# Patient Record
Sex: FEMALE | Race: BLACK OR AFRICAN AMERICAN | NOT HISPANIC OR LATINO | Employment: OTHER | URBAN - METROPOLITAN AREA
[De-identification: names, ages, dates, MRNs, and addresses within clinical notes are randomized per-mention and may not be internally consistent; named-entity substitution may affect disease eponyms.]

---

## 2017-08-06 ENCOUNTER — HOSPITAL ENCOUNTER (EMERGENCY)
Facility: OTHER | Age: 47
Discharge: HOME | End: 2017-08-06
Attending: EMERGENCY MEDICINE
Payer: MEDICAID

## 2017-08-06 VITALS
SYSTOLIC BLOOD PRESSURE: 120 MMHG | RESPIRATION RATE: 17 BRPM | TEMPERATURE: 99 F | BODY MASS INDEX: 32.47 KG/M2 | HEIGHT: 66 IN | DIASTOLIC BLOOD PRESSURE: 67 MMHG | HEART RATE: 95 BPM | WEIGHT: 202 LBS | OXYGEN SATURATION: 100 %

## 2017-08-06 DIAGNOSIS — K05.219 PERIODONTAL ABSCESS: Primary | ICD-10-CM

## 2017-08-06 PROCEDURE — 40800 DRAINAGE OF MOUTH LESION: CPT

## 2017-08-06 PROCEDURE — 99283 EMERGENCY DEPT VISIT LOW MDM: CPT | Mod: 25

## 2017-08-06 PROCEDURE — 25000003 PHARM REV CODE 250: Performed by: EMERGENCY MEDICINE

## 2017-08-06 RX ORDER — PENICILLIN V POTASSIUM 500 MG/1
500 TABLET, FILM COATED ORAL 4 TIMES DAILY
Qty: 28 TABLET | Refills: 0 | Status: SHIPPED | OUTPATIENT
Start: 2017-08-06 | End: 2017-08-13

## 2017-08-06 RX ORDER — PENICILLIN V POTASSIUM 500 MG/1
500 TABLET, FILM COATED ORAL
Status: COMPLETED | OUTPATIENT
Start: 2017-08-06 | End: 2017-08-06

## 2017-08-06 RX ORDER — METFORMIN HYDROCHLORIDE EXTENDED-RELEASE TABLETS 1000 MG/1
1000 TABLET, FILM COATED, EXTENDED RELEASE ORAL 2 TIMES DAILY WITH MEALS
Status: ON HOLD | COMMUNITY
End: 2020-02-27

## 2017-08-06 RX ORDER — HYDROCODONE BITARTRATE AND ACETAMINOPHEN 5; 325 MG/1; MG/1
1 TABLET ORAL EVERY 4 HOURS PRN
Qty: 8 TABLET | Refills: 0 | Status: SHIPPED | OUTPATIENT
Start: 2017-08-06 | End: 2017-08-16

## 2017-08-06 RX ORDER — LIDOCAINE HYDROCHLORIDE 10 MG/ML
10 INJECTION INFILTRATION; PERINEURAL
Status: COMPLETED | OUTPATIENT
Start: 2017-08-06 | End: 2017-08-06

## 2017-08-06 RX ORDER — WARFARIN 1 MG/1
TABLET ORAL DAILY
Status: ON HOLD | COMMUNITY
End: 2020-02-29 | Stop reason: HOSPADM

## 2017-08-06 RX ADMIN — PENICILLIN V POTASIUM 500 MG: 500 TABLET OROPHARYNGEAL at 11:08

## 2017-08-06 RX ADMIN — LIDOCAINE HYDROCHLORIDE 10 ML: 10 INJECTION, SOLUTION INFILTRATION; PERINEURAL at 10:08

## 2017-08-06 NOTE — ED PROVIDER NOTES
"Encounter Date: 8/6/2017    SCRIBE #1 NOTE: I, Jesenia Mosquera, am scribing for, and in the presence of,  Dr. Loza. I have scribed the entire note.       History     Chief Complaint   Patient presents with    Dental Pain     x 2 weeks after tooth broke    Facial Swelling     since this am to the left side of the face and below the left eye.      Time seen by provider: 10:28 AM    This is a 47 y.o. female who presents with complaint of dental pain x 2 weeks. Pt notes her tooth to the L upper mouth "anastasiya just fell apart" about 2 weeks ago. She has had onset of pain since with gradual increase of severity. She has had swelling to the L sided face for 3 days, which has also increased in severity since onset. Pt has gargled salt water and used warm compresses with no relief. She was given a referral to a dentist but has not seen one yet. She has no hx abscesses. Pt has no fever, chills, sweating, N/V/D, rhinorrhea, or weakness.      The history is provided by the patient.     Review of patient's allergies indicates:  No Known Allergies  Past Medical History:   Diagnosis Date    Arthritis     Asthma     CHF (congestive heart failure)     Diabetes mellitus     Hypertension      Past Surgical History:   Procedure Laterality Date    CARDIAC SURGERY       History reviewed. No pertinent family history.  Social History   Substance Use Topics    Smoking status: Current Every Day Smoker    Smokeless tobacco: Never Used    Alcohol use No     Review of Systems   Constitutional: Negative for chills, diaphoresis and fever.   HENT: Positive for dental problem (tooth in L upper mouth) and facial swelling (L sided ). Negative for ear pain, rhinorrhea and sore throat.    Eyes: Negative for pain and visual disturbance.   Respiratory: Negative for cough, shortness of breath and wheezing.    Cardiovascular: Negative for chest pain.   Gastrointestinal: Negative for abdominal pain, diarrhea, nausea and vomiting. "   Genitourinary: Negative for dysuria and urgency.   Musculoskeletal: Negative for back pain and neck pain.   Skin: Negative for rash.   Neurological: Negative for dizziness, weakness, light-headedness, numbness and headaches.   Hematological: Does not bruise/bleed easily.   Psychiatric/Behavioral: Negative for behavioral problems and confusion.       Physical Exam     Initial Vitals [08/06/17 1019]   BP Pulse Resp Temp SpO2   115/72 102 18 97 °F (36.1 °C) 99 %      MAP       86.33         Physical Exam    Nursing note and vitals reviewed.  Constitutional: She appears well-developed and well-nourished. She is not diaphoretic. No distress.   HENT:   Head: Normocephalic and atraumatic.   Right Ear: External ear normal.   Left Ear: External ear normal.   Mouth/Throat: Abnormal dentition.   L sided facial swelling adjacent to tooth number 13 and 14 with an area of fluctuance that is bulging and TTP in the buccal mucosa.   Eyes: Right eye exhibits no discharge. Left eye exhibits no discharge.   Neck: Normal range of motion. Neck supple.   Pulmonary/Chest: No respiratory distress.   Musculoskeletal: Normal range of motion.   Neurological: She is alert and oriented to person, place, and time.   Skin: Skin is warm and dry. No rash noted. No erythema.   Psychiatric: She has a normal mood and affect. Her behavior is normal.         ED Course   I & D - Incision and Drainage  Date/Time: 8/6/2017 11:14 AM  Performed by: CORRIE RAIN.  Authorized by: CORRIE RAIN.   Consent Done: Not Needed  Type: abscess  Body area: mouth (Adjacent to tooth number 13 and 14 in the buccal mucosa.)  Anesthesia: local infiltration    Anesthesia:  Local Anesthetic: lidocaine 1% without epinephrine  Anesthetic total: 0.5 mL  Patient sedated: no  Scalpel size: 11  Incision type: single straight  Complexity: simple  Drainage: pus  Drainage amount: copious  Wound treatment: incision,  drainage and  expression of material  Complications:  No  Specimens: No  Implants: No  Patient tolerance: Patient tolerated the procedure well with no immediate complications        Labs Reviewed - No data to display          Medical Decision Makin-year-old female with facial swelling.  Patient has a periodontal abscess.  I indeed with significant amount of pus.  We'll discharge to follow-up with dentist as an outpatient.  Discharged on penicillin.            Scribe Attestation:   Scribe #1: I performed the above scribed service and the documentation accurately describes the services I performed. I attest to the accuracy of the note.    Attending Attestation:           Physician Attestation for Scribe:  Physician Attestation Statement for Scribe #1: I, Dr. Loza, reviewed documentation, as scribed by Jesenia Mosquera in my presence, and it is both accurate and complete.                 ED Course     Clinical Impression:     1. Periodontal abscess                                 Julian Loza, DO  17 [id number]

## 2017-08-06 NOTE — ED NOTES
Patient Identifiers for Delaney Scott checked and correct  LOC: The patient is awake, alert and aware of environment with an appropriate affect, the patient is oriented x 3 and speaking appropriate.  APPEARANCE: Left sided facial & periorbital swelling.  Patient resting comfortably and in no acute distress. The patient is clean and well groomed. The patient's clothing is properly fastened.  SKIN: The skin is warm and dry. The patient has normal skin turgor and moist mucus membranes. No rashes or lesions upon observation.   Musculoskeletal :  Normal range of motion noted. Moves all extremities well.  RESPIRATORY: Airway is open and patent, respirations are spontaneous, patient has a normal effort and rate. Breath sounds are clear & equal, bilaterally.  PULSES: 2+ radial  pulses, symmetrical.  ENT: Palpation tenderness of the upper & lower back gums. Abscess of the broken left tooth.    Will continue to monitor

## 2017-08-06 NOTE — ED TRIAGE NOTES
"Pt c/o left sided facial swelling & dental pain which started 3 days ago. Pt denies recent dental work, reports that she has a "chipped tooth." Denies fever.  "

## 2018-09-13 ENCOUNTER — HOSPITAL ENCOUNTER (EMERGENCY)
Facility: OTHER | Age: 48
Discharge: HOME | End: 2018-09-13
Attending: EMERGENCY MEDICINE
Payer: MEDICAID

## 2018-09-13 VITALS
RESPIRATION RATE: 18 BRPM | DIASTOLIC BLOOD PRESSURE: 59 MMHG | TEMPERATURE: 99 F | OXYGEN SATURATION: 100 % | SYSTOLIC BLOOD PRESSURE: 113 MMHG | BODY MASS INDEX: 44.41 KG/M2 | HEART RATE: 90 BPM | HEIGHT: 68 IN | WEIGHT: 293 LBS

## 2018-09-13 DIAGNOSIS — J45.901 EXACERBATION OF ASTHMA, UNSPECIFIED ASTHMA SEVERITY, UNSPECIFIED WHETHER PERSISTENT: Primary | ICD-10-CM

## 2018-09-13 DIAGNOSIS — R06.02 SOB (SHORTNESS OF BREATH): ICD-10-CM

## 2018-09-13 LAB
ANION GAP SERPL CALC-SCNC: 11 MMOL/L
BASOPHILS # BLD AUTO: 0.02 K/UL
BASOPHILS NFR BLD: 0.3 %
BNP SERPL-MCNC: 72 PG/ML
BUN SERPL-MCNC: 13 MG/DL
CALCIUM SERPL-MCNC: 9.4 MG/DL
CHLORIDE SERPL-SCNC: 104 MMOL/L
CO2 SERPL-SCNC: 26 MMOL/L
CREAT SERPL-MCNC: 1 MG/DL
DIFFERENTIAL METHOD: ABNORMAL
EOSINOPHIL # BLD AUTO: 0.1 K/UL
EOSINOPHIL NFR BLD: 1.3 %
ERYTHROCYTE [DISTWIDTH] IN BLOOD BY AUTOMATED COUNT: 15.5 %
EST. GFR  (AFRICAN AMERICAN): >60 ML/MIN/1.73 M^2
EST. GFR  (NON AFRICAN AMERICAN): >60 ML/MIN/1.73 M^2
GLUCOSE SERPL-MCNC: 114 MG/DL
HCT VFR BLD AUTO: 35.7 %
HGB BLD-MCNC: 10.6 G/DL
INR PPP: 2
LYMPHOCYTES # BLD AUTO: 0.8 K/UL
LYMPHOCYTES NFR BLD: 10.1 %
MCH RBC QN AUTO: 27 PG
MCHC RBC AUTO-ENTMCNC: 29.7 G/DL
MCV RBC AUTO: 91 FL
MONOCYTES # BLD AUTO: 0.5 K/UL
MONOCYTES NFR BLD: 6 %
NEUTROPHILS # BLD AUTO: 6.2 K/UL
NEUTROPHILS NFR BLD: 82.2 %
PLATELET # BLD AUTO: 269 K/UL
PMV BLD AUTO: 9.6 FL
POTASSIUM SERPL-SCNC: 3.8 MMOL/L
PROTHROMBIN TIME: 21.7 SEC
RBC # BLD AUTO: 3.92 M/UL
SODIUM SERPL-SCNC: 141 MMOL/L
TROPONIN I SERPL DL<=0.01 NG/ML-MCNC: <0.006 NG/ML
WBC # BLD AUTO: 7.49 K/UL

## 2018-09-13 PROCEDURE — 94644 CONT INHLJ TX 1ST HOUR: CPT

## 2018-09-13 PROCEDURE — 99284 EMERGENCY DEPT VISIT MOD MDM: CPT | Mod: 25

## 2018-09-13 PROCEDURE — 36415 COLL VENOUS BLD VENIPUNCTURE: CPT

## 2018-09-13 PROCEDURE — 83880 ASSAY OF NATRIURETIC PEPTIDE: CPT

## 2018-09-13 PROCEDURE — 94640 AIRWAY INHALATION TREATMENT: CPT

## 2018-09-13 PROCEDURE — 25000242 PHARM REV CODE 250 ALT 637 W/ HCPCS: Performed by: EMERGENCY MEDICINE

## 2018-09-13 PROCEDURE — 85025 COMPLETE CBC W/AUTO DIFF WBC: CPT

## 2018-09-13 PROCEDURE — 80048 BASIC METABOLIC PNL TOTAL CA: CPT

## 2018-09-13 PROCEDURE — 84484 ASSAY OF TROPONIN QUANT: CPT

## 2018-09-13 PROCEDURE — 93005 ELECTROCARDIOGRAM TRACING: CPT

## 2018-09-13 PROCEDURE — 85610 PROTHROMBIN TIME: CPT

## 2018-09-13 PROCEDURE — 93010 ELECTROCARDIOGRAM REPORT: CPT | Mod: ,,, | Performed by: INTERNAL MEDICINE

## 2018-09-13 PROCEDURE — 63600175 PHARM REV CODE 636 W HCPCS: Performed by: EMERGENCY MEDICINE

## 2018-09-13 PROCEDURE — 94761 N-INVAS EAR/PLS OXIMETRY MLT: CPT

## 2018-09-13 RX ORDER — ASPIRIN 325 MG
325 TABLET ORAL
Status: DISCONTINUED | OUTPATIENT
Start: 2018-09-13 | End: 2018-09-13 | Stop reason: HOSPADM

## 2018-09-13 RX ORDER — IPRATROPIUM BROMIDE AND ALBUTEROL SULFATE 2.5; .5 MG/3ML; MG/3ML
3 SOLUTION RESPIRATORY (INHALATION)
Status: COMPLETED | OUTPATIENT
Start: 2018-09-13 | End: 2018-09-13

## 2018-09-13 RX ORDER — ALBUTEROL SULFATE 0.83 MG/ML
20 SOLUTION RESPIRATORY (INHALATION)
Status: COMPLETED | OUTPATIENT
Start: 2018-09-13 | End: 2018-09-13

## 2018-09-13 RX ORDER — HYDROCHLOROTHIAZIDE 25 MG/1
25 TABLET ORAL DAILY
COMMUNITY
End: 2020-01-18

## 2018-09-13 RX ORDER — PREDNISONE 20 MG/1
60 TABLET ORAL DAILY
Qty: 12 TABLET | Refills: 0 | Status: SHIPPED | OUTPATIENT
Start: 2018-09-13 | End: 2018-09-17

## 2018-09-13 RX ORDER — PREDNISONE 20 MG/1
60 TABLET ORAL
Status: COMPLETED | OUTPATIENT
Start: 2018-09-13 | End: 2018-09-13

## 2018-09-13 RX ADMIN — ALBUTEROL SULFATE 20 MG: 2.5 SOLUTION RESPIRATORY (INHALATION) at 06:09

## 2018-09-13 RX ADMIN — PREDNISONE 60 MG: 20 TABLET ORAL at 06:09

## 2018-09-13 RX ADMIN — IPRATROPIUM BROMIDE AND ALBUTEROL SULFATE 3 ML: .5; 3 SOLUTION RESPIRATORY (INHALATION) at 07:09

## 2018-09-13 NOTE — ED TRIAGE NOTES
"Pt arrived to ED with c/o SOB that started last night. Pt states "I was trying to fall asleep and I was having a hard time breathing." pt also c/o mid sternal chest pain described as "tight", cough, vomit x1 but denies fever, chills, abdominal pain.   "

## 2018-09-13 NOTE — ED NOTES
Continuous breathing treatment finished at this time. Pt sitting up talking to telephone. Pt remains on room air, respirations remain spontaneous, even and non labored w/ no distress noted. Pt remains on cardiac monitor, continuous pulse oximetry and automatic blood pressure cuff cycling w/ alarms set. Bed placed in low locked position, side rails up x 2, call light is within reach of patient or family, alarms set and turned on for monitor and pulse ox, will continue to monitor.

## 2018-09-13 NOTE — ED NOTES
MD domingoayed pt for discharge at this time. MD Loza speaking with pt about prescriptions, follow up care and education for smoking cessation at this time.

## 2018-09-13 NOTE — ED NOTES
Patient sitting up in stretcher speaking in clear full sentences. Patient remains continuous breathing treatments tolerating well. Pt remains on cardiac monitor, continuous pulse oximetry and automatic blood pressure cuff cycling w/ alarms set. Respirations remain spontaneous, even and non labored currently. Bed placed in low locked position, side rails up x 2, call light is within reach of patient or family, alarms set and turned on for monitor and pulse ox, will continue to monitor.     Two patient identifiers have been checked and are correct.    Appearance: Pt awake, alert & oriented to person, place & time. Pt in no acute distress at present time. Pt is clean and well groomed with clothes appropriately fastened.   Skin: Skin warm, dry & intact. Color consistent with ethnicity. Mucous membranes moist. No breakdown or brusing noted.   Musculoskeletal: Patient moving all extremities well, no obvious swelling or deformities noted.   Respiratory: Respirations spontaneous, even, and non-labored. Visible chest rise noted. Airway is open and patent. No accessory muscle use noted. Breaths sounds course bilaterally in lower lobes upon ascultation. Breathing treatment continues w/ pt tolerating well.   Neurologic: Sensation is intact. Speech is clear and appropriate. Eyes open spontaneously, behavior appropriate to situation, follows commands, facial expression symmetrical, bilateral hand grasp equal and even, purposeful motor response noted.  Cardiac: All peripheral pulses present. No Bilateral lower extremity edema. Cap refill is <3 seconds. Pt denies active chest pains, SOB, dizziness, blurred vision, weakness or fatigue currently.   Abdomen: Abdomen soft, non-tender to palpation. Pt denies active abdominal pains or cramping at this time.

## 2018-09-13 NOTE — ED PROVIDER NOTES
"Encounter Date: 9/13/2018    SCRIBE #1 NOTE: I, Kayla Rosas, am scribing for, and in the presence of, Dr. Loza.       History     Chief Complaint   Patient presents with    Shortness of Breath     pt complaining of SOB onset last night.  wheezing noted has hx of asthma.  also having cp starting approx 1-2 am     Time seen by provider: 6:07 AM    This is a 48 y.o. female, with history of HTN, DM, asthma, and CHF, who presents with complaint of mid sternal chest pain with inspiration that began approximately 5 hours ago. She has experienced similar pain intermittently for the past few months. She has followed up for pain with cardiology at [facility]. She reports SOB, N/V, "aching" upper abdominal pain when vomiting, and difficulty sleeping due to choking when laying down. She states the chest pain feels worse than SOB. She reports symptoms feel similar to past asthma exacerbation and that they do not feel similar to CHF. She denies improvement with Symbicort last night and has not used it this morning. She denies fever, chills, diaphoresis, palpitations, or weakness. She reports PSHx of valve replacement.      The history is provided by the patient.     Review of patient's allergies indicates:  No Known Allergies  Past Medical History:   Diagnosis Date    Arthritis     Asthma     CHF (congestive heart failure)     Diabetes mellitus     Hypertension      Past Surgical History:   Procedure Laterality Date    CARDIAC SURGERY       History reviewed. No pertinent family history.  Social History     Tobacco Use    Smoking status: Current Every Day Smoker    Smokeless tobacco: Never Used   Substance Use Topics    Alcohol use: No    Drug use: No     Review of Systems   Constitutional: Negative for chills, diaphoresis and fever.   HENT: Negative for congestion, rhinorrhea and sore throat.    Eyes: Negative for visual disturbance.   Respiratory: Positive for choking and shortness of breath. Negative for cough.  "   Cardiovascular: Positive for chest pain. Negative for palpitations and leg swelling.   Gastrointestinal: Positive for abdominal pain, nausea and vomiting. Negative for constipation and diarrhea.   Genitourinary: Negative for dysuria.   Musculoskeletal: Negative for back pain.   Skin: Negative for rash.   Neurological: Negative for dizziness, weakness, light-headedness, numbness and headaches.   Hematological: Does not bruise/bleed easily.   Psychiatric/Behavioral: Positive for sleep disturbance.       Physical Exam     Initial Vitals [09/13/18 0611]   BP Pulse Resp Temp SpO2   119/66 96 (!) 22 99.5 °F (37.5 °C) (!) 92 %      MAP       --         Physical Exam    Nursing note and vitals reviewed.  Constitutional: She appears well-developed and well-nourished. She is not diaphoretic. No distress.   HENT:   Head: Normocephalic and atraumatic.   Eyes: Conjunctivae and EOM are normal. No scleral icterus.   Neck: Normal range of motion. Neck supple.   Cardiovascular: Normal rate, regular rhythm and normal heart sounds. Exam reveals no gallop and no friction rub.    No murmur heard.  Pulmonary/Chest: Accessory muscle usage present. Tachypnea noted. No respiratory distress. She has wheezes (expiratory in all lung fields). She has no rhonchi. She has no rales. She exhibits tenderness (reproducible TTP).   Speaking full sentences. Thoracotomy scar present.   Abdominal: Soft. There is no tenderness.   Musculoskeletal: Normal range of motion. She exhibits no edema or tenderness.   No leg swelling.   Neurological: She is alert and oriented to person, place, and time.   Skin: Skin is warm and dry. No rash noted. No erythema.         ED Course   Procedures  Labs Reviewed   CBC W/ AUTO DIFFERENTIAL - Abnormal; Notable for the following components:       Result Value    RBC 3.92 (*)     Hemoglobin 10.6 (*)     Hematocrit 35.7 (*)     MCHC 29.7 (*)     RDW 15.5 (*)     Lymph # 0.8 (*)     Gran% 82.2 (*)     Lymph% 10.1 (*)     All  other components within normal limits   BASIC METABOLIC PANEL - Abnormal; Notable for the following components:    Glucose 114 (*)     All other components within normal limits   PROTIME-INR - Abnormal; Notable for the following components:    Prothrombin Time 21.7 (*)     INR 2.0 (*)     All other components within normal limits   TROPONIN I   B-TYPE NATRIURETIC PEPTIDE   PROTIME-INR     EKG Readings: (Independently Interpreted)   Normal sinus rhythm at rate of 92 bpm. Normal axis. Normal intervals. No ST or ischemic changes. No change from EKG on 2/27/16.       Imaging Results          X-Ray Chest AP Portable (Final result)  Result time 09/13/18 07:33:33    Final result by All López MD (09/13/18 07:33:33)                 Impression:      No pulmonary consolidation.  Mild prominence of the central pulmonary vasculature which may relate to pulmonary hypertension.  Prosthetic mitral valve.      Electronically signed by: All López MD  Date:    09/13/2018  Time:    07:33             Narrative:    EXAMINATION:  XR CHEST AP PORTABLE    CLINICAL HISTORY:  Shortness of breath    TECHNIQUE:  Single frontal view of the chest was performed.    COMPARISON:  Radiograph 02/27/2016.    FINDINGS:  Mediastinal structures are midline.  There is slight prominence of the central pulmonary vasculature, possibly reflecting pulmonary hypertension.  Heart is normal in size.  Prosthetic mitral valve identified.  Lung volumes are symmetric.  No focal consolidation.  No pneumothorax or pleural effusions.  No free air beneath the diaphragm.  Sternotomy wires remain aligned.  Upper 2 wires are fractured, new when compared to the previous exam.                              X-Rays:   Independently Interpreted Readings:   Chest X-Ray: Normal heart size. No infiltrate. No pneumothorax. No bony deformity. No acute disease.     Medical Decision Making:   Clinical Tests:   Lab Tests: Ordered and Reviewed  Radiological Study: Ordered  and Reviewed  Medical Tests: Ordered and Reviewed  ED Management:  48-year-old female presents with shortness of breath. She also complains of chest pain but states this been going on for months.  It is reproducible.  I do not suspect this to be ACS.  Likely musculoskeletal.  She does have expiratory wheezing.  I suspect asthma exacerbation.  Will give breathing treatments.  Will also get labs as well as EKG and chest x-ray.  History is inconsistent with pneumonia or pneumothorax.  I have a low suspicion for pulmonary embolus.        7:05 AM- On reevaluation, patient feeling much better. Improved air flow. Still some expiratory wheezing. Labs and CXR are negative for acute abnormalities.  Coumadin level is 2.0.    7:50 a.m. patient still receiving breathing treatments.  Feels much better.    8:25 a.m. patient has finished breathing treatments.  Feeling much better.  Was talking on the phone with no respiratory distress.  Ambulated in the emergency department and states she feels normal.  Oxygen saturations remained 100%.  Will discharge.    Patient discharged home in stable condition. Diagnosis and treatment plan explained to patient. I have answered all questions and the patient is satisfied with the plan of care. The patient demonstrates understanding of the care plan. This is the extent to the patients complaints today here in the emergency department.            Scribe Attestation:   Scribe #1: I performed the above scribed service and the documentation accurately describes the services I performed. I attest to the accuracy of the note.    Attending Attestation:           Physician Attestation for Scribe:  Physician Attestation Statement for Scribe #1: I, Dr. Loza, reviewed documentation, as scribed by Kayla Rosas in my presence, and it is both accurate and complete.                    Clinical Impression:     1. Exacerbation of asthma, unspecified asthma severity, unspecified whether persistent    2.  SOB (shortness of breath)                                 Julian Loza, DO  09/13/18 0849

## 2018-09-13 NOTE — DISCHARGE INSTRUCTIONS
Usual albuterol inhaler every 4 hr for the next 2 days.  May use in between as needed.  Use the steroids.  Follow up with primary care.  You should stop smoking cigarettes.

## 2018-09-13 NOTE — ED NOTES
"Pt ambulated unassisted w/ steady gait down ED hallway. Respirations remain spontaneous, even and non labored w/ no distress noted on room air. Pt current pulse ox is 100% on room air. Pt states," I feel so much better. I am ready to go home now. Thoses breathing treatments really helped me a lot".   "

## 2019-09-22 ENCOUNTER — HOSPITAL ENCOUNTER (EMERGENCY)
Facility: OTHER | Age: 49
Discharge: HOME | End: 2019-09-22
Attending: EMERGENCY MEDICINE
Payer: MEDICAID

## 2019-09-22 VITALS
SYSTOLIC BLOOD PRESSURE: 120 MMHG | DIASTOLIC BLOOD PRESSURE: 56 MMHG | HEART RATE: 78 BPM | TEMPERATURE: 98 F | BODY MASS INDEX: 44.25 KG/M2 | OXYGEN SATURATION: 95 % | WEIGHT: 292 LBS | HEIGHT: 68 IN | RESPIRATION RATE: 18 BRPM

## 2019-09-22 DIAGNOSIS — K08.89 ODONTALGIA: ICD-10-CM

## 2019-09-22 DIAGNOSIS — L03.211 FACIAL CELLULITIS: Primary | ICD-10-CM

## 2019-09-22 LAB
ALBUMIN SERPL BCP-MCNC: 3.1 G/DL (ref 3.5–5.2)
ALP SERPL-CCNC: 88 U/L (ref 55–135)
ALT SERPL W/O P-5'-P-CCNC: 21 U/L (ref 10–44)
ANION GAP SERPL CALC-SCNC: 5 MMOL/L (ref 8–16)
AST SERPL-CCNC: 17 U/L (ref 10–40)
BASOPHILS # BLD AUTO: ABNORMAL K/UL (ref 0–0.2)
BASOPHILS NFR BLD: 0 % (ref 0–1.9)
BILIRUB SERPL-MCNC: 0.3 MG/DL (ref 0.1–1)
BUN SERPL-MCNC: 7 MG/DL (ref 6–20)
CALCIUM SERPL-MCNC: 8.9 MG/DL (ref 8.7–10.5)
CHLORIDE SERPL-SCNC: 106 MMOL/L (ref 95–110)
CO2 SERPL-SCNC: 29 MMOL/L (ref 23–29)
CREAT SERPL-MCNC: 0.8 MG/DL (ref 0.5–1.4)
DIFFERENTIAL METHOD: ABNORMAL
EOSINOPHIL # BLD AUTO: ABNORMAL K/UL (ref 0–0.5)
EOSINOPHIL NFR BLD: 5 % (ref 0–8)
ERYTHROCYTE [DISTWIDTH] IN BLOOD BY AUTOMATED COUNT: 16.6 % (ref 11.5–14.5)
EST. GFR  (AFRICAN AMERICAN): >60 ML/MIN/1.73 M^2
EST. GFR  (NON AFRICAN AMERICAN): >60 ML/MIN/1.73 M^2
GLUCOSE SERPL-MCNC: 92 MG/DL (ref 70–110)
HCT VFR BLD AUTO: 28.4 % (ref 37–48.5)
HGB BLD-MCNC: 8.2 G/DL (ref 12–16)
IMM GRANULOCYTES # BLD AUTO: ABNORMAL K/UL (ref 0–0.04)
IMM GRANULOCYTES NFR BLD AUTO: ABNORMAL % (ref 0–0.5)
LYMPHOCYTES # BLD AUTO: ABNORMAL K/UL (ref 1–4.8)
LYMPHOCYTES NFR BLD: 20 % (ref 18–48)
MCH RBC QN AUTO: 25 PG (ref 27–31)
MCHC RBC AUTO-ENTMCNC: 28.9 G/DL (ref 32–36)
MCV RBC AUTO: 87 FL (ref 82–98)
MONOCYTES # BLD AUTO: ABNORMAL K/UL (ref 0.3–1)
MONOCYTES NFR BLD: 5 % (ref 4–15)
NEUTROPHILS NFR BLD: 70 % (ref 38–73)
NRBC BLD-RTO: 0 /100 WBC
PLATELET # BLD AUTO: 246 K/UL (ref 150–350)
PLATELET BLD QL SMEAR: ABNORMAL
PMV BLD AUTO: 9.9 FL (ref 9.2–12.9)
POTASSIUM SERPL-SCNC: 4.3 MMOL/L (ref 3.5–5.1)
PROT SERPL-MCNC: 6.7 G/DL (ref 6–8.4)
RBC # BLD AUTO: 3.28 M/UL (ref 4–5.4)
SODIUM SERPL-SCNC: 140 MMOL/L (ref 136–145)
WBC # BLD AUTO: 5.23 K/UL (ref 3.9–12.7)

## 2019-09-22 PROCEDURE — S0077 INJECTION, CLINDAMYCIN PHOSP: HCPCS | Performed by: NURSE PRACTITIONER

## 2019-09-22 PROCEDURE — 85027 COMPLETE CBC AUTOMATED: CPT

## 2019-09-22 PROCEDURE — 85007 BL SMEAR W/DIFF WBC COUNT: CPT

## 2019-09-22 PROCEDURE — 25000003 PHARM REV CODE 250: Performed by: NURSE PRACTITIONER

## 2019-09-22 PROCEDURE — 25500020 PHARM REV CODE 255: Performed by: EMERGENCY MEDICINE

## 2019-09-22 PROCEDURE — 96365 THER/PROPH/DIAG IV INF INIT: CPT | Mod: 59

## 2019-09-22 PROCEDURE — 96375 TX/PRO/DX INJ NEW DRUG ADDON: CPT

## 2019-09-22 PROCEDURE — 63600175 PHARM REV CODE 636 W HCPCS: Performed by: NURSE PRACTITIONER

## 2019-09-22 PROCEDURE — 36415 COLL VENOUS BLD VENIPUNCTURE: CPT

## 2019-09-22 PROCEDURE — 80053 COMPREHEN METABOLIC PANEL: CPT

## 2019-09-22 PROCEDURE — 99285 EMERGENCY DEPT VISIT HI MDM: CPT | Mod: 25

## 2019-09-22 RX ORDER — CLINDAMYCIN HYDROCHLORIDE 150 MG/1
300 CAPSULE ORAL 4 TIMES DAILY
Qty: 56 CAPSULE | Refills: 0 | Status: SHIPPED | OUTPATIENT
Start: 2019-09-22 | End: 2019-09-29

## 2019-09-22 RX ORDER — HYDROCODONE BITARTRATE AND ACETAMINOPHEN 5; 325 MG/1; MG/1
1 TABLET ORAL
Status: COMPLETED | OUTPATIENT
Start: 2019-09-22 | End: 2019-09-22

## 2019-09-22 RX ORDER — HYDROCODONE BITARTRATE AND ACETAMINOPHEN 5; 325 MG/1; MG/1
1 TABLET ORAL EVERY 6 HOURS PRN
Qty: 12 TABLET | Refills: 0 | Status: ON HOLD | OUTPATIENT
Start: 2019-09-22 | End: 2020-02-27

## 2019-09-22 RX ORDER — DEXAMETHASONE SODIUM PHOSPHATE 4 MG/ML
8 INJECTION, SOLUTION INTRA-ARTICULAR; INTRALESIONAL; INTRAMUSCULAR; INTRAVENOUS; SOFT TISSUE
Status: COMPLETED | OUTPATIENT
Start: 2019-09-22 | End: 2019-09-22

## 2019-09-22 RX ORDER — CLINDAMYCIN PHOSPHATE 900 MG/50ML
900 INJECTION, SOLUTION INTRAVENOUS
Status: COMPLETED | OUTPATIENT
Start: 2019-09-22 | End: 2019-09-22

## 2019-09-22 RX ADMIN — CLINDAMYCIN IN 5 PERCENT DEXTROSE 900 MG: 18 INJECTION, SOLUTION INTRAVENOUS at 10:09

## 2019-09-22 RX ADMIN — IOHEXOL 75 ML: 350 INJECTION, SOLUTION INTRAVENOUS at 11:09

## 2019-09-22 RX ADMIN — HYDROCODONE BITARTRATE AND ACETAMINOPHEN 1 TABLET: 5; 325 TABLET ORAL at 10:09

## 2019-09-22 RX ADMIN — DEXAMETHASONE SODIUM PHOSPHATE 8 MG: 4 INJECTION, SOLUTION INTRAMUSCULAR; INTRAVENOUS at 10:09

## 2019-09-22 NOTE — DISCHARGE INSTRUCTIONS
Thank you for allowing me to care for you today.  I hope our treatment plan will make you feel better in the next few days.  In order for me to take better care of my future patients and improve our Emergency Department, I would appreciate if you can provide us with feedback.  In the next few days, you may receive a survey in the mail.  If you do, it would mean a great deal to me if you would please take the time to complete it.    Thank you and I hope you feel better.  Lynn Molina NP

## 2019-09-22 NOTE — ED TRIAGE NOTES
Pt reports dental pain and being on antibiotics. Pt reports since starting antibiotics her face has become more swollen. Pt reports tearing of eyes. Denies any fever or SOB.

## 2019-09-22 NOTE — ED PROVIDER NOTES
Encounter Date: 9/22/2019       History     Chief Complaint   Patient presents with    Facial Swelling     Pt c/o dental pain, given ABX per dentist on Friday. Pt c/o increased facial swelling since starting ABX Friday.     The patient is a 49-year-old female with a past medical history of arthritis, asthma, CHF, diabetes, and hypertension who presents to the ED complaining of dental pain and worsening facial swelling that began approximately 5 days ago.  Patient was seen in the ED at [facility] 2 days ago and was prescribed amoxicillin.  She reports compliance with this medication, but states that her facial swelling and facial pain have progressed.  Denies fever and chills. States that she feels like she is having trouble breathing through her nose due to swelling. Denies inability to open her jaw as usual.  Patient states that her blood glucose is usually well-controlled.  No other treatment attempted prior to arrival.    The history is provided by the patient.     Review of patient's allergies indicates:  No Known Allergies  Past Medical History:   Diagnosis Date    Arthritis     Asthma     CHF (congestive heart failure)     Diabetes mellitus     Hypertension      Past Surgical History:   Procedure Laterality Date    CARDIAC SURGERY       No family history on file.  Social History     Tobacco Use    Smoking status: Current Every Day Smoker    Smokeless tobacco: Never Used   Substance Use Topics    Alcohol use: No    Drug use: No     Review of Systems   Constitutional: Negative for chills and fever.   HENT: Positive for dental problem and facial swelling. Negative for sore throat.    Respiratory: Negative for shortness of breath.    Cardiovascular: Negative for chest pain.   Gastrointestinal: Negative for nausea.   Genitourinary: Negative for dysuria.   Musculoskeletal: Negative for back pain.   Skin: Negative for rash.   Neurological: Negative for dizziness and weakness.   Hematological: Does not  bruise/bleed easily.       Physical Exam     Initial Vitals [09/22/19 0915]   BP Pulse Resp Temp SpO2   (!) 140/94 85 18 98.4 °F (36.9 °C) 99 %      MAP       --         Physical Exam    Nursing note and vitals reviewed.  Constitutional: She appears well-developed and well-nourished.  Non-toxic appearance. No distress.   The patient is alert, oriented, and speaking in complete sentences.  No apparent distress. She appears uncomfortable.  Tolerating secretions without difficulty.   HENT:   Head: Normocephalic and atraumatic.       Right Ear: Hearing, tympanic membrane, external ear and ear canal normal.   Left Ear: Hearing, tympanic membrane, external ear and ear canal normal.   Nose: Nose normal.   Mouth/Throat: Uvula is midline, oropharynx is clear and moist and mucous membranes are normal.       Significant right-sided facial swelling with tenderness to palpation.  There is no trismus on exam.  Jawline is palpable.  There is no oropharyngeal edema or erythema.  No sublingual edema. Central incisor is fractured and there is mild swelling and erythema to the surrounding gingiva.  See photo.   Eyes: Conjunctivae and EOM are normal.   Neck: Full passive range of motion without pain. Neck supple.   Cardiovascular: Normal rate, regular rhythm, normal heart sounds and normal pulses. Exam reveals no gallop and no friction rub.    No murmur heard.  Pulmonary/Chest: Effort normal. No respiratory distress. She has wheezes (Bilateral). She has no rhonchi. She has no rales.   Musculoskeletal: Normal range of motion.   Neurological: She is alert and oriented to person, place, and time. She has normal strength. Gait normal.   Skin: Skin is warm, dry and intact. Capillary refill takes less than 2 seconds. No rash noted.   Psychiatric: She has a normal mood and affect. Her speech is normal and behavior is normal. Judgment and thought content normal. Cognition and memory are normal.             ED Course   Procedures  Labs Reviewed    CBC W/ AUTO DIFFERENTIAL   COMPREHENSIVE METABOLIC PANEL          Imaging Results    None          Medical Decision Making:   History:   Old Medical Records: I decided to obtain old medical records.  Clinical Tests:   Lab Tests: Ordered and Reviewed  Radiological Study: Ordered and Reviewed  ED Management:  Patient reports improvement of symptoms following Decadron and Norco administration.    One IV dose of clindamycin was also administered while here in the ED.    I independently reviewed and interpreted labs which are unrevealing.  There is no leukocytosis.  There is a drop noted in her H&H from 10.6/35 to 8.2/28.  Patient denies any known bleeding and dark stools.  She has a history of chronic anemia and states that she is supposed to take iron, but she does not do this due to gastrointestinal upset.  All other labs are essentially within normal limits.    CT max face shows cellulitis/edema of the right face without abscess.    Based on history and physical exam, as well as diagnostic results, I considered but do not suspect abscess, Jose Alberto's angina, or systemic infection.  No evidence of airway compromise.  Clinical presentation suggests facial cellulitis due to dental infection.  There is no intraoral or gingival fluctuance to suggest abscess requiring incision/drainage at this time.  Patient will be discharged with a course of clindamycin and a short course of pain medication.  Patient was instructed to follow-up with a dentist as soon as possible for further evaluation and tooth extraction if needed.  I have also advised that she have close follow-up with her primary care provider.  Strict return precautions have been discussed.  Case discussed with supervising physician who agrees with plan.                      Clinical Impression:       ICD-10-CM ICD-9-CM   1. Facial cellulitis L03.211 682.0   2. Odontalgia K08.89 525.9                                Lynn Molina NP  09/22/19 1154

## 2019-12-26 ENCOUNTER — HOSPITAL ENCOUNTER (EMERGENCY)
Facility: OTHER | Age: 49
Discharge: HOME | End: 2019-12-26
Attending: EMERGENCY MEDICINE
Payer: MEDICAID

## 2019-12-26 VITALS
TEMPERATURE: 98 F | BODY MASS INDEX: 43.5 KG/M2 | DIASTOLIC BLOOD PRESSURE: 59 MMHG | HEIGHT: 68 IN | RESPIRATION RATE: 20 BRPM | HEART RATE: 88 BPM | SYSTOLIC BLOOD PRESSURE: 119 MMHG | OXYGEN SATURATION: 97 % | WEIGHT: 287 LBS

## 2019-12-26 DIAGNOSIS — R07.9 CHEST PAIN: ICD-10-CM

## 2019-12-26 DIAGNOSIS — J45.901 EXACERBATION OF ASTHMA, UNSPECIFIED ASTHMA SEVERITY, UNSPECIFIED WHETHER PERSISTENT: Primary | ICD-10-CM

## 2019-12-26 LAB
ALBUMIN SERPL BCP-MCNC: 3.2 G/DL (ref 3.5–5.2)
ALP SERPL-CCNC: 103 U/L (ref 55–135)
ALT SERPL W/O P-5'-P-CCNC: 14 U/L (ref 10–44)
ANION GAP SERPL CALC-SCNC: 8 MMOL/L (ref 8–16)
AST SERPL-CCNC: 18 U/L (ref 10–40)
BASOPHILS # BLD AUTO: 0.04 K/UL (ref 0–0.2)
BASOPHILS NFR BLD: 0.6 % (ref 0–1.9)
BILIRUB SERPL-MCNC: 0.2 MG/DL (ref 0.1–1)
BUN SERPL-MCNC: 12 MG/DL (ref 6–20)
CALCIUM SERPL-MCNC: 9.5 MG/DL (ref 8.7–10.5)
CHLORIDE SERPL-SCNC: 107 MMOL/L (ref 95–110)
CO2 SERPL-SCNC: 25 MMOL/L (ref 23–29)
CREAT SERPL-MCNC: 0.9 MG/DL (ref 0.5–1.4)
DIFFERENTIAL METHOD: ABNORMAL
EOSINOPHIL # BLD AUTO: 0.2 K/UL (ref 0–0.5)
EOSINOPHIL NFR BLD: 3.5 % (ref 0–8)
ERYTHROCYTE [DISTWIDTH] IN BLOOD BY AUTOMATED COUNT: 16.4 % (ref 11.5–14.5)
EST. GFR  (AFRICAN AMERICAN): >60 ML/MIN/1.73 M^2
EST. GFR  (NON AFRICAN AMERICAN): >60 ML/MIN/1.73 M^2
GLUCOSE SERPL-MCNC: 98 MG/DL (ref 70–110)
HCT VFR BLD AUTO: 33.4 % (ref 37–48.5)
HGB BLD-MCNC: 9.3 G/DL (ref 12–16)
IMM GRANULOCYTES # BLD AUTO: 0.02 K/UL (ref 0–0.04)
IMM GRANULOCYTES NFR BLD AUTO: 0.3 % (ref 0–0.5)
LYMPHOCYTES # BLD AUTO: 1.9 K/UL (ref 1–4.8)
LYMPHOCYTES NFR BLD: 27.1 % (ref 18–48)
MCH RBC QN AUTO: 23.8 PG (ref 27–31)
MCHC RBC AUTO-ENTMCNC: 27.8 G/DL (ref 32–36)
MCV RBC AUTO: 86 FL (ref 82–98)
MONOCYTES # BLD AUTO: 0.4 K/UL (ref 0.3–1)
MONOCYTES NFR BLD: 6.3 % (ref 4–15)
NEUTROPHILS # BLD AUTO: 4.3 K/UL (ref 1.8–7.7)
NEUTROPHILS NFR BLD: 62.2 % (ref 38–73)
NRBC BLD-RTO: 0 /100 WBC
PLATELET # BLD AUTO: 271 K/UL (ref 150–350)
PMV BLD AUTO: 9.9 FL (ref 9.2–12.9)
POTASSIUM SERPL-SCNC: 4.1 MMOL/L (ref 3.5–5.1)
PROT SERPL-MCNC: 7.6 G/DL (ref 6–8.4)
RBC # BLD AUTO: 3.9 M/UL (ref 4–5.4)
SODIUM SERPL-SCNC: 140 MMOL/L (ref 136–145)
TROPONIN I SERPL DL<=0.01 NG/ML-MCNC: 0.01 NG/ML (ref 0–0.03)
WBC # BLD AUTO: 6.95 K/UL (ref 3.9–12.7)

## 2019-12-26 PROCEDURE — 25000242 PHARM REV CODE 250 ALT 637 W/ HCPCS: Performed by: EMERGENCY MEDICINE

## 2019-12-26 PROCEDURE — 94761 N-INVAS EAR/PLS OXIMETRY MLT: CPT

## 2019-12-26 PROCEDURE — 93010 EKG 12-LEAD: ICD-10-PCS | Mod: ,,, | Performed by: INTERNAL MEDICINE

## 2019-12-26 PROCEDURE — 84484 ASSAY OF TROPONIN QUANT: CPT

## 2019-12-26 PROCEDURE — 99285 EMERGENCY DEPT VISIT HI MDM: CPT | Mod: 25

## 2019-12-26 PROCEDURE — 94640 AIRWAY INHALATION TREATMENT: CPT

## 2019-12-26 PROCEDURE — 63600175 PHARM REV CODE 636 W HCPCS: Performed by: EMERGENCY MEDICINE

## 2019-12-26 PROCEDURE — 80053 COMPREHEN METABOLIC PANEL: CPT

## 2019-12-26 PROCEDURE — 93005 ELECTROCARDIOGRAM TRACING: CPT

## 2019-12-26 PROCEDURE — 93010 ELECTROCARDIOGRAM REPORT: CPT | Mod: ,,, | Performed by: INTERNAL MEDICINE

## 2019-12-26 PROCEDURE — 85025 COMPLETE CBC W/AUTO DIFF WBC: CPT

## 2019-12-26 PROCEDURE — 96374 THER/PROPH/DIAG INJ IV PUSH: CPT

## 2019-12-26 PROCEDURE — 94644 CONT INHLJ TX 1ST HOUR: CPT

## 2019-12-26 RX ORDER — METHYLPREDNISOLONE SOD SUCC 125 MG
125 VIAL (EA) INJECTION
Status: COMPLETED | OUTPATIENT
Start: 2019-12-26 | End: 2019-12-26

## 2019-12-26 RX ORDER — IPRATROPIUM BROMIDE 0.5 MG/2.5ML
1 SOLUTION RESPIRATORY (INHALATION)
Status: COMPLETED | OUTPATIENT
Start: 2019-12-26 | End: 2019-12-26

## 2019-12-26 RX ORDER — ALBUTEROL SULFATE 0.83 MG/ML
15 SOLUTION RESPIRATORY (INHALATION)
Status: COMPLETED | OUTPATIENT
Start: 2019-12-26 | End: 2019-12-26

## 2019-12-26 RX ORDER — PREDNISONE 50 MG/1
50 TABLET ORAL DAILY
Qty: 5 TABLET | Refills: 0 | OUTPATIENT
Start: 2019-12-26 | End: 2020-01-18

## 2019-12-26 RX ORDER — ALBUTEROL SULFATE 90 UG/1
2 AEROSOL, METERED RESPIRATORY (INHALATION) EVERY 4 HOURS PRN
Qty: 18 G | Refills: 1 | OUTPATIENT
Start: 2019-12-26 | End: 2020-01-18

## 2019-12-26 RX ORDER — ALBUTEROL SULFATE 1.25 MG/3ML
1.25 SOLUTION RESPIRATORY (INHALATION) EVERY 6 HOURS PRN
Qty: 1 BOX | Refills: 0 | OUTPATIENT
Start: 2019-12-26 | End: 2020-01-18

## 2019-12-26 RX ADMIN — ALBUTEROL SULFATE 15 MG: 2.5 SOLUTION RESPIRATORY (INHALATION) at 08:12

## 2019-12-26 RX ADMIN — IPRATROPIUM BROMIDE 1 MG: 0.5 SOLUTION RESPIRATORY (INHALATION) at 08:12

## 2019-12-26 RX ADMIN — METHYLPREDNISOLONE SODIUM SUCCINATE 125 MG: 125 INJECTION, POWDER, FOR SOLUTION INTRAMUSCULAR; INTRAVENOUS at 07:12

## 2019-12-26 NOTE — ED PROVIDER NOTES
Encounter Date: 12/26/2019    SCRIBE #1 NOTE: I, Mattynereida Valverde, am scribing for, and in the presence of, Dr. Jones.       History     Chief Complaint   Patient presents with    Shortness of Breath     Reports SOB with cough since yesterday. Hx of asthma, has been out of inhaler. Also reports chest tightness.     Time seen by provider: 7:34 AM    This is a 49 y.o. female with a PMHx of CHF, DM, HTN, and asthma who presents with complaint of SOB with associated chest tightness since yesterday. The patient also reports associated cough and sneezing. The patient denies fever, nausea, or vomiting. The patient states she had to be put on steroids for her asthma less than a month ago. She admits to tobacco use.    The history is provided by the patient.     Review of patient's allergies indicates:  No Known Allergies  Past Medical History:   Diagnosis Date    Arthritis     Asthma     CHF (congestive heart failure)     Diabetes mellitus     Hypertension      Past Surgical History:   Procedure Laterality Date    CARDIAC SURGERY      TUBAL LIGATION       No family history on file.  Social History     Tobacco Use    Smoking status: Current Every Day Smoker    Smokeless tobacco: Never Used   Substance Use Topics    Alcohol use: No    Drug use: No     Review of Systems   Constitutional: Negative for fever.   HENT: Positive for sneezing.    Respiratory: Positive for cough, chest tightness and shortness of breath.    Cardiovascular: Negative for chest pain.   Gastrointestinal: Negative for nausea and vomiting.   Genitourinary: Negative for dysuria.   Musculoskeletal: Negative for back pain.   Skin: Negative for rash.   Neurological: Negative for dizziness, weakness and headaches.   Hematological: Does not bruise/bleed easily.   All other systems reviewed and are negative.      Physical Exam     Initial Vitals [12/26/19 0726]   BP Pulse Resp Temp SpO2   (!) 194/82 102 (!) 26 98.8 °F (37.1 °C) (!) 93 %      MAP       --          Physical Exam    Nursing note and vitals reviewed.  Constitutional: She appears well-developed and well-nourished. She is not diaphoretic. No distress.   HENT:   Head: Normocephalic and atraumatic.   Eyes: Conjunctivae and EOM are normal. No scleral icterus.   Neck: Normal range of motion. Neck supple.   Cardiovascular: Regular rhythm and normal heart sounds.   Tachycardic   Pulmonary/Chest: No respiratory distress. She has no rhonchi. She has no rales.   Increased work of breathing. Tachypneic. Diffuse expiratory wheezes with prolonged expiration.   Abdominal: Soft. Bowel sounds are normal. She exhibits no distension. There is no tenderness. There is no rebound.   Musculoskeletal: Normal range of motion. She exhibits no edema or tenderness.   Lymphadenopathy:     She has no cervical adenopathy.   Neurological: She is alert and oriented to person, place, and time.   Ambulatory with steady gait.   Skin: Skin is warm and dry. No rash noted. No erythema. No pallor.         ED Course   Procedures  Labs Reviewed   COMPREHENSIVE METABOLIC PANEL - Abnormal; Notable for the following components:       Result Value    Albumin 3.2 (*)     All other components within normal limits   CBC W/ AUTO DIFFERENTIAL - Abnormal; Notable for the following components:    RBC 3.90 (*)     Hemoglobin 9.3 (*)     Hematocrit 33.4 (*)     Mean Corpuscular Hemoglobin 23.8 (*)     Mean Corpuscular Hemoglobin Conc 27.8 (*)     RDW 16.4 (*)     All other components within normal limits   TROPONIN I   POCT URINE PREGNANCY     EKG Readings: (Independently Interpreted)   739 - Rate of 95. Normal sinus rhythm. Normal axis. Normal intervals. No ST or ischemic changes.        ECG Results          EKG 12-lead (In process)  Result time 12/26/19 08:32:35    In process by Interface, Lab In [facility] (12/26/19 08:32:35)                 Narrative:    Test Reason : R07.9,    Vent. Rate : 095 BPM     Atrial Rate : 095 BPM     P-R Int : 128 ms          QRS  Dur : 078 ms      QT Int : 358 ms       P-R-T Axes : 000 087 062 degrees     QTc Int : 449 ms    Normal sinus rhythm  Normal ECG      Referred By: System System           Confirmed By:                             Imaging Results          X-Ray Chest AP Portable (Final result)  Result time 12/26/19 08:06:44    Final result by Liss Mitchell MD (12/26/19 08:06:44)                 Impression:      No definite acute process seen.      Electronically signed by: Liss Mitchell MD  Date:    12/26/2019  Time:    08:06             Narrative:    EXAMINATION:  XR CHEST AP PORTABLE    CLINICAL HISTORY:  Asthma;    TECHNIQUE:  Single frontal view of the chest was performed.    COMPARISON:  09/13/2018    FINDINGS:  Sternotomy wires noted some of which appear fractured.  Sternotomy wires.  Prosthetic heart valve replacement.  The cardiac silhouette is normal in size.  The pulmonary vascularity is normal.  The lungs are clear.  No pleural effusion.  No pneumothorax.  The osseous structures appear normal.                              X-Rays:   Independently Interpreted Readings:   Chest X-Ray: Trachea midline. No cardiomegaly. No effusion, edema or pneumothorax.      Medical Decision Making:   History:   Old Medical Records: I decided to obtain old medical records.  Old Records Summarized: other records.  Initial Assessment:   7:34AM:  Patient is a 49-year-old female who presents to the emergency department with acute onset of cough, shortness of breath, chest tightness and sneezing.  Patient has increased work of breathing with diffuse wheezing, tachypnea and prolonged expiration.  Will plan for treatments, x-ray, will continue to follow and reassess.  Independently Interpreted Test(s):   I have ordered and independently interpreted X-rays - see prior notes.  I have ordered and independently interpreted EKG Reading(s) - see prior notes  Clinical Tests:   Lab Tests: Ordered and Reviewed  Radiological Study: Ordered and  Reviewed  Medical Tests: Ordered and Reviewed      9:53 AM:  Pt feeling much better after a continuous neb treatment.  Her breath sounds are much improved.  Will continue to follow.    10:44 AM:  Pt doing well, continues to feel better and remains stable. She was able to ambulate with only a mild drop in her O2 sats, but still maintained over 90%.  Her x-ray and labs are otherwise unremarkable. Patient likely having an asthma exacerbation, likely triggered by a viral illness.  Will plan to provide her with a prescription for nebulizer treatments, inhaler, and prednisone.  I do not feel that further workup in emergency room is indicated at this time.  I updated the patient regarding results and I counseled the patient regarding supportive care measures.  I have discussed with the pt ED return warnings and need for close PCP f/u.  Pt agreeable to plan and all questions answered.  I feel that pt is stable for discharge and management as an outpatient and no further intervention is needed at this time.  Pt is comfortable returning to the ED if needed.  Will DC home in stable condition.                  Scribe Attestation:   Scribe #1: I performed the above scribed service and the documentation accurately describes the services I performed. I attest to the accuracy of the note.    Attending Attestation:           Physician Attestation for Scribe:  Physician Attestation Statement for Scribe #1: I, Dr. Jones, reviewed documentation, as scribed by Marlys Valverde in my presence, and it is both accurate and complete.                               Clinical Impression:     1. Exacerbation of asthma, unspecified asthma severity, unspecified whether persistent    2. Chest pain                                Bharati Jones MD  12/26/19 6009

## 2019-12-26 NOTE — ED NOTES
Bed rails are up and call light is within patient reach. Bed in lowest position. Will continue to monitor.

## 2019-12-26 NOTE — DISCHARGE INSTRUCTIONS
We have prescribed you breathing treatments and steroids. Please fill and take as directed.    Please return to the ER if you have chest pain, difficulty breathing, fevers, altered mental status, dizziness, weakness, or any other concerns.      Follow up with your primary care physician.

## 2020-01-18 ENCOUNTER — HOSPITAL ENCOUNTER (EMERGENCY)
Facility: OTHER | Age: 50
Discharge: HOME | End: 2020-01-18
Attending: EMERGENCY MEDICINE
Payer: MEDICAID

## 2020-01-18 VITALS
WEIGHT: 285 LBS | OXYGEN SATURATION: 93 % | HEIGHT: 67 IN | SYSTOLIC BLOOD PRESSURE: 135 MMHG | DIASTOLIC BLOOD PRESSURE: 59 MMHG | HEART RATE: 92 BPM | BODY MASS INDEX: 44.73 KG/M2 | RESPIRATION RATE: 20 BRPM | TEMPERATURE: 99 F

## 2020-01-18 DIAGNOSIS — R06.02 SHORTNESS OF BREATH: ICD-10-CM

## 2020-01-18 DIAGNOSIS — R07.9 CHEST PAIN: ICD-10-CM

## 2020-01-18 DIAGNOSIS — J44.1 COPD EXACERBATION: Primary | ICD-10-CM

## 2020-01-18 LAB
ALBUMIN SERPL BCP-MCNC: 3.3 G/DL (ref 3.5–5.2)
ALP SERPL-CCNC: 93 U/L (ref 55–135)
ALT SERPL W/O P-5'-P-CCNC: 14 U/L (ref 10–44)
ANION GAP SERPL CALC-SCNC: 8 MMOL/L (ref 8–16)
AST SERPL-CCNC: 19 U/L (ref 10–40)
BASOPHILS # BLD AUTO: 0.04 K/UL (ref 0–0.2)
BASOPHILS NFR BLD: 0.8 % (ref 0–1.9)
BILIRUB SERPL-MCNC: 0.3 MG/DL (ref 0.1–1)
BNP SERPL-MCNC: 85 PG/ML (ref 0–99)
BUN SERPL-MCNC: 9 MG/DL (ref 6–20)
CALCIUM SERPL-MCNC: 9.2 MG/DL (ref 8.7–10.5)
CHLORIDE SERPL-SCNC: 109 MMOL/L (ref 95–110)
CO2 SERPL-SCNC: 25 MMOL/L (ref 23–29)
CREAT SERPL-MCNC: 0.8 MG/DL (ref 0.5–1.4)
DIFFERENTIAL METHOD: ABNORMAL
EOSINOPHIL # BLD AUTO: 0.2 K/UL (ref 0–0.5)
EOSINOPHIL NFR BLD: 3.8 % (ref 0–8)
ERYTHROCYTE [DISTWIDTH] IN BLOOD BY AUTOMATED COUNT: 16.6 % (ref 11.5–14.5)
EST. GFR  (AFRICAN AMERICAN): >60 ML/MIN/1.73 M^2
EST. GFR  (NON AFRICAN AMERICAN): >60 ML/MIN/1.73 M^2
GLUCOSE SERPL-MCNC: 92 MG/DL (ref 70–110)
HCT VFR BLD AUTO: 31.9 % (ref 37–48.5)
HGB BLD-MCNC: 8.8 G/DL (ref 12–16)
IMM GRANULOCYTES # BLD AUTO: 0.01 K/UL (ref 0–0.04)
IMM GRANULOCYTES NFR BLD AUTO: 0.2 % (ref 0–0.5)
LYMPHOCYTES # BLD AUTO: 1.6 K/UL (ref 1–4.8)
LYMPHOCYTES NFR BLD: 29.6 % (ref 18–48)
MCH RBC QN AUTO: 23.8 PG (ref 27–31)
MCHC RBC AUTO-ENTMCNC: 27.6 G/DL (ref 32–36)
MCV RBC AUTO: 86 FL (ref 82–98)
MONOCYTES # BLD AUTO: 0.5 K/UL (ref 0.3–1)
MONOCYTES NFR BLD: 8.9 % (ref 4–15)
NEUTROPHILS # BLD AUTO: 3 K/UL (ref 1.8–7.7)
NEUTROPHILS NFR BLD: 56.7 % (ref 38–73)
NRBC BLD-RTO: 0 /100 WBC
PLATELET # BLD AUTO: 285 K/UL (ref 150–350)
PMV BLD AUTO: 9.7 FL (ref 9.2–12.9)
POTASSIUM SERPL-SCNC: 4.4 MMOL/L (ref 3.5–5.1)
PROT SERPL-MCNC: 6.9 G/DL (ref 6–8.4)
RBC # BLD AUTO: 3.69 M/UL (ref 4–5.4)
SODIUM SERPL-SCNC: 142 MMOL/L (ref 136–145)
TROPONIN I SERPL DL<=0.01 NG/ML-MCNC: 0.01 NG/ML (ref 0–0.03)
TROPONIN I SERPL DL<=0.01 NG/ML-MCNC: 0.01 NG/ML (ref 0–0.03)
WBC # BLD AUTO: 5.27 K/UL (ref 3.9–12.7)

## 2020-01-18 PROCEDURE — 85025 COMPLETE CBC W/AUTO DIFF WBC: CPT

## 2020-01-18 PROCEDURE — 25000003 PHARM REV CODE 250: Performed by: EMERGENCY MEDICINE

## 2020-01-18 PROCEDURE — 93010 EKG 12-LEAD: ICD-10-PCS | Mod: ,,, | Performed by: INTERNAL MEDICINE

## 2020-01-18 PROCEDURE — 99285 EMERGENCY DEPT VISIT HI MDM: CPT | Mod: 25

## 2020-01-18 PROCEDURE — 25000242 PHARM REV CODE 250 ALT 637 W/ HCPCS: Performed by: EMERGENCY MEDICINE

## 2020-01-18 PROCEDURE — 63600175 PHARM REV CODE 636 W HCPCS: Performed by: EMERGENCY MEDICINE

## 2020-01-18 PROCEDURE — 84484 ASSAY OF TROPONIN QUANT: CPT

## 2020-01-18 PROCEDURE — 94640 AIRWAY INHALATION TREATMENT: CPT

## 2020-01-18 PROCEDURE — 93010 ELECTROCARDIOGRAM REPORT: CPT | Mod: ,,, | Performed by: INTERNAL MEDICINE

## 2020-01-18 PROCEDURE — 93005 ELECTROCARDIOGRAM TRACING: CPT

## 2020-01-18 PROCEDURE — 83880 ASSAY OF NATRIURETIC PEPTIDE: CPT

## 2020-01-18 PROCEDURE — 80053 COMPREHEN METABOLIC PANEL: CPT

## 2020-01-18 RX ORDER — ASPIRIN 325 MG
325 TABLET ORAL
Status: COMPLETED | OUTPATIENT
Start: 2020-01-18 | End: 2020-01-18

## 2020-01-18 RX ORDER — BENZONATATE 100 MG/1
100 CAPSULE ORAL 3 TIMES DAILY PRN
Qty: 20 CAPSULE | Refills: 0 | Status: SHIPPED | OUTPATIENT
Start: 2020-01-18 | End: 2020-01-28

## 2020-01-18 RX ORDER — ALBUTEROL SULFATE 90 UG/1
1-2 AEROSOL, METERED RESPIRATORY (INHALATION) EVERY 6 HOURS PRN
Qty: 1 INHALER | Refills: 0 | Status: SHIPPED | OUTPATIENT
Start: 2020-01-18 | End: 2020-02-17

## 2020-01-18 RX ORDER — IPRATROPIUM BROMIDE AND ALBUTEROL SULFATE 2.5; .5 MG/3ML; MG/3ML
3 SOLUTION RESPIRATORY (INHALATION)
Status: COMPLETED | OUTPATIENT
Start: 2020-01-18 | End: 2020-01-18

## 2020-01-18 RX ORDER — METHYLPREDNISOLONE SOD SUCC 125 MG
125 VIAL (EA) INJECTION
Status: COMPLETED | OUTPATIENT
Start: 2020-01-18 | End: 2020-01-18

## 2020-01-18 RX ORDER — PREDNISONE 20 MG/1
40 TABLET ORAL DAILY
Qty: 10 TABLET | Refills: 0 | Status: SHIPPED | OUTPATIENT
Start: 2020-01-18 | End: 2020-01-23

## 2020-01-18 RX ADMIN — METHYLPREDNISOLONE SODIUM SUCCINATE 125 MG: 125 INJECTION, POWDER, FOR SOLUTION INTRAMUSCULAR; INTRAVENOUS at 10:01

## 2020-01-18 RX ADMIN — IPRATROPIUM BROMIDE AND ALBUTEROL SULFATE 3 ML: .5; 3 SOLUTION RESPIRATORY (INHALATION) at 10:01

## 2020-01-18 RX ADMIN — ASPIRIN 325 MG ORAL TABLET 325 MG: 325 PILL ORAL at 10:01

## 2020-01-18 RX ADMIN — IPRATROPIUM BROMIDE AND ALBUTEROL SULFATE 3 ML: .5; 3 SOLUTION RESPIRATORY (INHALATION) at 11:01

## 2020-01-18 NOTE — ED NOTES
Pt reports breathing better after treatments. 98% on RA. Resting, appears in no distress. Will monitor.

## 2020-01-20 NOTE — ED PROVIDER NOTES
Encounter Date: 1/18/2020       History     Chief Complaint   Patient presents with    Chest Pain     pt chest pain , sob x 2 days.      This is a 48 y/o F with history of cough, shortness of breath and wheezing that occurred over the past 2 days.  Denies fever or chills.  Nonproductive cough.  Similar to prior presentations for asthma.  Denies orthopnea, PND, endorses POWELL.  C/o chest tightness, especially with coughing.  Denies pleuritic symptoms.     The history is provided by the patient.     Review of patient's allergies indicates:  No Known Allergies  Past Medical History:   Diagnosis Date    Arthritis     Asthma     CHF (congestive heart failure)     Diabetes mellitus     Hypertension      Past Surgical History:   Procedure Laterality Date    CARDIAC SURGERY      TUBAL LIGATION       No family history on file.  Social History     Tobacco Use    Smoking status: Current Every Day Smoker    Smokeless tobacco: Never Used   Substance Use Topics    Alcohol use: No    Drug use: No     Review of Systems   Constitutional: Negative for fever.   HENT: Negative for sore throat.    Respiratory: Positive for cough, chest tightness, shortness of breath and wheezing.    Cardiovascular: Negative for chest pain.   Gastrointestinal: Negative for nausea.   Genitourinary: Negative for dysuria.   Musculoskeletal: Negative for back pain.   Skin: Negative for color change, rash and wound.   Neurological: Negative for weakness.   Hematological: Does not bruise/bleed easily.   All other systems reviewed and are negative.      Physical Exam     Initial Vitals [01/18/20 1026]   BP Pulse Resp Temp SpO2   129/75 88 (!) 24 98.7 °F (37.1 °C) (!) 90 %      MAP       --         Physical Exam    Nursing note and vitals reviewed.  Constitutional: She appears well-developed and well-nourished. No distress.   HENT:   Head: Normocephalic and atraumatic.   Right Ear: External ear normal.   Left Ear: External ear normal.   Eyes:  Conjunctivae and EOM are normal. Pupils are equal, round, and reactive to light. Right eye exhibits no discharge. Left eye exhibits no discharge. No scleral icterus.   Neck: Normal range of motion. Neck supple.   Cardiovascular: Normal rate, regular rhythm and normal heart sounds. Exam reveals no gallop and no friction rub.    No murmur heard.  Pulmonary/Chest: No stridor. She is in respiratory distress. She has wheezes. She has no rhonchi. She has no rales.   Tachypneic    Abdominal: Soft. Bowel sounds are normal. She exhibits no distension and no mass. There is no tenderness. There is no rebound and no guarding.   Musculoskeletal: She exhibits no edema.   Neurological: She is alert and oriented to person, place, and time. She has normal strength. No cranial nerve deficit or sensory deficit. GCS score is 15. GCS eye subscore is 4. GCS verbal subscore is 5. GCS motor subscore is 6.   Skin: Skin is warm and dry. Capillary refill takes less than 2 seconds.   Psychiatric: She has a normal mood and affect. Her behavior is normal. Judgment and thought content normal.         ED Course   Procedures  Labs Reviewed   CBC W/ AUTO DIFFERENTIAL - Abnormal; Notable for the following components:       Result Value    RBC 3.69 (*)     Hemoglobin 8.8 (*)     Hematocrit 31.9 (*)     Mean Corpuscular Hemoglobin 23.8 (*)     Mean Corpuscular Hemoglobin Conc 27.6 (*)     RDW 16.6 (*)     All other components within normal limits   COMPREHENSIVE METABOLIC PANEL - Abnormal; Notable for the following components:    Albumin 3.3 (*)     All other components within normal limits   TROPONIN I   TROPONIN I   B-TYPE NATRIURETIC PEPTIDE     EKG Readings: (Independently Interpreted)   Rhythm: Normal Sinus Rhythm. Heart Rate: 85. Ectopy: No Ectopy. Conduction: Normal. ST Segments: Normal ST Segments. T Waves: Normal. Clinical Impression: Normal Sinus Rhythm       Imaging Results          X-Ray Chest AP Portable (Final result)  Result time  01/18/20 14:23:02    Final result by Dipak Alvarez MD (01/18/20 14:23:02)                 Impression:      No detrimental change or radiographic acute intrathoracic process seen on this limited single view.      Electronically signed by: Dipak Alvarez MD  Date:    01/18/2020  Time:    14:23             Narrative:    EXAMINATION:  XR CHEST AP PORTABLE    CLINICAL HISTORY:  Shortness of breath    TECHNIQUE:  Single frontal view of the chest was performed.    COMPARISON:  Chest radiograph 09/13/2018    FINDINGS:  Monitoring leads overlie the chest.  Patient is slightly rotated.  Large body habitus.    Sternotomy wires, mediastinal clips and prosthetic heart valve replacement appear grossly similar to prior allowing for patient rotation.  Cardiomediastinal silhouette is midline and stable without evidence of failure.  The lungs are symmetrically well expanded without large consolidation or new focal opacity.  No pleural effusion or pneumothorax.  No acute osseous process seen.  PA and lateral views can be obtained.                              X-Rays:   Independently Interpreted Readings:   Chest X-Ray: Normal heart size.  No infiltrates.  No acute abnormalities.     Medical Decision Making:   Initial Assessment:   48 y/o F with SOB, wheezing similar to prior asthma exacerbations.  Plan steroids, duonebs.  Also with hx of CHF, will obtain EKG, troponin, BNP  Differential Diagnosis:   Differential diagnosis includes, but is not limited to:  pulmonary infectious process, allergic rhinitis, postnasal drip, allergic bronchitis, sinusitis, infectious bronchitis, COPD, asthma, pulmonary embolus, pleural effusion, myocardial ischemia, cardiac valvulopathy, and congestive heart failure.    Clinical Tests:   Lab Tests: Ordered and Reviewed  Radiological Study: Ordered and Reviewed  Medical Tests: Ordered and Reviewed  ED Management:  Patient improved with treatment in the emergency department and comfortable going home.  Discussed reasons to return and importance of followup.  Patient understands that the emergency visit today is primarily to address immediate concerns and to rule out emergent cause of symptoms and that they may require further workup and evaluation as an outpatient. All questions addressed and patient given discharge instructions and followup information.                      ED Course as of Jan 28 0611   Sat Jan 18, 2020   1541 Patient is feeling better with no SOB and no pain. She is ready for discharge.     [BL]      ED Course User Index  [BL] Noemy Chula                Clinical Impression:       ICD-10-CM ICD-9-CM   1. COPD exacerbation J44.1 491.21   2. Chest pain R07.9 786.50   3. Shortness of breath R06.02 786.05         Disposition:   Disposition: Discharged  Condition: Stable                     Galilea Gutierrez MD  01/28/20 0613

## 2020-02-15 ENCOUNTER — HOSPITAL ENCOUNTER (EMERGENCY)
Facility: OTHER | Age: 50
Discharge: HOME | End: 2020-02-15
Attending: EMERGENCY MEDICINE
Payer: MEDICAID

## 2020-02-15 VITALS
HEART RATE: 84 BPM | HEIGHT: 68 IN | BODY MASS INDEX: 43.04 KG/M2 | DIASTOLIC BLOOD PRESSURE: 68 MMHG | WEIGHT: 284 LBS | RESPIRATION RATE: 23 BRPM | SYSTOLIC BLOOD PRESSURE: 120 MMHG | OXYGEN SATURATION: 99 % | TEMPERATURE: 98 F

## 2020-02-15 DIAGNOSIS — R07.9 CHEST PAIN, UNSPECIFIED TYPE: ICD-10-CM

## 2020-02-15 DIAGNOSIS — R06.02 SHORTNESS OF BREATH: ICD-10-CM

## 2020-02-15 DIAGNOSIS — R07.9 CHEST PAIN: ICD-10-CM

## 2020-02-15 DIAGNOSIS — D64.9 ANEMIA, UNSPECIFIED TYPE: ICD-10-CM

## 2020-02-15 DIAGNOSIS — J44.1 COPD WITH ACUTE EXACERBATION: Primary | ICD-10-CM

## 2020-02-15 LAB
ALBUMIN SERPL BCP-MCNC: 3.3 G/DL (ref 3.5–5.2)
ALP SERPL-CCNC: 96 U/L (ref 55–135)
ALT SERPL W/O P-5'-P-CCNC: 12 U/L (ref 10–44)
ANION GAP SERPL CALC-SCNC: 10 MMOL/L (ref 8–16)
ANISOCYTOSIS BLD QL SMEAR: SLIGHT
AST SERPL-CCNC: 18 U/L (ref 10–40)
BASOPHILS NFR BLD: 0 % (ref 0–1.9)
BILIRUB SERPL-MCNC: 0.4 MG/DL (ref 0.1–1)
BNP SERPL-MCNC: 80 PG/ML (ref 0–99)
BUN SERPL-MCNC: 8 MG/DL (ref 6–20)
CALCIUM SERPL-MCNC: 8.9 MG/DL (ref 8.7–10.5)
CHLORIDE SERPL-SCNC: 104 MMOL/L (ref 95–110)
CO2 SERPL-SCNC: 27 MMOL/L (ref 23–29)
CREAT SERPL-MCNC: 0.9 MG/DL (ref 0.5–1.4)
DIFFERENTIAL METHOD: ABNORMAL
EOSINOPHIL NFR BLD: 3 % (ref 0–8)
ERYTHROCYTE [DISTWIDTH] IN BLOOD BY AUTOMATED COUNT: 16.7 % (ref 11.5–14.5)
EST. GFR  (AFRICAN AMERICAN): >60 ML/MIN/1.73 M^2
EST. GFR  (NON AFRICAN AMERICAN): >60 ML/MIN/1.73 M^2
EXTRA BLUE TOP RAINBOW: NORMAL
EXTRA RED TOP RAINBOW: NORMAL
GLUCOSE SERPL-MCNC: 88 MG/DL (ref 70–110)
HCT VFR BLD AUTO: 33 % (ref 37–48.5)
HGB BLD-MCNC: 9.2 G/DL (ref 12–16)
HYPOCHROMIA BLD QL SMEAR: ABNORMAL
IMM GRANULOCYTES # BLD AUTO: ABNORMAL K/UL (ref 0–0.04)
IMM GRANULOCYTES NFR BLD AUTO: ABNORMAL % (ref 0–0.5)
LYMPHOCYTES NFR BLD: 32 % (ref 18–48)
MCH RBC QN AUTO: 23.8 PG (ref 27–31)
MCHC RBC AUTO-ENTMCNC: 27.9 G/DL (ref 32–36)
MCV RBC AUTO: 86 FL (ref 82–98)
MONOCYTES NFR BLD: 3 % (ref 4–15)
NEUTROPHILS NFR BLD: 62 % (ref 38–73)
NRBC BLD-RTO: 0 /100 WBC
PLATELET # BLD AUTO: 292 K/UL (ref 150–350)
PMV BLD AUTO: 9.3 FL (ref 9.2–12.9)
POIKILOCYTOSIS BLD QL SMEAR: SLIGHT
POLYCHROMASIA BLD QL SMEAR: ABNORMAL
POTASSIUM SERPL-SCNC: 3.9 MMOL/L (ref 3.5–5.1)
PROT SERPL-MCNC: 7 G/DL (ref 6–8.4)
RBC # BLD AUTO: 3.86 M/UL (ref 4–5.4)
SODIUM SERPL-SCNC: 141 MMOL/L (ref 136–145)
TROPONIN I SERPL DL<=0.01 NG/ML-MCNC: <0.006 NG/ML (ref 0–0.03)
TROPONIN I SERPL DL<=0.01 NG/ML-MCNC: <0.006 NG/ML (ref 0–0.03)
WBC # BLD AUTO: 5.74 K/UL (ref 3.9–12.7)

## 2020-02-15 PROCEDURE — 83880 ASSAY OF NATRIURETIC PEPTIDE: CPT

## 2020-02-15 PROCEDURE — 94640 AIRWAY INHALATION TREATMENT: CPT

## 2020-02-15 PROCEDURE — 85007 BL SMEAR W/DIFF WBC COUNT: CPT

## 2020-02-15 PROCEDURE — 63600175 PHARM REV CODE 636 W HCPCS: Performed by: EMERGENCY MEDICINE

## 2020-02-15 PROCEDURE — 93010 ELECTROCARDIOGRAM REPORT: CPT | Mod: ,,, | Performed by: INTERNAL MEDICINE

## 2020-02-15 PROCEDURE — 93005 ELECTROCARDIOGRAM TRACING: CPT

## 2020-02-15 PROCEDURE — 25000003 PHARM REV CODE 250: Performed by: EMERGENCY MEDICINE

## 2020-02-15 PROCEDURE — 80053 COMPREHEN METABOLIC PANEL: CPT

## 2020-02-15 PROCEDURE — 99284 EMERGENCY DEPT VISIT MOD MDM: CPT | Mod: 25

## 2020-02-15 PROCEDURE — 85027 COMPLETE CBC AUTOMATED: CPT

## 2020-02-15 PROCEDURE — 25000242 PHARM REV CODE 250 ALT 637 W/ HCPCS: Performed by: EMERGENCY MEDICINE

## 2020-02-15 PROCEDURE — 84484 ASSAY OF TROPONIN QUANT: CPT

## 2020-02-15 PROCEDURE — 96374 THER/PROPH/DIAG INJ IV PUSH: CPT

## 2020-02-15 PROCEDURE — 93010 EKG 12-LEAD: ICD-10-PCS | Mod: ,,, | Performed by: INTERNAL MEDICINE

## 2020-02-15 PROCEDURE — 94761 N-INVAS EAR/PLS OXIMETRY MLT: CPT

## 2020-02-15 RX ORDER — METHYLPREDNISOLONE SOD SUCC 125 MG
125 VIAL (EA) INJECTION
Status: COMPLETED | OUTPATIENT
Start: 2020-02-15 | End: 2020-02-15

## 2020-02-15 RX ORDER — PREDNISONE 20 MG/1
40 TABLET ORAL DAILY
Qty: 6 TABLET | Refills: 0 | Status: SHIPPED | OUTPATIENT
Start: 2020-02-15 | End: 2020-02-18

## 2020-02-15 RX ORDER — ALBUTEROL SULFATE 90 UG/1
1-2 AEROSOL, METERED RESPIRATORY (INHALATION) EVERY 6 HOURS PRN
Qty: 8 G | Refills: 0 | Status: ON HOLD | OUTPATIENT
Start: 2020-02-15 | End: 2020-02-29 | Stop reason: SDUPTHER

## 2020-02-15 RX ORDER — ASPIRIN 325 MG
325 TABLET ORAL
Status: COMPLETED | OUTPATIENT
Start: 2020-02-15 | End: 2020-02-15

## 2020-02-15 RX ORDER — ALBUTEROL SULFATE 0.83 MG/ML
10 SOLUTION RESPIRATORY (INHALATION)
Status: COMPLETED | OUTPATIENT
Start: 2020-02-15 | End: 2020-02-15

## 2020-02-15 RX ORDER — IPRATROPIUM BROMIDE AND ALBUTEROL SULFATE 2.5; .5 MG/3ML; MG/3ML
3 SOLUTION RESPIRATORY (INHALATION)
Status: COMPLETED | OUTPATIENT
Start: 2020-02-15 | End: 2020-02-15

## 2020-02-15 RX ADMIN — IPRATROPIUM BROMIDE AND ALBUTEROL SULFATE 3 ML: .5; 3 SOLUTION RESPIRATORY (INHALATION) at 03:02

## 2020-02-15 RX ADMIN — ASPIRIN 325 MG ORAL TABLET 325 MG: 325 PILL ORAL at 01:02

## 2020-02-15 RX ADMIN — METHYLPREDNISOLONE SODIUM SUCCINATE 125 MG: 125 INJECTION, POWDER, FOR SOLUTION INTRAMUSCULAR; INTRAVENOUS at 02:02

## 2020-02-15 RX ADMIN — ALBUTEROL SULFATE 10 MG: 2.5 SOLUTION RESPIRATORY (INHALATION) at 02:02

## 2020-02-15 NOTE — ED TRIAGE NOTES
Pt. Presents to the ER with pain to her back, chest, and left shoulder for the past 3 days. Pt. States that the pain started in her shoulder 3 days ago and moved to her back and chest last night. Pt. States the pain has been constant and states that this feels like when she was here last with a COPD exacerbation. Pt. Reports feelings of having a cold, more difficulty breathing than normal, and congestion. Pt. Denies any other symptoms. Pt. Breathing even and slightly labored without use of accessory muscles. Pt. Has diminished breath sounds bilaterally. Oxygen saturation is however at 100% on room air. Pt. Ambulatory with strong steady gait. GCS 15. AAOx4. Pt. Otherwise stable.

## 2020-02-15 NOTE — ED PROVIDER NOTES
"Encounter Date: 2/15/2020    SCRIBE #1 NOTE: I, Malu Palmer, am scribing for, and in the presence of, Dr. Lara.       History     Chief Complaint   Patient presents with    Chest Pain     with upper back pain.  Also reports SOB and wheezing, reports hx of asthma and anxiety.  States pain started in L shoulder and moved to chest and back yesterday.  states shoulder no longer hurting.  Speaking in full sentences.     Time seen by provider: 1:37 PM    This is a 49 y.o. female who presents with complaint of chest pain that began yesterday. Patient describes pain as "sharp" and worse with deep inspiration. She reports associated SOB on exertion and upper back pain. She denies fever, cough, nausea, or vomiting. Patient notes symptoms are consistent with previous COPD exacerbation. She reports tobacco use. She denies alcohol consumption or drug use. Patient reports surgical history of cardiac valve replacement.    The history is provided by the patient.     Review of patient's allergies indicates:  No Known Allergies  Past Medical History:   Diagnosis Date    Arthritis     Asthma     CHF (congestive heart failure)     Diabetes mellitus     Hypertension      Past Surgical History:   Procedure Laterality Date    CARDIAC SURGERY      TUBAL LIGATION       History reviewed. No pertinent family history.  Social History     Tobacco Use    Smoking status: Current Every Day Smoker    Smokeless tobacco: Never Used   Substance Use Topics    Alcohol use: No    Drug use: No     Review of Systems   Constitutional: Negative for chills and fever.   HENT: Negative for congestion, sore throat and trouble swallowing.    Eyes: Negative for photophobia and visual disturbance.   Respiratory: Positive for shortness of breath. Negative for cough and chest tightness.    Cardiovascular: Positive for chest pain.   Gastrointestinal: Negative for abdominal pain, nausea and vomiting.   Endocrine: Negative for polydipsia and polyuria. "   Genitourinary: Negative for difficulty urinating, dysuria and flank pain.   Musculoskeletal: Positive for back pain. Negative for neck pain.   Skin: Negative for rash.   Neurological: Negative for weakness and headaches.   Hematological: Does not bruise/bleed easily.   Psychiatric/Behavioral: Negative for confusion.       Physical Exam     Initial Vitals [02/15/20 1256]   BP Pulse Resp Temp SpO2   (!) 148/71 88 20 97.7 °F (36.5 °C) 100 %      MAP       --         Physical Exam    Nursing note and vitals reviewed.  Constitutional: She appears well-developed and well-nourished. She is cooperative.  Non-toxic appearance. No distress.   HENT:   Head: Normocephalic and atraumatic.   Mouth/Throat: Oropharynx is clear and moist.   Eyes: Conjunctivae and EOM are normal. Pupils are equal, round, and reactive to light.   Neck: Normal range of motion and full passive range of motion without pain. Neck supple. No thyromegaly present. No tracheal deviation present.   Cardiovascular: Normal rate, regular rhythm, normal heart sounds and normal pulses.   Pulmonary/Chest: Effort normal. No respiratory distress. She has wheezes.   Decreased breath sounds bilaterally. Inspiratory and expiratory wheezing.   Abdominal: Soft. Normal appearance and bowel sounds are normal. There is no tenderness.   Musculoskeletal: Normal range of motion.   Neurological: She is alert and oriented to person, place, and time. She has normal strength. No cranial nerve deficit or sensory deficit.   Skin: Skin is warm, dry and intact. No rash noted.   Psychiatric: Her speech is normal. Thought content normal.         ED Course   Procedures  Labs Reviewed   CBC W/ AUTO DIFFERENTIAL - Abnormal; Notable for the following components:       Result Value    RBC 3.86 (*)     Hemoglobin 9.2 (*)     Hematocrit 33.0 (*)     Mean Corpuscular Hemoglobin 23.8 (*)     Mean Corpuscular Hemoglobin Conc 27.9 (*)     RDW 16.7 (*)     Mono% 3.0 (*)     All other components  within normal limits   COMPREHENSIVE METABOLIC PANEL - Abnormal; Notable for the following components:    Albumin 3.3 (*)     All other components within normal limits   TROPONIN I   B-TYPE NATRIURETIC PEPTIDE   BLUE-TOP TUBE   RED-TOP TUBE   TROPONIN I          Imaging Results          X-Ray Chest AP Portable (Final result)  Result time 02/15/20 14:59:43    Final result by José Manuel Romero MD (02/15/20 14:59:43)                 Impression:      Stable chest      Electronically signed by: José Manuel Romero MD  Date:    02/15/2020  Time:    14:59             Narrative:    EXAMINATION:  XR CHEST AP PORTABLE    CLINICAL HISTORY:  Chest Pain;    TECHNIQUE:  Single frontal view of the chest was performed.    COMPARISON:  01/18/2020    FINDINGS:  Cardiac silhouette and mediastinal contour stable.  Lungs demonstrate no focal/acute opacity.  Osseous structures unchanged.                                 Medical Decision Making:   History:   Old Medical Records: I decided to obtain old medical records.  Independently Interpreted Test(s):   I have ordered and independently interpreted X-rays - see prior notes.  I have ordered and independently interpreted EKG Reading(s) - see prior notes  Clinical Tests:   Lab Tests: Ordered and Reviewed  Radiological Study: Reviewed and Ordered  Medical Tests: Ordered and Reviewed            Scribe Attestation:   Scribe #1: I performed the above scribed service and the documentation accurately describes the services I performed. I attest to the accuracy of the note.    Attending Attestation:           Physician Attestation for Scribe:  Physician Attestation Statement for Scribe #1: I, Dr. Lara, reviewed documentation, as scribed by Malu Palmer in my presence, and it is both accurate and complete.         Attending ED Notes:   Emergent evaluation a 49-year-old female with past medical history of COPD now presents to the emergency room with chief complaint of shortness of breath,  wheezing and chest pain.  Patient is afebrile, nontoxic-appearing stable vital signs.  Patient in no acute respiratory distress.  Patient has no elevation white blood cell count.  H&H 9.2 and 33.  No acute findings on CMP.  BNP is within normal limits. No acute findings on chest x-ray.  No acute findings on EKG.  Two sets of troponins 3 hr apart are within normal limits. On re-evaluation after nebulized breathing treatments, lungs are clear to auscultation bilaterally.  Oxygen saturation is 100% on room air.  Patient states that all symptoms have since resolved.  The patient is extensively counseled her diagnosis and treatment including all diagnostic laboratory and physical exam findings.  The patient is discharged good condition and directed follow-up with Cardiology in the next 24-48 hours.          ED Course as of Feb 15 1741   Sat Feb 15, 2020   1451 Patient has increased breath sounds bilaterally but persistent inspiratory and expiratory wheezing. Will administer another set of breathing treatments.    [KW]      ED Course User Index  [KW] Malu Palmer                Clinical Impression:     1. COPD with acute exacerbation    2. Chest pain    3. Shortness of breath    4. Chest pain, unspecified type    5. Anemia, unspecified type                                Babka Lara MD  02/15/20 9543

## 2020-02-27 ENCOUNTER — HOSPITAL ENCOUNTER (OUTPATIENT)
Facility: OTHER | Age: 50
Discharge: HOME | End: 2020-02-29
Attending: EMERGENCY MEDICINE | Admitting: INTERNAL MEDICINE
Payer: MEDICAID

## 2020-02-27 DIAGNOSIS — Z72.0 CONTINUOUS TOBACCO ABUSE: ICD-10-CM

## 2020-02-27 DIAGNOSIS — J45.41 MODERATE PERSISTENT ASTHMA WITH ACUTE EXACERBATION: ICD-10-CM

## 2020-02-27 DIAGNOSIS — E11.9 TYPE 2 DIABETES MELLITUS WITHOUT COMPLICATION, WITHOUT LONG-TERM CURRENT USE OF INSULIN: ICD-10-CM

## 2020-02-27 DIAGNOSIS — Z79.01 CHRONIC ANTICOAGULATION: ICD-10-CM

## 2020-02-27 DIAGNOSIS — E66.01 MORBID OBESITY: ICD-10-CM

## 2020-02-27 DIAGNOSIS — J44.1 COPD WITH ACUTE EXACERBATION: ICD-10-CM

## 2020-02-27 DIAGNOSIS — I10 ESSENTIAL HYPERTENSION: ICD-10-CM

## 2020-02-27 DIAGNOSIS — I44.30 AV BLOCK: ICD-10-CM

## 2020-02-27 DIAGNOSIS — Z95.2 S/P MVR (MITRAL VALVE REPLACEMENT): ICD-10-CM

## 2020-02-27 LAB
ALBUMIN SERPL BCP-MCNC: 3.2 G/DL (ref 3.5–5.2)
ALP SERPL-CCNC: 86 U/L (ref 55–135)
ALT SERPL W/O P-5'-P-CCNC: 14 U/L (ref 10–44)
ANION GAP SERPL CALC-SCNC: 7 MMOL/L (ref 8–16)
AST SERPL-CCNC: 16 U/L (ref 10–40)
BASOPHILS # BLD AUTO: 0.03 K/UL (ref 0–0.2)
BASOPHILS NFR BLD: 0.4 % (ref 0–1.9)
BILIRUB SERPL-MCNC: 0.4 MG/DL (ref 0.1–1)
BNP SERPL-MCNC: 39 PG/ML (ref 0–99)
BUN SERPL-MCNC: 11 MG/DL (ref 6–20)
CALCIUM SERPL-MCNC: 8.5 MG/DL (ref 8.7–10.5)
CHLORIDE SERPL-SCNC: 106 MMOL/L (ref 95–110)
CO2 SERPL-SCNC: 26 MMOL/L (ref 23–29)
CREAT SERPL-MCNC: 0.9 MG/DL (ref 0.5–1.4)
CTP QC/QA: YES
DIFFERENTIAL METHOD: ABNORMAL
EOSINOPHIL # BLD AUTO: 0.2 K/UL (ref 0–0.5)
EOSINOPHIL NFR BLD: 3 % (ref 0–8)
ERYTHROCYTE [DISTWIDTH] IN BLOOD BY AUTOMATED COUNT: 17.1 % (ref 11.5–14.5)
EST. GFR  (AFRICAN AMERICAN): >60 ML/MIN/1.73 M^2
EST. GFR  (NON AFRICAN AMERICAN): >60 ML/MIN/1.73 M^2
ESTIMATED AVG GLUCOSE: 120 MG/DL (ref 68–131)
GLUCOSE SERPL-MCNC: 111 MG/DL (ref 70–110)
HBA1C MFR BLD HPLC: 5.8 % (ref 4–5.6)
HCT VFR BLD AUTO: 32.2 % (ref 37–48.5)
HGB BLD-MCNC: 8.6 G/DL (ref 12–16)
IMM GRANULOCYTES # BLD AUTO: 0.03 K/UL (ref 0–0.04)
IMM GRANULOCYTES NFR BLD AUTO: 0.4 % (ref 0–0.5)
INR PPP: 1.6 (ref 0.8–1.2)
LACTATE SERPL-SCNC: 0.8 MMOL/L (ref 0.5–2.2)
LYMPHOCYTES # BLD AUTO: 0.9 K/UL (ref 1–4.8)
LYMPHOCYTES NFR BLD: 11.2 % (ref 18–48)
MCH RBC QN AUTO: 23.3 PG (ref 27–31)
MCHC RBC AUTO-ENTMCNC: 26.7 G/DL (ref 32–36)
MCV RBC AUTO: 87 FL (ref 82–98)
MONOCYTES # BLD AUTO: 0.5 K/UL (ref 0.3–1)
MONOCYTES NFR BLD: 6.6 % (ref 4–15)
NEUTROPHILS # BLD AUTO: 6.3 K/UL (ref 1.8–7.7)
NEUTROPHILS NFR BLD: 78.4 % (ref 38–73)
NRBC BLD-RTO: 0 /100 WBC
PLATELET # BLD AUTO: 258 K/UL (ref 150–350)
PMV BLD AUTO: 9.6 FL (ref 9.2–12.9)
POC MOLECULAR INFLUENZA A AGN: NEGATIVE
POC MOLECULAR INFLUENZA B AGN: NEGATIVE
POCT GLUCOSE: 125 MG/DL (ref 70–110)
POCT GLUCOSE: 134 MG/DL (ref 70–110)
POCT GLUCOSE: 97 MG/DL (ref 70–110)
POTASSIUM SERPL-SCNC: 4 MMOL/L (ref 3.5–5.1)
PROT SERPL-MCNC: 6.9 G/DL (ref 6–8.4)
PROTHROMBIN TIME: 18.1 SEC (ref 9–12.5)
RBC # BLD AUTO: 3.69 M/UL (ref 4–5.4)
SODIUM SERPL-SCNC: 139 MMOL/L (ref 136–145)
TROPONIN I SERPL DL<=0.01 NG/ML-MCNC: <0.006 NG/ML (ref 0–0.03)
WBC # BLD AUTO: 8.01 K/UL (ref 3.9–12.7)

## 2020-02-27 PROCEDURE — 36000 PLACE NEEDLE IN VEIN: CPT

## 2020-02-27 PROCEDURE — 83880 ASSAY OF NATRIURETIC PEPTIDE: CPT

## 2020-02-27 PROCEDURE — 63600175 PHARM REV CODE 636 W HCPCS: Performed by: NURSE PRACTITIONER

## 2020-02-27 PROCEDURE — 25000242 PHARM REV CODE 250 ALT 637 W/ HCPCS: Performed by: NURSE PRACTITIONER

## 2020-02-27 PROCEDURE — 99220 PR INITIAL OBSERVATION CARE,LEVL III: CPT | Mod: ,,, | Performed by: NURSE PRACTITIONER

## 2020-02-27 PROCEDURE — 25000242 PHARM REV CODE 250 ALT 637 W/ HCPCS: Performed by: EMERGENCY MEDICINE

## 2020-02-27 PROCEDURE — 96372 THER/PROPH/DIAG INJ SC/IM: CPT

## 2020-02-27 PROCEDURE — 94644 CONT INHLJ TX 1ST HOUR: CPT

## 2020-02-27 PROCEDURE — 94640 AIRWAY INHALATION TREATMENT: CPT

## 2020-02-27 PROCEDURE — 25000003 PHARM REV CODE 250: Performed by: NURSE PRACTITIONER

## 2020-02-27 PROCEDURE — 80053 COMPREHEN METABOLIC PANEL: CPT

## 2020-02-27 PROCEDURE — 87040 BLOOD CULTURE FOR BACTERIA: CPT | Mod: 59

## 2020-02-27 PROCEDURE — 99220 PR INITIAL OBSERVATION CARE,LEVL III: ICD-10-PCS | Mod: ,,, | Performed by: NURSE PRACTITIONER

## 2020-02-27 PROCEDURE — 94799 UNLISTED PULMONARY SVC/PX: CPT

## 2020-02-27 PROCEDURE — 99900035 HC TECH TIME PER 15 MIN (STAT)

## 2020-02-27 PROCEDURE — 85610 PROTHROMBIN TIME: CPT

## 2020-02-27 PROCEDURE — 94761 N-INVAS EAR/PLS OXIMETRY MLT: CPT

## 2020-02-27 PROCEDURE — 99291 CRITICAL CARE FIRST HOUR: CPT | Mod: 25

## 2020-02-27 PROCEDURE — 84484 ASSAY OF TROPONIN QUANT: CPT

## 2020-02-27 PROCEDURE — G0378 HOSPITAL OBSERVATION PER HR: HCPCS

## 2020-02-27 PROCEDURE — 93005 ELECTROCARDIOGRAM TRACING: CPT

## 2020-02-27 PROCEDURE — 83605 ASSAY OF LACTIC ACID: CPT

## 2020-02-27 PROCEDURE — 85025 COMPLETE CBC W/AUTO DIFF WBC: CPT

## 2020-02-27 PROCEDURE — 93010 ELECTROCARDIOGRAM REPORT: CPT | Mod: ,,, | Performed by: INTERNAL MEDICINE

## 2020-02-27 PROCEDURE — 93010 EKG 12-LEAD: ICD-10-PCS | Mod: ,,, | Performed by: INTERNAL MEDICINE

## 2020-02-27 PROCEDURE — 83036 HEMOGLOBIN GLYCOSYLATED A1C: CPT

## 2020-02-27 PROCEDURE — 36415 COLL VENOUS BLD VENIPUNCTURE: CPT

## 2020-02-27 PROCEDURE — 27000221 HC OXYGEN, UP TO 24 HOURS

## 2020-02-27 PROCEDURE — 27100107 HC POCKET PEAK FLOW METER

## 2020-02-27 RX ORDER — IPRATROPIUM BROMIDE AND ALBUTEROL SULFATE 2.5; .5 MG/3ML; MG/3ML
3 SOLUTION RESPIRATORY (INHALATION) EVERY 4 HOURS
Status: DISCONTINUED | OUTPATIENT
Start: 2020-02-27 | End: 2020-02-29 | Stop reason: HOSPADM

## 2020-02-27 RX ORDER — ENOXAPARIN SODIUM 100 MG/ML
1 INJECTION SUBCUTANEOUS
Status: DISCONTINUED | OUTPATIENT
Start: 2020-02-27 | End: 2020-02-27

## 2020-02-27 RX ORDER — CYCLOBENZAPRINE HCL 10 MG
10 TABLET ORAL 3 TIMES DAILY
Status: ON HOLD | COMMUNITY
End: 2023-08-24 | Stop reason: HOSPADM

## 2020-02-27 RX ORDER — ACETAMINOPHEN 325 MG/1
650 TABLET ORAL EVERY 4 HOURS PRN
Status: DISCONTINUED | OUTPATIENT
Start: 2020-02-27 | End: 2020-02-29 | Stop reason: HOSPADM

## 2020-02-27 RX ORDER — SODIUM CHLORIDE 0.9 % (FLUSH) 0.9 %
10 SYRINGE (ML) INJECTION
Status: DISCONTINUED | OUTPATIENT
Start: 2020-02-27 | End: 2020-02-27

## 2020-02-27 RX ORDER — ATORVASTATIN CALCIUM 20 MG/1
40 TABLET, FILM COATED ORAL DAILY
Status: DISCONTINUED | OUTPATIENT
Start: 2020-02-27 | End: 2020-02-29 | Stop reason: HOSPADM

## 2020-02-27 RX ORDER — CARVEDILOL 6.25 MG/1
25 TABLET ORAL 2 TIMES DAILY WITH MEALS
Status: ON HOLD | COMMUNITY
End: 2023-08-24 | Stop reason: HOSPADM

## 2020-02-27 RX ORDER — ONDANSETRON 8 MG/1
8 TABLET, ORALLY DISINTEGRATING ORAL EVERY 8 HOURS PRN
Status: DISCONTINUED | OUTPATIENT
Start: 2020-02-27 | End: 2020-02-29 | Stop reason: HOSPADM

## 2020-02-27 RX ORDER — IBUPROFEN 200 MG
24 TABLET ORAL
Status: DISCONTINUED | OUTPATIENT
Start: 2020-02-27 | End: 2020-02-29 | Stop reason: HOSPADM

## 2020-02-27 RX ORDER — ONDANSETRON 2 MG/ML
4 INJECTION INTRAMUSCULAR; INTRAVENOUS EVERY 8 HOURS PRN
Status: CANCELLED | OUTPATIENT
Start: 2020-02-27

## 2020-02-27 RX ORDER — FLUTICASONE FUROATE AND VILANTEROL 100; 25 UG/1; UG/1
1 POWDER RESPIRATORY (INHALATION) DAILY
Status: DISCONTINUED | OUTPATIENT
Start: 2020-02-27 | End: 2020-02-29 | Stop reason: HOSPADM

## 2020-02-27 RX ORDER — CARVEDILOL 6.25 MG/1
6.25 TABLET ORAL 2 TIMES DAILY WITH MEALS
Status: DISCONTINUED | OUTPATIENT
Start: 2020-02-27 | End: 2020-02-29 | Stop reason: HOSPADM

## 2020-02-27 RX ORDER — ATORVASTATIN CALCIUM 40 MG/1
40 TABLET, FILM COATED ORAL DAILY
Status: ON HOLD | COMMUNITY
End: 2023-10-31 | Stop reason: SDUPTHER

## 2020-02-27 RX ORDER — IPRATROPIUM BROMIDE AND ALBUTEROL SULFATE 2.5; .5 MG/3ML; MG/3ML
9 SOLUTION RESPIRATORY (INHALATION)
Status: CANCELLED | OUTPATIENT
Start: 2020-02-27

## 2020-02-27 RX ORDER — BUDESONIDE AND FORMOTEROL FUMARATE DIHYDRATE 160; 4.5 UG/1; UG/1
2 AEROSOL RESPIRATORY (INHALATION) EVERY 12 HOURS
Status: ON HOLD | COMMUNITY
End: 2020-02-29 | Stop reason: HOSPADM

## 2020-02-27 RX ORDER — WARFARIN SODIUM 5 MG/1
5 TABLET ORAL DAILY
Status: DISCONTINUED | OUTPATIENT
Start: 2020-02-27 | End: 2020-02-29 | Stop reason: HOSPADM

## 2020-02-27 RX ORDER — WARFARIN 1 MG/1
1 TABLET ORAL DAILY
Status: DISCONTINUED | OUTPATIENT
Start: 2020-02-27 | End: 2020-02-27

## 2020-02-27 RX ORDER — SODIUM CHLORIDE 0.9 % (FLUSH) 0.9 %
10 SYRINGE (ML) INJECTION
Status: DISCONTINUED | OUTPATIENT
Start: 2020-02-27 | End: 2020-02-29 | Stop reason: HOSPADM

## 2020-02-27 RX ORDER — METFORMIN HYDROCHLORIDE 1000 MG/1
1000 TABLET ORAL 2 TIMES DAILY WITH MEALS
COMMUNITY
End: 2023-08-23

## 2020-02-27 RX ORDER — IBUPROFEN 200 MG
16 TABLET ORAL
Status: DISCONTINUED | OUTPATIENT
Start: 2020-02-27 | End: 2020-02-29 | Stop reason: HOSPADM

## 2020-02-27 RX ORDER — INSULIN ASPART 100 [IU]/ML
1-10 INJECTION, SOLUTION INTRAVENOUS; SUBCUTANEOUS
Status: DISCONTINUED | OUTPATIENT
Start: 2020-02-27 | End: 2020-02-29 | Stop reason: HOSPADM

## 2020-02-27 RX ORDER — IPRATROPIUM BROMIDE AND ALBUTEROL SULFATE 2.5; .5 MG/3ML; MG/3ML
3 SOLUTION RESPIRATORY (INHALATION)
Status: COMPLETED | OUTPATIENT
Start: 2020-02-27 | End: 2020-02-27

## 2020-02-27 RX ORDER — GLUCAGON 1 MG
1 KIT INJECTION
Status: DISCONTINUED | OUTPATIENT
Start: 2020-02-27 | End: 2020-02-29 | Stop reason: HOSPADM

## 2020-02-27 RX ORDER — ENOXAPARIN SODIUM 150 MG/ML
1 INJECTION SUBCUTANEOUS
Status: DISCONTINUED | OUTPATIENT
Start: 2020-02-27 | End: 2020-02-29 | Stop reason: HOSPADM

## 2020-02-27 RX ADMIN — FLUTICASONE FUROATE AND VILANTEROL TRIFENATATE 1 PUFF: 100; 25 POWDER RESPIRATORY (INHALATION) at 08:02

## 2020-02-27 RX ADMIN — ATORVASTATIN CALCIUM 40 MG: 20 TABLET, FILM COATED ORAL at 03:02

## 2020-02-27 RX ADMIN — IPRATROPIUM BROMIDE AND ALBUTEROL SULFATE 3 ML: .5; 3 SOLUTION RESPIRATORY (INHALATION) at 11:02

## 2020-02-27 RX ADMIN — CARVEDILOL 6.25 MG: 6.25 TABLET, FILM COATED ORAL at 05:02

## 2020-02-27 RX ADMIN — ENOXAPARIN SODIUM 135 MG: 150 INJECTION SUBCUTANEOUS at 05:02

## 2020-02-27 RX ADMIN — IPRATROPIUM BROMIDE AND ALBUTEROL SULFATE 3 ML: .5; 3 SOLUTION RESPIRATORY (INHALATION) at 03:02

## 2020-02-27 RX ADMIN — IPRATROPIUM BROMIDE AND ALBUTEROL SULFATE 3 ML: .5; 3 SOLUTION RESPIRATORY (INHALATION) at 08:02

## 2020-02-27 RX ADMIN — IPRATROPIUM BROMIDE AND ALBUTEROL SULFATE 3 ML: .5; 3 SOLUTION RESPIRATORY (INHALATION) at 04:02

## 2020-02-27 RX ADMIN — IPRATROPIUM BROMIDE AND ALBUTEROL SULFATE 3 ML: .5; 3 SOLUTION RESPIRATORY (INHALATION) at 07:02

## 2020-02-27 RX ADMIN — WARFARIN SODIUM 5 MG: 5 TABLET ORAL at 05:02

## 2020-02-27 NOTE — ED PROVIDER NOTES
Encounter Date: 2/27/2020    SCRIBE #1 NOTE: I, Malu Palmer, am scribing for, and in the presence of, Dr. Mcallister.       History     Chief Complaint   Patient presents with    Shortness of Breath     x1 day with chest and back tightness. Hx CHF and COPD     Time seen by provider: 3:31 AM    This is a 49 y.o. female who presents with complaint of dyspnea that began yesterday. Patient reports dyspnea, chest pain, and mid back pain on exertion. She states she has dyspnea on exertion at a half block, and at times gets dyspneic inside her own home.  She doubts she can currently walk a few feet without getting dyspnea. Patient reports associated cough, congestion, and headache that began two days ago. She reports taking Dayquil with no relief. She denies leg pain or swelling, diaphoresis, nausea, or vomiting. She denies any sick contacts.  She states she received the flu vaccine this season.    The history is provided by the patient and medical records.     Review of patient's allergies indicates:  No Known Allergies  Past Medical History:   Diagnosis Date    Arthritis     Asthma     CHF (congestive heart failure)     Diabetes mellitus     Hypertension      Past Surgical History:   Procedure Laterality Date    CARDIAC SURGERY      TUBAL LIGATION       History reviewed. No pertinent family history.  Social History     Tobacco Use    Smoking status: Current Every Day Smoker    Smokeless tobacco: Never Used   Substance Use Topics    Alcohol use: No    Drug use: No     Review of Systems   ROS: As per HPI and below:   General: No fever. No diaphoresis.  HENT: No facial pain. Positive for rhinorrhea and nasal congestion.  Eyes: Negative for eye pain.   Cardiovascular: Positive for chest pain.   Respiratory:  Positive for dyspnea. Positive for cough.  GI: No abdominal pain. No nausea. No vomiting. No diarrhea.   Skin: No rashes.   Neuro:  No syncope.  No focal deficits. Positive for headache.   Musculoskeletal: No  "extremity pain. No leg swelling or pain. Positive for back pain.  All other systems reviewed and are negative.    Physical Exam     Initial Vitals [02/27/20 0311]   BP Pulse Resp Temp SpO2   107/62 101 (!) 24 98.1 °F (36.7 °C) 100 %      MAP       --         Physical Exam   Nursing note and vitals reviewed.  BP (!) 119/58   Pulse 92   Temp 98.1 °F (36.7 °C) (Oral)   Resp (!) 24   Ht 5' 8" (1.727 m)   Wt 131.5 kg (290 lb)   LMP 02/18/2020   SpO2 95%   BMI 44.09 kg/m²   Constitutional: AAOx3. Respiratory distress. Sitting up right. Does not tolerate recumbency.   Eyes: EOMI. No discharge. Anicteric.  HENT:   Mouth/Throat: Oropharynx is clear. Uvula midline. Mucus membranes dry.  Neck: Normal range of motion. Neck supple.  Cardiovascular: Mildly tachycardic. No murmur, no gallop and no friction rub heard.   Pulmonary/Chest:  Respiratory distress. Tachypneic. Poor air entry. Diffuse wheezes. No rhonci. No rales.  Abdominal: Bowel sounds normal. Soft. No distension and no mass. There is no tenderness. There is no rebound, no guarding, no tenderness at McBurney's point.  Musculoskeletal: Normal range of motion. No lower extremity edema.   Neurological: GCS 15. Alert and oriented to person, place, and time. No gross cranial nerve, light touch or strength deficit. Coordination normal.   Skin: Skin is warm and dry.   EXT: 2+ radial pulses.   Psychiatric: Behavior is normal. Judgment normal.      ED Course   Procedures  Labs Reviewed   CBC W/ AUTO DIFFERENTIAL - Abnormal; Notable for the following components:       Result Value    RBC 3.69 (*)     Hemoglobin 8.6 (*)     Hematocrit 32.2 (*)     Mean Corpuscular Hemoglobin 23.3 (*)     Mean Corpuscular Hemoglobin Conc 26.7 (*)     RDW 17.1 (*)     Lymph # 0.9 (*)     Gran% 78.4 (*)     Lymph% 11.2 (*)     All other components within normal limits   COMPREHENSIVE METABOLIC PANEL - Abnormal; Notable for the following components:    Glucose 111 (*)     Calcium 8.5 (*)     " Albumin 3.2 (*)     Anion Gap 7 (*)     All other components within normal limits   CULTURE, BLOOD   CULTURE, BLOOD   TROPONIN I   LACTIC ACID, PLASMA   B-TYPE NATRIURETIC PEPTIDE   POCT INFLUENZA A/B MOLECULAR   POCT URINE PREGNANCY          Imaging Results          X-Ray Chest AP Portable (Final result)  Result time 02/27/20 04:00:00    Final result by Cecilia Galindo MD (02/27/20 04:00:00)                 Impression:      No acute intrathoracic abnormality or significant interval detrimental change in cardiopulmonary status identified on this single radiographic view of the chest.      Electronically signed by: Cecilia Galindo MD  Date:    02/27/2020  Time:    04:00             Narrative:    EXAMINATION:  XR CHEST AP PORTABLE    CLINICAL HISTORY:  dyspnea;    TECHNIQUE:  Single frontal view of the chest was performed.    COMPARISON:  02/15/2020    FINDINGS:  Cardiac monitoring leads overlie the chest.  There is stable postoperative change of the thorax with sternotomy wires, mediastinal clips and prosthetic heart valve appearing grossly similar to prior exam.  The lungs are symmetrically expanded with no evidence of focal consolidation, significant volume of pleural fluid or pneumothorax.  The visualized osseous structures are intact.                                 Medical Decision Making:   History:   Old Medical Records: I decided to obtain old medical records.  Independently Interpreted Test(s):   I have ordered and independently interpreted X-rays - see prior notes.  Clinical Tests:   Lab Tests: Ordered and Reviewed  Radiological Study: Reviewed and Ordered            Scribe Attestation:   Scribe #1: I performed the above scribed service and the documentation accurately describes the services I performed. I attest to the accuracy of the note.    Attending Attestation:           Physician Attestation for Scribe:  Physician Attestation Statement for Scribe #1: I, Dr. Mcallister, reviewed documentation, as scribed by  Malu Palmer in my presence, and it is both accurate and complete.                 ED Course as of Feb 27 0521   Thu Feb 27, 2020   0456 Patient is a 49-year-old female with morbid obesity, hypertension, diabetes, COPD who presents with 2 days cough, worsening shortness of breath, and vague chest discomfort radiating to her back.  No associated fevers.  On exam patient with respiratory distress, poor air entry, diffuse loud wheezes, no lower extremity edema.  The initial differential includes CHF, COPD, acute coronary syndrome, DKA, dehydration, gross metabolic abnormality, sepsis, pneumonia.   I independently reviewed and interpreted CXR which shows no pneumothorax, no focal consolidation, stable cardiomegaly, no acute process.  Given the degree of the patient's symptoms, I attempted to start BiPAP.  Patient did not tolerate this at all.  She had significant clinical improvement with duonebs.     [RC]   2296 I independently reviewed and interpreted labs which are notable for normal troponin, chronic anemia with hemoglobin 8.6, and normal BNP.   Paged hospitalist, no immediate answer.     [RC]   2415 I have discussed the patient history, exam, findings with NP Nita, who accepts the patient for Dr. Mcguire    =====================================  Critical Care:  35 minutes total critical care time was personally spent by me, exclusive of procedures and separately billable time.   Critical care was necessary to treat or prevent imminent or life-threatening deterioration of the following conditions:  Respiratory failure   =====================================        [RC]      ED Course User Index  [RC] Irving Mcallister MD                Clinical Impression:     1. COPD with acute exacerbation                ED Disposition Condition    Observation                           Irving Mcallister MD  02/27/20 0541

## 2020-02-27 NOTE — HPI
Delaney Scott is a 49 year old woman with medical history of moderate persistent asthma, s/p aortic valve replacement on warfarin, hypertension, type 2 diabetes mellitus and morbid obesity.  The patient presented to the [facility] ED with complaint of shortness of breath with wheezing over the past two weeks. She reports she started with nasal congestion and has developed wheezing and non-productive cough.  She denies fever or chills and has not reported sick contacts. Initial evaluation in the ED, with stable oxygen saturation at 100% and chest x-ray without focal consolidation or effusion.  She was treated with duo-nebulizer aerosol treatments.  She will be admitted under Observation for management of acute exacerbation of asthma.      Per medical record review, the patient is s/p mechanical mitral valve replacement in 2015 ([place] valve.)  She is on warfarin for anticoagulation.  INR found to be subtherapeutic 1.6 on admission with ideal range 2.5-3.5.  She has recently had recent dosage adjustments.  She will be placed on lovenox bridge (1 mg/kg q12 hours)  during observation stay.

## 2020-02-27 NOTE — ASSESSMENT & PLAN NOTE
- s/p mechanical mitral valve replacement 2015 and follows Cardiology at [facility]]  - Per medical record, has had recent dosage adjustment due to sub-therapeutic levels (goal INR 2.5-3.5)  - Continue warfarin 5 mg daily and bridge with lovenox 1 mg/kg every 12 hours for now  - Monitor for signs of bleeding and monitor INR daily

## 2020-02-27 NOTE — NURSING
Pt arrived to floor via w/c with ER tech, and transferred to bed per self. Patient complaints of SOB, spo2 99% on room air, O2 2L/NC applied, pt feels better, VSS and afebrile. Patient oriented to room, call light placed within reach, bed low and locked, and daughter at bedside. No acute distress noted at this time. Will continue to monitor.

## 2020-02-27 NOTE — ASSESSMENT & PLAN NOTE
- Continue to provide support for healthy dietary choices and increasing physical activity  - Nutrition consult

## 2020-02-27 NOTE — ED NOTES
Attempt to call report again, pt is not assigned a nurse yet, they will have nurse return call for report

## 2020-02-27 NOTE — H&P
[facility]  Hospital Medicine  History & Physical    Patient Name: Delaney Scott  MRN: [id number]  Admission Date: 2/27/2020  Attending Physician: Abby Hill MD   Primary Care Provider: [facility]         Patient information was obtained from patient, past medical records and ER records.     Subjective:     Principal Problem:Moderate persistent asthma with acute exacerbation    Chief Complaint:   Chief Complaint   Patient presents with    Shortness of Breath     x1 day with chest and back tightness. Hx CHF and COPD        HPI: Delaney Scott is a 49 year old woman with medical history of moderate persistent asthma, s/p aortic valve replacement on warfarin, hypertension, type 2 diabetes mellitus and morbid obesity.  The patient presented to the [facility] ED with complaint of shortness of breath with wheezing over the past two weeks. She reports she started with nasal congestion and has developed wheezing and non-productive cough.  She denies fever or chills and has not reported sick contacts. Initial evaluation in the ED, with stable oxygen saturation at 100% and chest x-ray without focal consolidation or effusion.  She was treated with duo-nebulizer aerosol treatments.  She will be admitted under Observation for management of acute exacerbation of asthma.      Per medical record review, the patient is s/p mechanical mitral valve replacement in 2015 ([place] valve.)  She is on warfarin for anticoagulation.  INR found to be subtherapeutic 1.6 on admission with ideal range 2.5-3.5.  She has recently had recent dosage adjustments.  She will be placed on lovenox bridge (1 mg/kg q12 hours)  during observation stay.      Past Medical History:   Diagnosis Date    Anticoagulant long-term use     Arthritis     Asthma     Diabetes mellitus     Hypertension        Past Surgical History:   Procedure Laterality Date    CARDIAC SURGERY      s/p aortica valve  replacement      TUBAL LIGATION         Review of patient's allergies indicates:  No Known Allergies    No current facility-administered medications on file prior to encounter.      Current Outpatient Medications on File Prior to Encounter   Medication Sig    albuterol (PROVENTIL/VENTOLIN HFA) 90 mcg/actuation inhaler Inhale 1-2 puffs into the lungs every 6 (six) hours as needed for Wheezing or Shortness of Breath. Rescue    atorvastatin (LIPITOR) 40 MG tablet Take 40 mg by mouth once daily.    budesonide-formoterol 160-4.5 mcg (SYMBICORT) 160-4.5 mcg/actuation HFAA Inhale 2 puffs into the lungs every 12 (twelve) hours. Controller    carvediloL (COREG) 6.25 MG tablet Take 6.25 mg by mouth 2 (two) times daily with meals.    cyclobenzaprine (FLEXERIL) 10 MG tablet Take 10 mg by mouth 3 (three) times daily.    fluticasone-salmeterol 100-50 mcg/dose (ADVAIR) 100-50 mcg/dose diskus inhaler Inhale 1 puff into the lungs 2 (two) times daily.    furosemide (LASIX) 20 MG tablet Take 1 tablet (20 mg total) by mouth once daily. (Patient taking differently: Take 20 mg by mouth once daily. As needed for leg swelling)    metFORMIN (GLUCOPHAGE) 1000 MG tablet Take 1,000 mg by mouth 2 (two) times daily with meals.    warfarin (COUMADIN) 1 MG tablet Take by mouth once daily.    [DISCONTINUED] metformin (FORTAMET) 1,000 mg 24 hr tablet Take 1,000 mg by mouth 2 (two) times daily with meals.     [DISCONTINUED] HYDROcodone-acetaminophen (NORCO) 5-325 mg per tablet Take 1 tablet by mouth every 6 (six) hours as needed for Pain.     Family History     None        Tobacco Use    Smoking status: Current Every Day Smoker    Smokeless tobacco: Never Used   Substance and Sexual Activity    Alcohol use: No    Drug use: No    Sexual activity: Not on file     Review of Systems   Constitutional: Negative for chills and fever.   HENT: Positive for congestion and sinus pressure. Negative for trouble swallowing.    Eyes: Negative for  photophobia and visual disturbance.   Respiratory: Positive for cough, chest tightness and wheezing. Negative for shortness of breath.    Cardiovascular: Negative for chest pain and palpitations.   Gastrointestinal: Negative for abdominal pain, diarrhea, nausea and vomiting.   Endocrine: Negative.    Genitourinary: Negative for decreased urine volume and dysuria.   Musculoskeletal: Negative.    Skin: Negative.    Neurological: Negative for seizures and weakness.   Psychiatric/Behavioral: Negative.      Objective:     Vital Signs (Most Recent):  Temp: 99.2 °F (37.3 °C) (02/27/20 1526)  Pulse: 92 (02/27/20 1612)  Resp: 18 (02/27/20 1612)  BP: 126/69 (02/27/20 1526)  SpO2: (!) 94 % (02/27/20 1612) Vital Signs (24h Range):  Temp:  [97.5 °F (36.4 °C)-99.2 °F (37.3 °C)] 99.2 °F (37.3 °C)  Pulse:  [] 92  Resp:  [16-32] 18  SpO2:  [94 %-100 %] 94 %  BP: (107-140)/(57-82) 126/69     Weight: 128.7 kg (283 lb 11.7 oz)  Body mass index is 43.14 kg/m².    Physical Exam   Constitutional: She is oriented to person, place, and time. She appears well-developed and well-nourished. No distress.   HENT:   Head: Normocephalic and atraumatic.   Mouth/Throat: Oropharynx is clear and moist.   Eyes: Pupils are equal, round, and reactive to light. Conjunctivae, EOM and lids are normal.   Neck: Normal range of motion. Neck supple.   Cardiovascular: Regular rhythm and intact distal pulses. Tachycardia present.   Murmur heard.   Systolic murmur is present.  Pulses:       Radial pulses are 2+ on the right side, and 2+ on the left side.        Dorsalis pedis pulses are 2+ on the right side, and 2+ on the left side.   Pulmonary/Chest: Effort normal. She has decreased breath sounds in the right lower field, the left middle field and the left lower field. She has wheezes (expiratory) in the right upper field and the left upper field.   Abdominal: Soft. Normal appearance and bowel sounds are normal. There is no tenderness.   Lymphadenopathy:      She has no cervical adenopathy.   Neurological: She is alert and oriented to person, place, and time. She has normal strength. No sensory deficit.   Skin: Skin is warm, dry and intact.   Psychiatric: She has a normal mood and affect. Her speech is normal and behavior is normal.   Nursing note and vitals reviewed.        CRANIAL NERVES     CN III, IV, VI   Pupils are equal, round, and reactive to light.  Extraocular motions are normal.        Significant Labs:   CBC:   Recent Labs   Lab 02/27/20 0415   WBC 8.01   HGB 8.6*   HCT 32.2*        CMP:   Recent Labs   Lab 02/27/20 0415      K 4.0      CO2 26   *   BUN 11   CREATININE 0.9   CALCIUM 8.5*   PROT 6.9   ALBUMIN 3.2*   BILITOT 0.4   ALKPHOS 86   AST 16   ALT 14   ANIONGAP 7*   EGFRNONAA >60     Troponin:   Recent Labs   Lab 02/27/20 0415   TROPONINI <0.006     All pertinent labs within the past 24 hours have been reviewed.    Significant Imaging: I have reviewed all pertinent imaging results/findings within the past 24 hours.     Imaging Results          X-Ray Chest AP Portable (Final result)  Result time 02/27/20 04:00:00    Final result by Cecilia Galindo MD (02/27/20 04:00:00)                 Impression:      No acute intrathoracic abnormality or significant interval detrimental change in cardiopulmonary status identified on this single radiographic view of the chest.      Electronically signed by: Cecilia Galindo MD  Date:    02/27/2020  Time:    04:00             Narrative:    EXAMINATION:  XR CHEST AP PORTABLE    CLINICAL HISTORY:  dyspnea;    TECHNIQUE:  Single frontal view of the chest was performed.    COMPARISON:  02/15/2020    FINDINGS:  Cardiac monitoring leads overlie the chest.  There is stable postoperative change of the thorax with sternotomy wires, mediastinal clips and prosthetic heart valve appearing grossly similar to prior exam.  The lungs are symmetrically expanded with no evidence of focal consolidation,  significant volume of pleural fluid or pneumothorax.  The visualized osseous structures are intact.                                  Assessment/Plan:     * Moderate persistent asthma with acute exacerbation  - Oxygen saturation 94% room air.     - Prednisone 40mg PO daily to complete a 5 day course  - Continue fluticasone-vilaterol inhaler 1 puff daily  - Duonebs q4h with peak flow  and prn with IS  - Guaifenesin prn cough          Continuous tobacco abuse  Patient was counseled on smoknig cessation for 6 minutes.  Nicotine replacement offered.  Patient declined and not currently intersted in quiting smoking at this time.      Chronic anticoagulation  - s/p mechanical mitral valve replacement 2015 and follows [facility] at [facility]]  - Per medical record, has had recent dosage adjustment due to sub-therapeutic levels (goal INR 2.5-3.5)  - Continue warfarin 5 mg daily and bridge with lovenox 1 mg/kg every 12 hours for now  - Monitor for signs of bleeding and monitor INR daily      Morbid obesity  - Continue to provide support for healthy dietary choices and increasing physical activity  - Nutrition consult       Type 2 diabetes mellitus, without long-term current use of insulin  - hemoglobin A1c 5.8   - Home regimen: metformin 1000 mg twice daily  - Sliding scale insulin  - Diabetic diet      S/P MVR (mitral valve replacement)  - Per medical record, severe rheumatic heart disease and s/p MVR 2015 and on chronic anticoagulation with warfarin  - Lovenox bridge as warfarin subtherapeutic  - warfarin goal 2.5-3.5      Essential hypertension  - Appears stable on home dose of carvedilol 6.25 mg twice daily  - Continue to monitor        VTE Risk Mitigation (From admission, onward)         Ordered     enoxaparin injection 135 mg  Every 12 hours (non-standard times)      02/27/20 1706     warfarin (COUMADIN) tablet 5 mg  Daily      02/27/20 1658     Place SAYRA hose  Until discontinued      02/27/20 0625     IP VTE HIGH RISK PATIENT   Once      02/27/20 0625                   Galilea Evans NP  Department of Hospital Medicine   [facility]

## 2020-02-27 NOTE — SUBJECTIVE & OBJECTIVE
Past Medical History:   Diagnosis Date    Anticoagulant long-term use     Arthritis     Asthma     Diabetes mellitus     Hypertension        Past Surgical History:   Procedure Laterality Date    CARDIAC SURGERY      s/p aortica valve replacement      TUBAL LIGATION         Review of patient's allergies indicates:  No Known Allergies    No current facility-administered medications on file prior to encounter.      Current Outpatient Medications on File Prior to Encounter   Medication Sig    albuterol (PROVENTIL/VENTOLIN HFA) 90 mcg/actuation inhaler Inhale 1-2 puffs into the lungs every 6 (six) hours as needed for Wheezing or Shortness of Breath. Rescue    atorvastatin (LIPITOR) 40 MG tablet Take 40 mg by mouth once daily.    budesonide-formoterol 160-4.5 mcg (SYMBICORT) 160-4.5 mcg/actuation HFAA Inhale 2 puffs into the lungs every 12 (twelve) hours. Controller    carvediloL (COREG) 6.25 MG tablet Take 6.25 mg by mouth 2 (two) times daily with meals.    cyclobenzaprine (FLEXERIL) 10 MG tablet Take 10 mg by mouth 3 (three) times daily.    fluticasone-salmeterol 100-50 mcg/dose (ADVAIR) 100-50 mcg/dose diskus inhaler Inhale 1 puff into the lungs 2 (two) times daily.    furosemide (LASIX) 20 MG tablet Take 1 tablet (20 mg total) by mouth once daily. (Patient taking differently: Take 20 mg by mouth once daily. As needed for leg swelling)    metFORMIN (GLUCOPHAGE) 1000 MG tablet Take 1,000 mg by mouth 2 (two) times daily with meals.    warfarin (COUMADIN) 1 MG tablet Take by mouth once daily.    [DISCONTINUED] metformin (FORTAMET) 1,000 mg 24 hr tablet Take 1,000 mg by mouth 2 (two) times daily with meals.     [DISCONTINUED] HYDROcodone-acetaminophen (NORCO) 5-325 mg per tablet Take 1 tablet by mouth every 6 (six) hours as needed for Pain.     Family History     None        Tobacco Use    Smoking status: Current Every Day Smoker    Smokeless tobacco: Never Used   Substance and Sexual Activity     Alcohol use: No    Drug use: No    Sexual activity: Not on file     Review of Systems   Constitutional: Negative for chills and fever.   HENT: Positive for congestion and sinus pressure. Negative for trouble swallowing.    Eyes: Negative for photophobia and visual disturbance.   Respiratory: Positive for cough, chest tightness and wheezing. Negative for shortness of breath.    Cardiovascular: Negative for chest pain and palpitations.   Gastrointestinal: Negative for abdominal pain, diarrhea, nausea and vomiting.   Endocrine: Negative.    Genitourinary: Negative for decreased urine volume and dysuria.   Musculoskeletal: Negative.    Skin: Negative.    Neurological: Negative for seizures and weakness.   Psychiatric/Behavioral: Negative.      Objective:     Vital Signs (Most Recent):  Temp: 99.2 °F (37.3 °C) (02/27/20 1526)  Pulse: 92 (02/27/20 1612)  Resp: 18 (02/27/20 1612)  BP: 126/69 (02/27/20 1526)  SpO2: (!) 94 % (02/27/20 1612) Vital Signs (24h Range):  Temp:  [97.5 °F (36.4 °C)-99.2 °F (37.3 °C)] 99.2 °F (37.3 °C)  Pulse:  [] 92  Resp:  [16-32] 18  SpO2:  [94 %-100 %] 94 %  BP: (107-140)/(57-82) 126/69     Weight: 128.7 kg (283 lb 11.7 oz)  Body mass index is 43.14 kg/m².    Physical Exam   Constitutional: She is oriented to person, place, and time. She appears well-developed and well-nourished. No distress.   HENT:   Head: Normocephalic and atraumatic.   Mouth/Throat: Oropharynx is clear and moist.   Eyes: Pupils are equal, round, and reactive to light. Conjunctivae, EOM and lids are normal.   Neck: Normal range of motion. Neck supple.   Cardiovascular: Regular rhythm and intact distal pulses. Tachycardia present.   Murmur heard.   Systolic murmur is present.  Pulses:       Radial pulses are 2+ on the right side, and 2+ on the left side.        Dorsalis pedis pulses are 2+ on the right side, and 2+ on the left side.   Pulmonary/Chest: Effort normal. She has decreased breath sounds in the right lower  field, the left middle field and the left lower field. She has wheezes (expiratory) in the right upper field and the left upper field.   Abdominal: Soft. Normal appearance and bowel sounds are normal. There is no tenderness.   Lymphadenopathy:     She has no cervical adenopathy.   Neurological: She is alert and oriented to person, place, and time. She has normal strength. No sensory deficit.   Skin: Skin is warm, dry and intact.   Psychiatric: She has a normal mood and affect. Her speech is normal and behavior is normal.   Nursing note and vitals reviewed.        CRANIAL NERVES     CN III, IV, VI   Pupils are equal, round, and reactive to light.  Extraocular motions are normal.        Significant Labs:   CBC:   Recent Labs   Lab 02/27/20 0415   WBC 8.01   HGB 8.6*   HCT 32.2*        CMP:   Recent Labs   Lab 02/27/20 0415      K 4.0      CO2 26   *   BUN 11   CREATININE 0.9   CALCIUM 8.5*   PROT 6.9   ALBUMIN 3.2*   BILITOT 0.4   ALKPHOS 86   AST 16   ALT 14   ANIONGAP 7*   EGFRNONAA >60     Troponin:   Recent Labs   Lab 02/27/20 0415   TROPONINI <0.006     All pertinent labs within the past 24 hours have been reviewed.    Significant Imaging: I have reviewed all pertinent imaging results/findings within the past 24 hours.     Imaging Results          X-Ray Chest AP Portable (Final result)  Result time 02/27/20 04:00:00    Final result by Cecilia Galindo MD (02/27/20 04:00:00)                 Impression:      No acute intrathoracic abnormality or significant interval detrimental change in cardiopulmonary status identified on this single radiographic view of the chest.      Electronically signed by: Cecilia Galindo MD  Date:    02/27/2020  Time:    04:00             Narrative:    EXAMINATION:  XR CHEST AP PORTABLE    CLINICAL HISTORY:  dyspnea;    TECHNIQUE:  Single frontal view of the chest was performed.    COMPARISON:  02/15/2020    FINDINGS:  Cardiac monitoring leads overlie the chest.   There is stable postoperative change of the thorax with sternotomy wires, mediastinal clips and prosthetic heart valve appearing grossly similar to prior exam.  The lungs are symmetrically expanded with no evidence of focal consolidation, significant volume of pleural fluid or pneumothorax.  The visualized osseous structures are intact.

## 2020-02-27 NOTE — ASSESSMENT & PLAN NOTE
- Per medical record, severe rheumatic heart disease and s/p MVR 2015 and on chronic anticoagulation with warfarin  - Lovenox bridge as warfarin subtherapeutic  - warfarin goal 2.5-3.5

## 2020-02-27 NOTE — ED TRIAGE NOTES
"Pt c/o SOB and chest/back tightness worse after coughing, pt states "its worse because lili got this cold"  "

## 2020-02-27 NOTE — PLAN OF CARE
Resting comfortably in bed at this time.  VSS on RA - O2/2L(>88%) and afebrile this shift.  Tele monitored s change / event.    Improved appetite and good UOP this shift, independent to BSC/ now c BRP.  Repositions self independently in bed.     Work of breathing has decreased throughout this shift - BS remain audible s stethoscope.  Coughing encouraged and Rsp Tx provided Q4    Free from injury or skin breakdown; Fall precautions maintained and call light in reach.  POC updated questions answered and comments acknowledged.  Purposeful hourly rounding completed this shift.    Refusal of CPAP/BiPap   Refusal of TEDs and SCDs    Provided IS      Supplemental Lovenox administered for INR below Therapeutic level.

## 2020-02-27 NOTE — ASSESSMENT & PLAN NOTE
- Oxygen saturation 94% room air.     - Prednisone 40mg PO daily to complete a 5 day course  - Continue fluticasone-vilaterol inhaler 1 puff daily  - Duonebs q4h with peak flow  and prn with IS  - Guaifenesin prn cough

## 2020-02-27 NOTE — ASSESSMENT & PLAN NOTE
- hemoglobin A1c 5.8   - Home regimen: metformin 1000 mg twice daily  - Sliding scale insulin  - Diabetic diet

## 2020-02-27 NOTE — ED NOTES
EMERGENCY DEPARTMENT HISTORY AND PHYSICAL EXAM    1:17 PM      Date: 2/13/2018  Patient Name: Neel Cardoza    History of Presenting Illness     Chief Complaint   Patient presents with    Exposure to STD         History Provided By: Patient    Chief Complaint: Exposure to STD  Duration: 1 Days  Location: Penis  Severity: 0 out of 10  Associated Symptoms: Patient denies any associated symptoms or complaints. Additional History (Context): Neel Cardoza is a 56 y.o. male with a history of paroxysmal atrial fibrillation, cardiomyopathy, HTN, and tobacco abuse who presents with c/o STD exposure onset 1 day ago. He notes that his wife was diagnosed yesterday with an STD and would like to have a check up himself for a possibility of an STD due to exposure to his wife. As the patient is without physical symptoms or complaints of pain, there is no severity of pain, quality of pain, duration, modifying factors, or associated signs and symptoms regarding the ptatient's presenting complaint. Patient denies any other associated symptoms or complaints. PCP: Rosalinda Ramos NP    Current Facility-Administered Medications   Medication Dose Route Frequency Provider Last Rate Last Dose    azithromycin (ZITHROMAX) tablet 1,000 mg  1,000 mg Oral DAILY Bryant, PA   1,000 mg at 02/13/18 1337     Current Outpatient Prescriptions   Medication Sig Dispense Refill    metroNIDAZOLE (FLAGYL) 500 mg tablet Take 4 Tabs by mouth now for 1 dose. Take all 4 pills one time. 4 Tab 0       Past History     Past Medical History:  Past Medical History:   Diagnosis Date    Cardiomyopathy ([facility] [address]     HTN (hypertension)     PAF (paroxysmal atrial fibrillation) ([facility])     S/P Cardioversion by EMS (08/16)    Tobacco abuse        Past Surgical History:  History reviewed. No pertinent surgical history.     Family History:  Family History   Problem Relation Age of Onset    Hypertension Mother     Alcohol abuse Father     No Known Respiratory at bedside after failed attempt with bipap, pt could not tolerate the mask   Problems Sister     Glaucoma Brother     Alzheimer Maternal Grandmother     Kidney Disease Brother     Alcohol abuse Brother        Social History:  Social History   Substance Use Topics    Smoking status: Former Smoker     Packs/day: 1.00    Smokeless tobacco: Former User     Quit date: 9/25/2017    Alcohol use 0.6 oz/week     1 Cans of beer per week       Allergies:  No Known Allergies      Review of Systems       Review of Systems   Constitutional: Negative for activity change, fatigue and fever. HENT: Negative for congestion and rhinorrhea. Eyes: Negative for visual disturbance. Respiratory: Negative for shortness of breath. Cardiovascular: Negative for chest pain and palpitations. Gastrointestinal: Negative for abdominal pain, diarrhea, nausea and vomiting. Genitourinary: Negative for dysuria and hematuria. Musculoskeletal: Negative for back pain. Skin: Negative for rash. Neurological: Negative for dizziness, weakness and light-headedness. Psychiatric/Behavioral: Negative for agitation. All other systems reviewed and are negative. Physical Exam     Visit Vitals    BP (!) 130/99 (BP 1 Location: Right arm, BP Patient Position: At rest)    Pulse 84    Temp 98.2 °F (36.8 °C)    Resp 16    Ht 6' (1.829 m)    Wt 96.6 kg (213 lb)    SpO2 100%    BMI 28.89 kg/m2         Physical Exam   Constitutional: He is oriented to person, place, and time. He appears well-developed and well-nourished. No distress. HENT:   Head: Normocephalic and atraumatic. Mouth/Throat: Oropharynx is clear and moist.   Eyes: Conjunctivae and EOM are normal. Pupils are equal, round, and reactive to light. No scleral icterus. Neck: Normal range of motion. Neck supple. Cardiovascular: Normal rate, regular rhythm and normal heart sounds. No murmur heard. Pulmonary/Chest: Effort normal and breath sounds normal. No respiratory distress. Abdominal: Soft.  Bowel sounds are normal. He exhibits no distension. There is no tenderness. No abdominal tenderness to palpation on all four quadrants. Genitourinary:   Genitourinary Comments: No testicular pain. Musculoskeletal: He exhibits no edema. Lymphadenopathy:     He has no cervical adenopathy. Neurological: He is alert and oriented to person, place, and time. Coordination normal.   Skin: Skin is warm and dry. No rash noted. Psychiatric: He has a normal mood and affect. His behavior is normal.   Nursing note and vitals reviewed. Diagnostic Study Results     Labs -  No results found for this or any previous visit (from the past 12 hour(s)). Radiologic Studies -   No orders to display   No results found. Medical Decision Making   I am the first provider for this patient. I reviewed the vital signs, available nursing notes, past medical history, past surgical history, family history and social history. Vital Signs-Reviewed the patient's vital signs. Pulse Oximetry Analysis -  100% on room air (normal)    Records Reviewed: Nursing Notes (Time of Review: 1:17 PM)    ED Course: Progress Notes, Reevaluation, and Consults:    Provider Notes (Medical Decision Making): Patient's wife was recently diagnosed with STD. I will go ahead and treat with a dose of Rocephin 250 mg IM and 1000 mg of Azithromycin, and will send him home on 2000 mg of Flagyl 1 time dose. Diagnosis     Clinical Impression:   1. Exposure to STD        Disposition: HOME    Follow-up Information     Follow up With Details Comments [address], NP In 1 day      [facility] EMERGENCY DEPT  As needed [address] [place] [address]  [phone]           Patient's Medications   Start Taking    METRONIDAZOLE (FLAGYL) 500 MG TABLET    Take 4 Tabs by mouth now for 1 dose. Take all 4 pills one time.    Continue Taking    No medications on file   These Medications have changed    No medications on file   Stop Taking    No medications on file _______________________________    Attestations:  Scribe Attestation     Stacie Boswell acting as a scribe for and in the presence of Ninoska Morgan        February 13, 2018 at 1:17 PM       Provider Attestation:      I personally performed the services described in the documentation, reviewed the documentation, as recorded by the scribe in my presence, and it accurately and completely records my words and actions.  February 13, 2018 at 1:17 PM -   MARIE Morgan    _______________________________

## 2020-02-28 LAB
ALBUMIN SERPL BCP-MCNC: 3 G/DL (ref 3.5–5.2)
ALP SERPL-CCNC: 80 U/L (ref 55–135)
ALT SERPL W/O P-5'-P-CCNC: 13 U/L (ref 10–44)
ANION GAP SERPL CALC-SCNC: 6 MMOL/L (ref 8–16)
AST SERPL-CCNC: 16 U/L (ref 10–40)
BASOPHILS # BLD AUTO: 0.02 K/UL (ref 0–0.2)
BASOPHILS NFR BLD: 0.4 % (ref 0–1.9)
BILIRUB SERPL-MCNC: 0.3 MG/DL (ref 0.1–1)
BUN SERPL-MCNC: 11 MG/DL (ref 6–20)
CALCIUM SERPL-MCNC: 8.6 MG/DL (ref 8.7–10.5)
CHLORIDE SERPL-SCNC: 107 MMOL/L (ref 95–110)
CO2 SERPL-SCNC: 25 MMOL/L (ref 23–29)
CREAT SERPL-MCNC: 0.8 MG/DL (ref 0.5–1.4)
DIFFERENTIAL METHOD: ABNORMAL
EOSINOPHIL # BLD AUTO: 0.2 K/UL (ref 0–0.5)
EOSINOPHIL NFR BLD: 4.9 % (ref 0–8)
ERYTHROCYTE [DISTWIDTH] IN BLOOD BY AUTOMATED COUNT: 17.2 % (ref 11.5–14.5)
EST. GFR  (AFRICAN AMERICAN): >60 ML/MIN/1.73 M^2
EST. GFR  (NON AFRICAN AMERICAN): >60 ML/MIN/1.73 M^2
GLUCOSE SERPL-MCNC: 118 MG/DL (ref 70–110)
HCT VFR BLD AUTO: 30.3 % (ref 37–48.5)
HGB BLD-MCNC: 8.2 G/DL (ref 12–16)
IMM GRANULOCYTES # BLD AUTO: 0.01 K/UL (ref 0–0.04)
IMM GRANULOCYTES NFR BLD AUTO: 0.2 % (ref 0–0.5)
INR PPP: 1.5 (ref 0.8–1.2)
LYMPHOCYTES # BLD AUTO: 1.1 K/UL (ref 1–4.8)
LYMPHOCYTES NFR BLD: 22.1 % (ref 18–48)
MAGNESIUM SERPL-MCNC: 1.8 MG/DL (ref 1.6–2.6)
MCH RBC QN AUTO: 23.5 PG (ref 27–31)
MCHC RBC AUTO-ENTMCNC: 27.1 G/DL (ref 32–36)
MCV RBC AUTO: 87 FL (ref 82–98)
MONOCYTES # BLD AUTO: 0.5 K/UL (ref 0.3–1)
MONOCYTES NFR BLD: 9.7 % (ref 4–15)
NEUTROPHILS # BLD AUTO: 3 K/UL (ref 1.8–7.7)
NEUTROPHILS NFR BLD: 62.7 % (ref 38–73)
NRBC BLD-RTO: 0 /100 WBC
PHOSPHATE SERPL-MCNC: 3.3 MG/DL (ref 2.7–4.5)
PLATELET # BLD AUTO: 237 K/UL (ref 150–350)
PMV BLD AUTO: 10 FL (ref 9.2–12.9)
POCT GLUCOSE: 104 MG/DL (ref 70–110)
POCT GLUCOSE: 114 MG/DL (ref 70–110)
POCT GLUCOSE: 83 MG/DL (ref 70–110)
POTASSIUM SERPL-SCNC: 3.9 MMOL/L (ref 3.5–5.1)
PROT SERPL-MCNC: 6.6 G/DL (ref 6–8.4)
PROTHROMBIN TIME: 17 SEC (ref 9–12.5)
RBC # BLD AUTO: 3.49 M/UL (ref 4–5.4)
SODIUM SERPL-SCNC: 138 MMOL/L (ref 136–145)
WBC # BLD AUTO: 4.85 K/UL (ref 3.9–12.7)

## 2020-02-28 PROCEDURE — 83735 ASSAY OF MAGNESIUM: CPT

## 2020-02-28 PROCEDURE — 84100 ASSAY OF PHOSPHORUS: CPT

## 2020-02-28 PROCEDURE — 80053 COMPREHEN METABOLIC PANEL: CPT

## 2020-02-28 PROCEDURE — 25000003 PHARM REV CODE 250: Performed by: NURSE PRACTITIONER

## 2020-02-28 PROCEDURE — 94761 N-INVAS EAR/PLS OXIMETRY MLT: CPT

## 2020-02-28 PROCEDURE — 99900035 HC TECH TIME PER 15 MIN (STAT)

## 2020-02-28 PROCEDURE — 94799 UNLISTED PULMONARY SVC/PX: CPT

## 2020-02-28 PROCEDURE — G0378 HOSPITAL OBSERVATION PER HR: HCPCS

## 2020-02-28 PROCEDURE — 99226 PR SUBSEQUENT OBSERVATION CARE,LEVEL III: ICD-10-PCS | Mod: ,,, | Performed by: NURSE PRACTITIONER

## 2020-02-28 PROCEDURE — 97802 MEDICAL NUTRITION INDIV IN: CPT

## 2020-02-28 PROCEDURE — 94640 AIRWAY INHALATION TREATMENT: CPT

## 2020-02-28 PROCEDURE — 85025 COMPLETE CBC W/AUTO DIFF WBC: CPT

## 2020-02-28 PROCEDURE — 85610 PROTHROMBIN TIME: CPT

## 2020-02-28 PROCEDURE — 99226 PR SUBSEQUENT OBSERVATION CARE,LEVEL III: CPT | Mod: ,,, | Performed by: NURSE PRACTITIONER

## 2020-02-28 PROCEDURE — 96372 THER/PROPH/DIAG INJ SC/IM: CPT

## 2020-02-28 PROCEDURE — 63600175 PHARM REV CODE 636 W HCPCS: Performed by: NURSE PRACTITIONER

## 2020-02-28 PROCEDURE — 25000242 PHARM REV CODE 250 ALT 637 W/ HCPCS: Performed by: NURSE PRACTITIONER

## 2020-02-28 PROCEDURE — 36415 COLL VENOUS BLD VENIPUNCTURE: CPT

## 2020-02-28 RX ORDER — PREDNISONE 20 MG/1
40 TABLET ORAL DAILY
Status: DISCONTINUED | OUTPATIENT
Start: 2020-02-28 | End: 2020-02-29 | Stop reason: HOSPADM

## 2020-02-28 RX ORDER — GUAIFENESIN 100 MG/5ML
200 SOLUTION ORAL EVERY 4 HOURS PRN
Status: DISCONTINUED | OUTPATIENT
Start: 2020-02-28 | End: 2020-02-29 | Stop reason: HOSPADM

## 2020-02-28 RX ORDER — CETIRIZINE HYDROCHLORIDE 5 MG/1
5 TABLET ORAL ONCE
Status: COMPLETED | OUTPATIENT
Start: 2020-02-28 | End: 2020-02-28

## 2020-02-28 RX ADMIN — ATORVASTATIN CALCIUM 40 MG: 20 TABLET, FILM COATED ORAL at 09:02

## 2020-02-28 RX ADMIN — ENOXAPARIN SODIUM 135 MG: 150 INJECTION SUBCUTANEOUS at 05:02

## 2020-02-28 RX ADMIN — CARVEDILOL 6.25 MG: 6.25 TABLET, FILM COATED ORAL at 05:02

## 2020-02-28 RX ADMIN — GUAIFENESIN 200 MG: 100 SOLUTION ORAL at 01:02

## 2020-02-28 RX ADMIN — IPRATROPIUM BROMIDE AND ALBUTEROL SULFATE 3 ML: .5; 3 SOLUTION RESPIRATORY (INHALATION) at 07:02

## 2020-02-28 RX ADMIN — IPRATROPIUM BROMIDE AND ALBUTEROL SULFATE 3 ML: .5; 3 SOLUTION RESPIRATORY (INHALATION) at 03:02

## 2020-02-28 RX ADMIN — CETIRIZINE HYDROCHLORIDE 5 MG: 5 TABLET ORAL at 10:02

## 2020-02-28 RX ADMIN — PREDNISONE 40 MG: 20 TABLET ORAL at 11:02

## 2020-02-28 RX ADMIN — IPRATROPIUM BROMIDE AND ALBUTEROL SULFATE 3 ML: .5; 3 SOLUTION RESPIRATORY (INHALATION) at 08:02

## 2020-02-28 RX ADMIN — ALUMINUM HYDROXIDE, MAGNESIUM HYDROXIDE, AND SIMETHICONE: 200; 200; 20 SUSPENSION ORAL at 01:02

## 2020-02-28 RX ADMIN — FLUTICASONE FUROATE AND VILANTEROL TRIFENATATE 1 PUFF: 100; 25 POWDER RESPIRATORY (INHALATION) at 08:02

## 2020-02-28 RX ADMIN — IPRATROPIUM BROMIDE AND ALBUTEROL SULFATE 3 ML: .5; 3 SOLUTION RESPIRATORY (INHALATION) at 11:02

## 2020-02-28 RX ADMIN — ACETAMINOPHEN 650 MG: 325 TABLET ORAL at 09:02

## 2020-02-28 RX ADMIN — CARVEDILOL 6.25 MG: 6.25 TABLET, FILM COATED ORAL at 09:02

## 2020-02-28 RX ADMIN — WARFARIN SODIUM 5 MG: 5 TABLET ORAL at 05:02

## 2020-02-28 NOTE — PROGRESS NOTES
[facility]  Hospital Medicine  Progress Note    Patient Name: Delaney Scott  MRN: [id number]  Patient Class: OP- Observation   Admission Date: 2/27/2020  Length of Stay: 0 days  Attending Physician: Abby Hill MD  Primary Care Provider: [facility]        Subjective:     Principal Problem:Moderate persistent asthma with acute exacerbation        HPI:  Delaney Scott is a 49 year old woman with medical history of moderate persistent asthma, s/p aortic valve replacement on warfarin, hypertension, type 2 diabetes mellitus and morbid obesity.  The patient presented to the [facility] ED with complaint of shortness of breath with wheezing over the past two weeks. She reports she started with nasal congestion and has developed wheezing and non-productive cough.  She denies fever or chills and has not reported sick contacts. Initial evaluation in the ED, with stable oxygen saturation at 100% and chest x-ray without focal consolidation or effusion.  She was treated with duo-nebulizer aerosol treatments.  She will be admitted under Observation for management of acute exacerbation of asthma.      Per medical record review, the patient is s/p mechanical mitral valve replacement in 2015 ([place] valve.)  She is on warfarin for anticoagulation.  INR found to be subtherapeutic 1.6 on admission with ideal range 2.5-3.5.  She has recently had recent dosage adjustments.  She will be placed on lovenox bridge (1 mg/kg q12 hours)  during observation stay.        Interval History: No events overnight. Some gradual improvement to airflow. Add prednisone 40 mg daily and continue duo-nebulizer treatments.  INR 1.5 and continue lovenox bridge and warfarin 5 mg daily.      Review of Systems   Constitutional: Negative for chills and fever.   HENT: Positive for congestion and sinus pressure. Negative for trouble swallowing.    Eyes: Negative for photophobia and visual disturbance.    Respiratory: Positive for chest tightness. Negative for cough, shortness of breath and wheezing.    Cardiovascular: Negative for chest pain and palpitations.   Gastrointestinal: Negative for abdominal pain, diarrhea, nausea and vomiting.   Endocrine: Negative.    Genitourinary: Negative for decreased urine volume and dysuria.   Musculoskeletal: Negative.    Skin: Negative.    Neurological: Negative for seizures and weakness.   Psychiatric/Behavioral: Negative.      Objective:     Vital Signs (Most Recent):  Temp: 97.8 °F (36.6 °C) (02/28/20 1156)  Pulse: 84 (02/28/20 1200)  Resp: 16 (02/28/20 1156)  BP: 113/65 (02/28/20 1156)  SpO2: 97 % (02/28/20 1156) Vital Signs (24h Range):  Temp:  [97.4 °F (36.3 °C)-99.2 °F (37.3 °C)] 97.8 °F (36.6 °C)  Pulse:  [] 84  Resp:  [16-20] 16  SpO2:  [94 %-100 %] 97 %  BP: (113-126)/(52-69) 113/65     Weight: 128.7 kg (283 lb 11.7 oz)  Body mass index is 43.14 kg/m².    Intake/Output Summary (Last 24 hours) at 2/28/2020 1432  Last data filed at 2/28/2020 1200  Gross per 24 hour   Intake 1320 ml   Output --   Net 1320 ml      Physical Exam   Constitutional: She is oriented to person, place, and time. She appears well-developed and well-nourished. No distress.   HENT:   Head: Normocephalic and atraumatic.   Mouth/Throat: Oropharynx is clear and moist.   Eyes: Pupils are equal, round, and reactive to light. Conjunctivae, EOM and lids are normal.   Neck: Normal range of motion. Neck supple.   Cardiovascular: Regular rhythm and intact distal pulses. Tachycardia present.   Murmur heard.   Systolic murmur is present.  Pulses:       Radial pulses are 2+ on the right side, and 2+ on the left side.        Dorsalis pedis pulses are 2+ on the right side, and 2+ on the left side.   Pulmonary/Chest: Effort normal. She has decreased breath sounds in the right lower field and the left lower field. She has no wheezes.   Abdominal: Soft. Normal appearance and bowel sounds are normal. There is  no tenderness.   Lymphadenopathy:     She has no cervical adenopathy.   Neurological: She is alert and oriented to person, place, and time. She has normal strength. No sensory deficit.   Skin: Skin is warm, dry and intact.   Psychiatric: She has a normal mood and affect. Her speech is normal and behavior is normal.   Nursing note and vitals reviewed.      Significant Labs:   A1C:   Recent Labs   Lab 02/27/20  0703   HGBA1C 5.8*     CBC:   Recent Labs   Lab 02/27/20 0415 02/28/20  0526   WBC 8.01 4.85   HGB 8.6* 8.2*   HCT 32.2* 30.3*    237     CMP:   Recent Labs   Lab 02/27/20 0415 02/28/20 0526    138   K 4.0 3.9    107   CO2 26 25   * 118*   BUN 11 11   CREATININE 0.9 0.8   CALCIUM 8.5* 8.6*   PROT 6.9 6.6   ALBUMIN 3.2* 3.0*   BILITOT 0.4 0.3   ALKPHOS 86 80   AST 16 16   ALT 14 13   ANIONGAP 7* 6*   EGFRNONAA >60 >60     Coagulation:   Recent Labs   Lab 02/28/20 0526   INR 1.5*     All pertinent labs within the past 24 hours have been reviewed.    Significant Imaging: I have reviewed all pertinent imaging results/findings within the past 24 hours.      Assessment/Plan:      * Moderate persistent asthma with acute exacerbation  - Oxygen saturation 97% room air.     - Prednisone 40mg PO daily to complete a 5 day course  - Continue fluticasone-vilaterol inhaler 1 puff daily  - Duonebs q4h with peak flow  and prn with IS  - Gradual improvement to airflow to post treatament peak flow 160 >> 210 >> 260  - Add cetirizine 5 mg daily  - Guaifenesin prn cough          Continuous tobacco abuse  Patient was counseled on smoknig cessation for 6 minutes.  Nicotine replacement offered.  Patient declined and not currently intersted in quiting smoking at this time.      Chronic anticoagulation  - s/p mechanical mitral valve replacement 2015 and follows Cardiology at [facility]]  - Per medical record, has had recent dosage adjustment due to sub-therapeutic levels (goal INR 2.5-3.5)  - Continue warfarin 5  mg daily and bridge with lovenox 1 mg/kg every 12 hours for now  - Monitor for signs of bleeding and monitor INR daily      Morbid obesity  - Continue to provide support for healthy dietary choices and increasing physical activity  - Nutrition consult       Type 2 diabetes mellitus, without long-term current use of insulin  - hemoglobin A1c 5.8   - Home regimen: metformin 1000 mg twice daily  - Sliding scale insulin  - Diabetic diet      S/P MVR (mitral valve replacement)  - Per medical record, severe rheumatic heart disease and s/p MVR 2015 and on chronic anticoagulation with warfarin  - Lovenox bridge as warfarin subtherapeutic  - warfarin goal 2.5-3.5      Essential hypertension  - Appears stable on home dose of carvedilol 6.25 mg twice daily  - Continue to monitor      VTE Risk Mitigation (From admission, onward)         Ordered     enoxaparin injection 135 mg  Every 12 hours (non-standard times)      02/27/20 1706     warfarin (COUMADIN) tablet 5 mg  Daily      02/27/20 1658     Place SAYRA hose  Until discontinued      02/27/20 0625     IP VTE HIGH RISK PATIENT  Once      02/27/20 0625                      Galilea Evans NP  Department of Hospital Medicine   [facility]

## 2020-02-28 NOTE — SUBJECTIVE & OBJECTIVE
Interval History: No events overnight. Some gradual improvement to airflow. Add prednisone 40 mg daily and continue duo-nebulizer treatments.  INR 1.5 and continue lovenox bridge and warfarin 5 mg daily.      Review of Systems   Constitutional: Negative for chills and fever.   HENT: Positive for congestion and sinus pressure. Negative for trouble swallowing.    Eyes: Negative for photophobia and visual disturbance.   Respiratory: Positive for chest tightness. Negative for cough, shortness of breath and wheezing.    Cardiovascular: Negative for chest pain and palpitations.   Gastrointestinal: Negative for abdominal pain, diarrhea, nausea and vomiting.   Endocrine: Negative.    Genitourinary: Negative for decreased urine volume and dysuria.   Musculoskeletal: Negative.    Skin: Negative.    Neurological: Negative for seizures and weakness.   Psychiatric/Behavioral: Negative.      Objective:     Vital Signs (Most Recent):  Temp: 97.8 °F (36.6 °C) (02/28/20 1156)  Pulse: 84 (02/28/20 1200)  Resp: 16 (02/28/20 1156)  BP: 113/65 (02/28/20 1156)  SpO2: 97 % (02/28/20 1156) Vital Signs (24h Range):  Temp:  [97.4 °F (36.3 °C)-99.2 °F (37.3 °C)] 97.8 °F (36.6 °C)  Pulse:  [] 84  Resp:  [16-20] 16  SpO2:  [94 %-100 %] 97 %  BP: (113-126)/(52-69) 113/65     Weight: 128.7 kg (283 lb 11.7 oz)  Body mass index is 43.14 kg/m².    Intake/Output Summary (Last 24 hours) at 2/28/2020 1432  Last data filed at 2/28/2020 1200  Gross per 24 hour   Intake 1320 ml   Output --   Net 1320 ml      Physical Exam   Constitutional: She is oriented to person, place, and time. She appears well-developed and well-nourished. No distress.   HENT:   Head: Normocephalic and atraumatic.   Mouth/Throat: Oropharynx is clear and moist.   Eyes: Pupils are equal, round, and reactive to light. Conjunctivae, EOM and lids are normal.   Neck: Normal range of motion. Neck supple.   Cardiovascular: Regular rhythm and intact distal pulses. Tachycardia present.    Murmur heard.   Systolic murmur is present.  Pulses:       Radial pulses are 2+ on the right side, and 2+ on the left side.        Dorsalis pedis pulses are 2+ on the right side, and 2+ on the left side.   Pulmonary/Chest: Effort normal. She has decreased breath sounds in the right lower field and the left lower field. She has no wheezes.   Abdominal: Soft. Normal appearance and bowel sounds are normal. There is no tenderness.   Lymphadenopathy:     She has no cervical adenopathy.   Neurological: She is alert and oriented to person, place, and time. She has normal strength. No sensory deficit.   Skin: Skin is warm, dry and intact.   Psychiatric: She has a normal mood and affect. Her speech is normal and behavior is normal.   Nursing note and vitals reviewed.      Significant Labs:   A1C:   Recent Labs   Lab 02/27/20  0703   HGBA1C 5.8*     CBC:   Recent Labs   Lab 02/27/20 0415 02/28/20  0526   WBC 8.01 4.85   HGB 8.6* 8.2*   HCT 32.2* 30.3*    237     CMP:   Recent Labs   Lab 02/27/20 0415 02/28/20  0526    138   K 4.0 3.9    107   CO2 26 25   * 118*   BUN 11 11   CREATININE 0.9 0.8   CALCIUM 8.5* 8.6*   PROT 6.9 6.6   ALBUMIN 3.2* 3.0*   BILITOT 0.4 0.3   ALKPHOS 86 80   AST 16 16   ALT 14 13   ANIONGAP 7* 6*   EGFRNONAA >60 >60     Coagulation:   Recent Labs   Lab 02/28/20  0526   INR 1.5*     All pertinent labs within the past 24 hours have been reviewed.    Significant Imaging: I have reviewed all pertinent imaging results/findings within the past 24 hours.

## 2020-02-28 NOTE — PLAN OF CARE
Pt on RA. Sats 96%. No distress noted. Aerosol tx & MDI given. Peak flow & IS done. Will continue to monitor.

## 2020-02-28 NOTE — NURSING
19:45-Called from Monitor tech that patient rhythm is showing first degee heart block at this time.     19:48-Dwain Moya NP notified, new telephone order given for EKG routine. Telephone order read back.    20:03-Respiratory Therapist at bedside to complete EKG.    20:05-Dwain Moya NP notified of EKG results, no new orders given.

## 2020-02-28 NOTE — CONSULTS
"[facility]  Adult Nutrition  Progress Note    SUMMARY     Recommendations    1. Continue 2000 kcal diabetic diet.   2. Weekly weights.     Goals: 1.>75% of EEN, EPN by RD follow up.   Nutrition Goal Status: new  Communication of RD Recs: POC    Reason for Assessment    Reason For Assessment: consult  Diagnosis: (Moderate persistent asthma with acute exacerbation )  Relevant Medical History: HTN, DM, COPD  Interdisciplinary Rounds: did not attend  General Information Comments:   20- Pt is a 49 year old woman who presented with SOB. UBW 285lbs, no signifigant change in weight. PO intake 100% of meals. Pt with good appetite. NFPE 20- Pt nourished and obese without indications of malnutrition at this time.  Nutrition Discharge Planning: Adequate nutrition to meet needs via diabetic diet.     Nutrition Risk Screen    Nutrition Risk Screen: no indicators present    Nutrition/Diet History    Spiritual, Cultural Beliefs, Evangelical Practices, Values that Affect Care: no    Anthropometrics    Height Method: Measured  Height: 5' 8" (172.7 cm)  Height (inches): 68 in  Weight Method: Bed Scale  Weight: 128.7 kg (283 lb 11.7 oz)  Weight (lb): 283.73 lb  Ideal Body Weight (IBW), Female: 140 lb  % Ideal Body Weight, Female (lb): 202.66 %  BMI (Calculated): 43.2  BMI Grade: greater than 40 - morbid obesity  Usual Body Weight (UBW), k.5 kg  % Usual Body Weight: 99.59  % Weight Change From Usual Weight: -0.62 %     Lab/Procedures/Meds    Pertinent Labs Reviewed: reviewed  Pertinent Labs Comments: Glucose 118, A1C 5.8  Pertinent Medications Reviewed: reviewed  Pertinent Medications Comments: Atorvastatin, Warfarin    Estimated/Assessed Needs    Weight Used For Calorie Calculations: 128.7 kg (283 lb 11.7 oz)  Energy Calorie Requirements (kcal): 1965 kcals/day(no activity factor due to obesity)  Energy Need Method: Chloe Santoro  Protein Requirements: 51.31-64.13 g/day(0.8-1.0 g/kg IBW)  Weight Used " For Protein Calculations: 64 kg (141 lb 1.5 oz)  Fluid Requirements (mL): 1mL/kcal or per MD  Estimated Fluid Requirement Method: RDA Method  RDA Method (mL): 1965  CHO Requirement: 246 g CHO    Nutrition Prescription Ordered    Current Diet Order: 2000 kcal DM diet    Evaluation of Received Nutrient/Fluid Intake    Energy Calories Required: meeting needs  Protein Required: meeting needs  Fluid Required: meeting needs  Comments: LBM 2.26.20  % Intake of Estimated Energy Needs: 75 - 100 %  % Meal Intake: 75 - 100 %    Nutrition Risk    Level of Risk/Frequency of Follow-up: low     Assessment and Plan    Nutrition Problem  Obesity    Related to (etiology):   Limited physical activity    Signs and Symptoms (as evidenced by):   BMI 43.2    Interventions/Recommendations (treatment strategy):  Collaboration with other providers    Nutrition Diagnosis Status:   New     Monitor and Evaluation    Food and Nutrient Intake: food and beverage intake  Food and Nutrient Adminstration: diet order  Knowledge/Beliefs/Attitudes: food and nutrition knowledge/skill  Physical Activity and Function: nutrition-related ADLs and IADLs  Anthropometric Measurements: weight  Biochemical Data, Medical Tests and Procedures: glucose/endocrine profile  Nutrition-Focused Physical Findings: overall appearance     Malnutrition Assessment     Wai Score: 19  Orbital Region (Subcutaneous Fat Loss): well nourished  Upper Arm Region (Subcutaneous Fat Loss): well nourished  Thoracic and Lumbar Region: well nourished   Islam Region (Muscle Loss): well nourished  Clavicle Bone Region (Muscle Loss): well nourished  Clavicle and Acromion Bone Region (Muscle Loss): well nourished  Scapular Bone Region (Muscle Loss): well nourished  Dorsal Hand (Muscle Loss): well nourished  Patellar Region (Muscle Loss): well nourished  Anterior Thigh Region (Muscle Loss): well nourished  Posterior Calf Region (Muscle Loss): well nourished   Edema (Fluid Accumulation):  0-->no edema present        Nutrition Follow-Up    RD Follow-up?: Yes

## 2020-02-28 NOTE — PLAN OF CARE
Discharge Planning:  Patient admitted on 2-27-20  LOS- day 0  Chart reviewed, Care plan discussed with Mrs Scott  iscussed care plan with treatment team,  attending Dr Hill/Galilea  Consults following are: case mgt  PCP updated in Epic: [facility]  Insurance: medicaid  DME at home: n/a  Current dispo:  Home tomorrow ?  See PCP in 1-2 weeks  Transportation: has reliable  Case management to follow       02/27/20 8575   Discharge Assessment   Assessment Type Discharge Planning Assessment   Confirmed/corrected address and phone number on facesheet? Yes   Assessment information obtained from? Patient;Caregiver;Medical Record   Communicated expected length of stay with patient/caregiver yes   Prior to hospitilization cognitive status: Alert/Oriented   Prior to hospitalization functional status: Independent   Current cognitive status: Alert/Oriented   Current Functional Status: Independent   Lives With child(nadeen), dependent   Able to Return to Prior Arrangements yes   Is patient able to care for self after discharge? Yes   Equipment Currently Used at Home none   Do you have any problems affording any of your prescribed medications? No   Is the patient taking medications as prescribed? yes   Does the patient have transportation home? Yes   Transportation Anticipated family or friend will provide   Discharge Plan A Home   DME Needed Upon Discharge  nebulizer;other (see comments)  (nebulizer ?)   Patient/Family in Agreement with Plan yes

## 2020-02-28 NOTE — ASSESSMENT & PLAN NOTE
- Oxygen saturation 97% room air.     - Prednisone 40mg PO daily to complete a 5 day course  - Continue fluticasone-vilaterol inhaler 1 puff daily  - Duonebs q4h with peak flow  and prn with IS  - Gradual improvement to airflow to post treatament peak flow 160 >> 210 >> 260  - Add cetirizine 5 mg daily  - Guaifenesin prn cough

## 2020-02-28 NOTE — ASSESSMENT & PLAN NOTE
- s/p mechanical mitral valve replacement 2015 and follows Cardiology at [facility]]  - Per medical record, has had recent dosage adjustment due to sub-therapeutic levels (goal INR 2.5-3.5)  - Continue warfarin 5 mg daily and bridge with lovenox 1 mg/kg every 12 hours for now  - Monitor for signs of bleeding and monitor INR daily

## 2020-02-28 NOTE — PLAN OF CARE
Recommendations    1. Continue 2000 kcal diabetic diet.   2. Weekly weights.     Goals: 1.>75% of EEN, EPN by RD follow up.   Nutrition Goal Status: new  Communication of RD Recs: POC

## 2020-02-28 NOTE — PLAN OF CARE
Plan of care reviewed with patient. Pt remains free from fall, injury, and skin breakdown. Positions self independently. Voiding spontaneously. Blood glucose and telemetry monitoring maintained. Cough and deep breathing encouraged. Resp Txs Q4 hrs. Purposeful hourly rounding done. Safety maintained. Patient lying in bed in no distress. Will continue to monitor.

## 2020-02-28 NOTE — PLAN OF CARE
Patient in no apparent distress. Sat's 97-99  % on room air. Shortness of breath and wheezing noted. Aerosol treatments and peak flow done Q 4 . Will continue to monitor.

## 2020-02-29 VITALS
HEART RATE: 86 BPM | SYSTOLIC BLOOD PRESSURE: 128 MMHG | HEIGHT: 68 IN | OXYGEN SATURATION: 100 % | DIASTOLIC BLOOD PRESSURE: 60 MMHG | BODY MASS INDEX: 43.01 KG/M2 | RESPIRATION RATE: 16 BRPM | TEMPERATURE: 98 F | WEIGHT: 283.75 LBS

## 2020-02-29 LAB
ALBUMIN SERPL BCP-MCNC: 2.9 G/DL (ref 3.5–5.2)
ALP SERPL-CCNC: 78 U/L (ref 55–135)
ALT SERPL W/O P-5'-P-CCNC: 13 U/L (ref 10–44)
ANION GAP SERPL CALC-SCNC: 4 MMOL/L (ref 8–16)
AST SERPL-CCNC: 14 U/L (ref 10–40)
BASOPHILS # BLD AUTO: 0.02 K/UL (ref 0–0.2)
BASOPHILS NFR BLD: 0.3 % (ref 0–1.9)
BILIRUB SERPL-MCNC: 0.2 MG/DL (ref 0.1–1)
BUN SERPL-MCNC: 12 MG/DL (ref 6–20)
CALCIUM SERPL-MCNC: 8.8 MG/DL (ref 8.7–10.5)
CHLORIDE SERPL-SCNC: 107 MMOL/L (ref 95–110)
CO2 SERPL-SCNC: 25 MMOL/L (ref 23–29)
CREAT SERPL-MCNC: 0.9 MG/DL (ref 0.5–1.4)
DIFFERENTIAL METHOD: ABNORMAL
EOSINOPHIL # BLD AUTO: 0.1 K/UL (ref 0–0.5)
EOSINOPHIL NFR BLD: 1.2 % (ref 0–8)
ERYTHROCYTE [DISTWIDTH] IN BLOOD BY AUTOMATED COUNT: 17.2 % (ref 11.5–14.5)
EST. GFR  (AFRICAN AMERICAN): >60 ML/MIN/1.73 M^2
EST. GFR  (NON AFRICAN AMERICAN): >60 ML/MIN/1.73 M^2
GLUCOSE SERPL-MCNC: 110 MG/DL (ref 70–110)
HCT VFR BLD AUTO: 30.4 % (ref 37–48.5)
HGB BLD-MCNC: 8.3 G/DL (ref 12–16)
IMM GRANULOCYTES # BLD AUTO: 0.01 K/UL (ref 0–0.04)
IMM GRANULOCYTES NFR BLD AUTO: 0.1 % (ref 0–0.5)
INR PPP: 1.6 (ref 0.8–1.2)
LYMPHOCYTES # BLD AUTO: 1.2 K/UL (ref 1–4.8)
LYMPHOCYTES NFR BLD: 18.3 % (ref 18–48)
MAGNESIUM SERPL-MCNC: 3.6 MG/DL (ref 1.6–2.6)
MCH RBC QN AUTO: 23.5 PG (ref 27–31)
MCHC RBC AUTO-ENTMCNC: 27.3 G/DL (ref 32–36)
MCV RBC AUTO: 86 FL (ref 82–98)
MONOCYTES # BLD AUTO: 0.9 K/UL (ref 0.3–1)
MONOCYTES NFR BLD: 12.6 % (ref 4–15)
NEUTROPHILS # BLD AUTO: 4.5 K/UL (ref 1.8–7.7)
NEUTROPHILS NFR BLD: 67.5 % (ref 38–73)
NRBC BLD-RTO: 0 /100 WBC
PHOSPHATE SERPL-MCNC: 2.9 MG/DL (ref 2.7–4.5)
PLATELET # BLD AUTO: 223 K/UL (ref 150–350)
PMV BLD AUTO: 9.4 FL (ref 9.2–12.9)
POCT GLUCOSE: 77 MG/DL (ref 70–110)
POTASSIUM SERPL-SCNC: 4.6 MMOL/L (ref 3.5–5.1)
PROT SERPL-MCNC: 6.6 G/DL (ref 6–8.4)
PROTHROMBIN TIME: 17.4 SEC (ref 9–12.5)
RBC # BLD AUTO: 3.53 M/UL (ref 4–5.4)
SODIUM SERPL-SCNC: 136 MMOL/L (ref 136–145)
WBC # BLD AUTO: 6.72 K/UL (ref 3.9–12.7)

## 2020-02-29 PROCEDURE — 94640 AIRWAY INHALATION TREATMENT: CPT

## 2020-02-29 PROCEDURE — 36415 COLL VENOUS BLD VENIPUNCTURE: CPT

## 2020-02-29 PROCEDURE — 84100 ASSAY OF PHOSPHORUS: CPT

## 2020-02-29 PROCEDURE — 25000003 PHARM REV CODE 250: Performed by: NURSE PRACTITIONER

## 2020-02-29 PROCEDURE — 80053 COMPREHEN METABOLIC PANEL: CPT

## 2020-02-29 PROCEDURE — 99226 PR SUBSEQUENT OBSERVATION CARE,LEVEL III: CPT | Mod: ,,, | Performed by: NURSE PRACTITIONER

## 2020-02-29 PROCEDURE — 94761 N-INVAS EAR/PLS OXIMETRY MLT: CPT

## 2020-02-29 PROCEDURE — 63600175 PHARM REV CODE 636 W HCPCS: Performed by: NURSE PRACTITIONER

## 2020-02-29 PROCEDURE — 99226 PR SUBSEQUENT OBSERVATION CARE,LEVEL III: ICD-10-PCS | Mod: ,,, | Performed by: NURSE PRACTITIONER

## 2020-02-29 PROCEDURE — 85610 PROTHROMBIN TIME: CPT

## 2020-02-29 PROCEDURE — 83735 ASSAY OF MAGNESIUM: CPT

## 2020-02-29 PROCEDURE — 85025 COMPLETE CBC W/AUTO DIFF WBC: CPT

## 2020-02-29 PROCEDURE — 96372 THER/PROPH/DIAG INJ SC/IM: CPT

## 2020-02-29 PROCEDURE — G0378 HOSPITAL OBSERVATION PER HR: HCPCS

## 2020-02-29 PROCEDURE — 99900035 HC TECH TIME PER 15 MIN (STAT)

## 2020-02-29 PROCEDURE — 25000242 PHARM REV CODE 250 ALT 637 W/ HCPCS: Performed by: NURSE PRACTITIONER

## 2020-02-29 RX ORDER — WARFARIN SODIUM 5 MG/1
5 TABLET ORAL DAILY
Qty: 30 TABLET | Refills: 0 | Status: ON HOLD | OUTPATIENT
Start: 2020-02-29 | End: 2023-10-31 | Stop reason: HOSPADM

## 2020-02-29 RX ORDER — PREDNISONE 20 MG/1
40 TABLET ORAL DAILY
Qty: 6 TABLET | Refills: 0 | Status: SHIPPED | OUTPATIENT
Start: 2020-03-01 | End: 2020-03-04

## 2020-02-29 RX ORDER — ALBUTEROL SULFATE 90 UG/1
1-2 AEROSOL, METERED RESPIRATORY (INHALATION) EVERY 6 HOURS PRN
Qty: 8 G | Refills: 0 | Status: ON HOLD | OUTPATIENT
Start: 2020-02-29 | End: 2021-11-03 | Stop reason: SDUPTHER

## 2020-02-29 RX ORDER — IPRATROPIUM BROMIDE AND ALBUTEROL SULFATE 2.5; .5 MG/3ML; MG/3ML
3 SOLUTION RESPIRATORY (INHALATION) EVERY 4 HOURS
Qty: 252 ML | Refills: 0 | Status: SHIPPED | OUTPATIENT
Start: 2020-02-29 | End: 2021-07-07 | Stop reason: SDUPTHER

## 2020-02-29 RX ORDER — FLUTICASONE FUROATE AND VILANTEROL 100; 25 UG/1; UG/1
1 POWDER RESPIRATORY (INHALATION) DAILY
Qty: 14 EACH | Refills: 1 | Status: ON HOLD | OUTPATIENT
Start: 2020-03-01 | End: 2021-11-03

## 2020-02-29 RX ORDER — GUAIFENESIN 100 MG/5ML
200 SOLUTION ORAL EVERY 4 HOURS PRN
Qty: 118 ML | Refills: 0 | Status: ON HOLD | OUTPATIENT
Start: 2020-02-29 | End: 2021-11-03

## 2020-02-29 RX ORDER — ENOXAPARIN SODIUM 150 MG/ML
1 INJECTION SUBCUTANEOUS EVERY 12 HOURS
Qty: 12.1 ML | Refills: 0 | Status: SHIPPED | OUTPATIENT
Start: 2020-02-29 | End: 2020-03-07

## 2020-02-29 RX ADMIN — ATORVASTATIN CALCIUM 40 MG: 20 TABLET, FILM COATED ORAL at 09:02

## 2020-02-29 RX ADMIN — FLUTICASONE FUROATE AND VILANTEROL TRIFENATATE 1 PUFF: 100; 25 POWDER RESPIRATORY (INHALATION) at 08:02

## 2020-02-29 RX ADMIN — CARVEDILOL 6.25 MG: 6.25 TABLET, FILM COATED ORAL at 08:02

## 2020-02-29 RX ADMIN — IPRATROPIUM BROMIDE AND ALBUTEROL SULFATE 3 ML: .5; 3 SOLUTION RESPIRATORY (INHALATION) at 12:02

## 2020-02-29 RX ADMIN — IPRATROPIUM BROMIDE AND ALBUTEROL SULFATE 3 ML: .5; 3 SOLUTION RESPIRATORY (INHALATION) at 04:02

## 2020-02-29 RX ADMIN — PREDNISONE 40 MG: 20 TABLET ORAL at 09:02

## 2020-02-29 RX ADMIN — ENOXAPARIN SODIUM 135 MG: 150 INJECTION SUBCUTANEOUS at 05:02

## 2020-02-29 RX ADMIN — IPRATROPIUM BROMIDE AND ALBUTEROL SULFATE 3 ML: .5; 3 SOLUTION RESPIRATORY (INHALATION) at 08:02

## 2020-02-29 NOTE — PLAN OF CARE
02/29/20 1142   Final Note   Assessment Type Final Discharge Note   Anticipated Discharge Disposition Home   What phone number can be called within the next 1-3 days to see how you are doing after discharge? [phone]   Discharge plans and expectations educations in teach back method with documentation complete? Yes   Right Care Referral Info   Post Acute Recommendation No Care

## 2020-02-29 NOTE — NURSING
Discharge instructions reviewed with patient; patient verbalize understanding. IV to the right A/C removed; no swelling or redness noted to site. Patient dressed awaiting transport.

## 2020-02-29 NOTE — PLAN OF CARE
AAOX4, neuro signs intact. No complaints of pain. Tolerating diabetic/2kcal diet with PO fluids well. PIV saline locked and drsg CDI.  SCDs maintained. Ambulating to bathroom independently. Telemetry monitoring maintained.   Plan of care reviewed with patient and all questions answered. Call light in reach, bed low and locked, side rails x 2, purposeful rounding performed.       Problem: Adult Inpatient Plan of Care  Goal: Plan of Care Review  Outcome: Ongoing, Progressing  Goal: Absence of Hospital-Acquired Illness or Injury  Outcome: Ongoing, Progressing  Goal: Optimal Comfort and Wellbeing  Outcome: Ongoing, Progressing  Goal: Readiness for Transition of Care  Outcome: Ongoing, Progressing     Problem: Adjustment to Illness (Heart Failure)  Goal: Optimal Coping  Outcome: Ongoing, Progressing     Problem: Arrhythmia/Dysrhythmia (Heart Failure)  Goal: Stable Heart Rate and Rhythm  Outcome: Ongoing, Progressing     Problem: Cardiac Output Decreased (Heart Failure)  Goal: Optimal Cardiac Output  Outcome: Ongoing, Progressing     Problem: Fluid Imbalance (Heart Failure)  Goal: Fluid Balance  Outcome: Ongoing, Progressing     Problem: Functional Ability Impaired (Heart Failure)  Goal: Optimal Functional Ability  Outcome: Ongoing, Progressing     Problem: Oral Intake Inadequate (Heart Failure)  Goal: Optimal Nutrition Intake  Outcome: Ongoing, Progressing     Problem: Respiratory Compromise (Heart Failure)  Goal: Effective Oxygenation and Ventilation  Outcome: Ongoing, Progressing     Problem: Sleep Disordered Breathing (Heart Failure)  Goal: Effective Breathing Pattern During Sleep  Outcome: Ongoing, Progressing     Problem: Adjustment to Illness COPD (Chronic Obstructive Pulmonary Disease)  Goal: Optimal Chronic Illness Coping  Outcome: Ongoing, Progressing     Problem: Functional Ability Impaired COPD (Chronic Obstructive Pulmonary Disease)  Goal: Optimal Level of Functional Taylor  Outcome: Ongoing,  Progressing     Problem: Oral Intake Inadequate COPD (Chronic Obstructive Pulmonary Disease)  Goal: Improved Nutrition Intake  Outcome: Ongoing, Progressing     Problem: Infection COPD (Chronic Obstructive Pulmonary Disease)  Goal: Absence of Infection Signs/Symptoms  Outcome: Ongoing, Progressing     Problem: Respiratory Compromise COPD (Chronic Obstructive Pulmonary Disease)  Goal: Effective Oxygenation and Ventilation  Outcome: Ongoing, Progressing     Problem: Diabetes Comorbidity  Goal: Blood Glucose Level Within Desired Range  Outcome: Ongoing, Progressing

## 2020-02-29 NOTE — PLAN OF CARE
Pt resting in bed c no respiratory distress noted . Care resumed from Hali HAMMONDS. On room air c O2 per nc PRN. Mild expiratory wheeze noted in posterior lobes c diminished BBS in posterior lobes. No stridor or distress. SOB c exertion. Encouraged ambulation. Telemetry monitor on c SR and junctional rythm noted. Denies chest pain or cardiac symptoms. Family at bedside.Safety precautions maintained.

## 2020-03-03 LAB
BACTERIA BLD CULT: NORMAL
BACTERIA BLD CULT: NORMAL

## 2020-03-04 NOTE — DISCHARGE SUMMARY
[facility]  Hospital Medicine  Discharge Summary      Patient Name: Delaney Scott  MRN: [id number]  Admission Date: 2/27/2020  Hospital Length of Stay: 0 days  Discharge Date and Time: 2/29/2020  1:24 PM  Attending Physician: Abby Hill MD   Discharging Provider: Galilea Evans NP  Primary Care Provider: [facility]      HPI:   Delaney Scott is a 49 year old woman with medical history of moderate persistent asthma, s/p aortic valve replacement on warfarin, hypertension, type 2 diabetes mellitus and morbid obesity.  The patient presented to the [facility] ED with complaint of shortness of breath with wheezing over the past two weeks. She reports she started with nasal congestion and has developed wheezing and non-productive cough.  She denies fever or chills and has not reported sick contacts. Initial evaluation in the ED, with stable oxygen saturation at 100% and chest x-ray without focal consolidation or effusion.  She was treated with duo-nebulizer aerosol treatments.  She will be admitted under Observation for management of acute exacerbation of asthma.      Per medical record review, the patient is s/p mechanical mitral valve replacement in 2015 ([place] valve.)  She is on warfarin for anticoagulation.  INR found to be subtherapeutic 1.6 on admission with ideal range 2.5-3.5.  She has recently had recent dosage adjustments.  She will be placed on lovenox bridge (1 mg/kg q12 hours)  during observation stay.        Hospital Course:   After admission, the patient was treated with duo-nebulizer aerosol treatments every four hours and  oral prednisone 40 mg daily. She showed minimal clinical improvement.  Prior to discharge, the patient showed significant improvement in peak flow values and was moving moving air well on physical exam.  I have recommended she follow closely with her primary care provider to ensure resolution of her symptoms and for continued  management of her asthma.  She is a long term smoker and would like to quit. However she declined use of nicotine patches during hospital stay.       In terms of her chronic anticoagulation for mechanical mitral valve, INR on admission found to be sub-therapeutic (INR 1.6).  She was continued on warfarin 5 mg daily and was started on lovenox bridge 1mg/kg every 12 hours.  She had minimal improvement to INR and I she will be discharge on lovenox until INR reaches therapeutic level 2.5.  She is followed by \[facility] and I have recommended she follow up in two days for retesting of INR.  Dietician was consulted for dietary recommendations in setting of morbid obesity and chronic warfarin.   Diagnosis, plan of care and management were discussed with the parent who agreed with plan and was eager for discharge.          Consults:   Consults (From admission, onward)        Status Ordering Provider     Inpatient consult to Registered Dietitian/Nutritionist  Once     Provider:  (Not yet assigned)    ALBERT Alvarado        Service: Hospital Medicine    Final Active Diagnoses:    Diagnosis Date Noted POA    PRINCIPAL PROBLEM:  Moderate persistent asthma with acute exacerbation [J45.41] 02/27/2020 Yes    Essential hypertension [I10] 02/27/2020 Yes    S/P MVR (mitral valve replacement) [Z95.2] 02/27/2020 Not Applicable    Type 2 diabetes mellitus, without long-term current use of insulin [E11.9] 02/27/2020 Yes    Morbid obesity [E66.01] 02/27/2020 Yes    Chronic anticoagulation [Z79.01] 02/27/2020 Not Applicable    Continuous tobacco abuse [Z72.0] 02/27/2020 Yes      Problems Resolved During this Admission:       Discharged Condition: good    Disposition: Home or Self Care    Follow Up:  Follow-up Information     [facility]. Schedule an appointment as soon as possible for a visit in 2 days.    Why:  To establish care   Contact information:  [address] [place]  [address]  [phone]             [facility] - Endo/Diabetes/Coumadin. Go on 3/2/2020.    Specialties:  Endocrinology, Nephrology  Why:  for continued managment of coumadin levels  Contact information:  [address] [address]  [phone]             [facility] - Coumadin. Schedule an appointment as soon as possible for a visit in 2 days.    Specialty:  Cardiology  Why:  to establish care for managment of coumadin  Contact information:  [address]  [facility] [address] [phone]  Additional information:  3rd floor               Patient Instructions:      Diet diabetic     Diet Cardiac     Notify your health care provider if you experience any of the following:  difficulty breathing or increased cough     Notify your health care provider if you experience any of the following:  temperature >100.4     Activity as tolerated       Significant Diagnostic Studies: Labs: All labs within the past 24 hours have been reviewed       Medications:  Reconciled Home Medications:      Medication List      START taking these medications    albuterol-ipratropium 2.5 mg-0.5 mg/3 mL nebulizer solution  Commonly known as:  DUO-NEB  Take 3 mLs by nebulization every 4 (four) hours. Rescue for 14 days     enoxaparin 150 mg/mL Syrg  Commonly known as:  LOVENOX  Inject 0.86 mLs (135 mg total) into the skin every 12 (twelve) hours. Continue until INR 2.5 for 7 days     fluticasone furoate-vilanterol 100-25 mcg/dose diskus inhaler  Commonly known as:  BREO  Inhale 1 puff into the lungs once daily. Controller  Replaces:  fluticasone-salmeterol 100-50 mcg/dose 100-50 mcg/dose diskus inhaler     guaifenesin 100 mg/5 ml 100 mg/5 mL syrup  Commonly known as:  ROBITUSSIN  Take 10 mLs (200 mg total) by mouth every 4 (four) hours as needed for Congestion.     predniSONE 20 MG tablet  Commonly known as:  DELTASONE  Take 2 tablets (40 mg total) by mouth once daily. for 3 days        CHANGE how you take  these medications    furosemide 20 MG tablet  Commonly known as:  LASIX  Take 1 tablet (20 mg total) by mouth once daily.  What changed:  additional instructions     metFORMIN 1000 MG tablet  Commonly known as:  GLUCOPHAGE  Take 1,000 mg by mouth 2 (two) times daily with meals.  What changed:  Another medication with the same name was removed. Continue taking this medication, and follow the directions you see here.     warfarin 5 MG tablet  Commonly known as:  COUMADIN  Take 1 tablet (5 mg total) by mouth Daily.  What changed:    · medication strength  · how much to take  · when to take this        CONTINUE taking these medications    albuterol 90 mcg/actuation inhaler  Commonly known as:  PROVENTIL/VENTOLIN HFA  Inhale 1-2 puffs into the lungs every 6 (six) hours as needed for Wheezing or Shortness of Breath. Rescue     atorvastatin 40 MG tablet  Commonly known as:  LIPITOR  Take 40 mg by mouth once daily.     carvediloL 6.25 MG tablet  Commonly known as:  COREG  Take 6.25 mg by mouth 2 (two) times daily with meals.     cyclobenzaprine 10 MG tablet  Commonly known as:  FLEXERIL  Take 10 mg by mouth 3 (three) times daily.        STOP taking these medications    budesonide-formoterol 160-4.5 mcg 160-4.5 mcg/actuation Hfaa  Commonly known as:  SYMBICORT     fluticasone-salmeterol 100-50 mcg/dose 100-50 mcg/dose diskus inhaler  Commonly known as:  ADVAIR  Replaced by:  fluticasone furoate-vilanterol 100-25 mcg/dose diskus inhaler            Time spent on the discharge of patient: > 30 minutes  Patient was seen and examined on the date of discharge and determined to be suitable for discharge.         Galilea Evans NP  Department of Hospital Medicine  [facility]

## 2020-03-04 NOTE — HOSPITAL COURSE
After admission, the patient was treated with duo-nebulizer aerosol treatments every four hours and  oral prednisone 40 mg daily. She showed minimal clinical improvement.  Prior to discharge, the patient showed significant improvement in peak flow values and was moving moving air well on physical exam.  I have recommended she follow closely with her primary care provider to ensure resolution of her symptoms and for continued management of her asthma.  She is a long term smoker and would like to quit. However she declined use of nicotine patches during hospital stay.       In terms of her chronic anticoagulation for mechanical mitral valve, INR on admission found to be sub-therapeutic (INR 1.6).  She was continued on warfarin 5 mg daily and was started on lovenox bridge 1mg/kg every 12 hours.  She had minimal improvement to INR and I she will be discharge on lovenox until INR reaches therapeutic level 2.5.  She is followed by [facility] and I have recommended she follow up in two days for retesting of INR.  Dietician was consulted for dietary recommendations in setting of morbid obesity and chronic warfarin.   Diagnosis, plan of care and management were discussed with the parent who agreed with plan and was eager for discharge.

## 2020-05-18 ENCOUNTER — HOSPITAL ENCOUNTER (EMERGENCY)
Facility: OTHER | Age: 50
Discharge: HOME | End: 2020-05-18
Attending: EMERGENCY MEDICINE
Payer: MEDICAID

## 2020-05-18 VITALS
BODY MASS INDEX: 43.95 KG/M2 | HEART RATE: 83 BPM | HEIGHT: 67 IN | RESPIRATION RATE: 18 BRPM | SYSTOLIC BLOOD PRESSURE: 121 MMHG | OXYGEN SATURATION: 96 % | WEIGHT: 280 LBS | DIASTOLIC BLOOD PRESSURE: 65 MMHG | TEMPERATURE: 99 F

## 2020-05-18 DIAGNOSIS — R09.A2 SENSATION OF FOREIGN BODY IN ESOPHAGUS: ICD-10-CM

## 2020-05-18 LAB
B-HCG UR QL: NEGATIVE
CTP QC/QA: YES

## 2020-05-18 PROCEDURE — 25000003 PHARM REV CODE 250: Performed by: EMERGENCY MEDICINE

## 2020-05-18 PROCEDURE — 99284 EMERGENCY DEPT VISIT MOD MDM: CPT | Mod: 25

## 2020-05-18 PROCEDURE — 81025 URINE PREGNANCY TEST: CPT | Performed by: EMERGENCY MEDICINE

## 2020-05-18 RX ORDER — LIDOCAINE HYDROCHLORIDE 20 MG/ML
10 SOLUTION OROPHARYNGEAL
Status: COMPLETED | OUTPATIENT
Start: 2020-05-18 | End: 2020-05-18

## 2020-05-18 RX ORDER — OMEPRAZOLE 20 MG/1
20 CAPSULE, DELAYED RELEASE ORAL 2 TIMES DAILY
Qty: 60 CAPSULE | Refills: 2 | Status: ON HOLD | OUTPATIENT
Start: 2020-05-18 | End: 2021-11-05

## 2020-05-18 RX ORDER — MAG HYDROX/ALUMINUM HYD/SIMETH 200-200-20
30 SUSPENSION, ORAL (FINAL DOSE FORM) ORAL
Status: COMPLETED | OUTPATIENT
Start: 2020-05-18 | End: 2020-05-18

## 2020-05-18 RX ADMIN — LIDOCAINE HYDROCHLORIDE 10 ML: 20 SOLUTION ORAL; TOPICAL at 04:05

## 2020-05-18 RX ADMIN — ALUMINUM HYDROXIDE, MAGNESIUM HYDROXIDE, AND SIMETHICONE 30 ML: 200; 200; 20 SUSPENSION ORAL at 04:05

## 2020-05-18 NOTE — ED TRIAGE NOTES
Pt presents to ed c/o not being able to swallow. Pt states that it started this morning after taking her morning meds. Pt states that she has pain in her neck that feels like a fullness with a little tightness. Pt denies any fever, chills, resp pattern even and non labored. She admits to intermittent diarrhea. Pt AAox4

## 2020-05-18 NOTE — ED PROVIDER NOTES
"Encounter Date: 5/18/2020    SCRIBE #1 NOTE: I, Stanley Burrows, am scribing for, and in the presence of, Dr. Vu.       History     Chief Complaint   Patient presents with    "feels like something is stuck in my throat'     states she tried to take her AM medications today, believes the pill is stuck in her throat; states she is burping, 'i have to cough to make myself burp"      Time seen by provider: 4:09 PM    This is a 49 y.o. female who presents with complaint of foreign body sensation in the throat that began this morning. The patient reports that she woke up this morning and took her medications. While trying to take the medications she felt like "the pills got stuck". She tried drinking water with minimal relief of the sensation, but she was able to get the pills and the water down. She is also experiencing neck pain, which she describes as a tightness and rates a 7/10. She reports that throughout the day today, she has been unable to belch. She tried to eat soup and "had to cough it up". She states that she is able to keep down small amounts of liquids. A few days prior she has noticed that her acid reflux symptoms have been worse, which has caused her to experience increased belching. She denies fever, sore throat, chest pain, shortness of breath, nausea, vomiting, and dysuria. She admits to smoking approximately five cigarettes a day. She reports having a chronic cough from her smoking.    The history is provided by the patient.     Review of patient's allergies indicates:  No Known Allergies  Past Medical History:   Diagnosis Date    Anticoagulant long-term use     Arthritis     Asthma     COPD with acute exacerbation     Diabetes mellitus     Hypertension      Past Surgical History:   Procedure Laterality Date    CARDIAC SURGERY      s/p aortica valve replacement      TUBAL LIGATION       No family history on file.  Social History     Tobacco Use    Smoking status: Current Every Day Smoker "    Smokeless tobacco: Never Used   Substance Use Topics    Alcohol use: No    Drug use: No     Review of Systems   Constitutional: Negative for chills and fever.   HENT: Positive for trouble swallowing. Negative for congestion and sore throat.         Positive for the sensation of something stuck in her throat.   Eyes: Negative for visual disturbance.   Respiratory: Positive for cough. Negative for shortness of breath.    Cardiovascular: Negative for chest pain and palpitations.   Gastrointestinal: Negative for abdominal pain, diarrhea and vomiting.   Genitourinary: Negative for decreased urine volume, dysuria and vaginal discharge.   Musculoskeletal: Negative for joint swelling, neck pain and neck stiffness.   Skin: Negative for rash and wound.   Neurological: Negative for weakness, numbness and headaches.   Psychiatric/Behavioral: Negative for confusion.       Physical Exam     Initial Vitals [05/18/20 1604]   BP Pulse Resp Temp SpO2   (!) 140/66 94 18 98.6 °F (37 °C) 100 %      MAP       --         Physical Exam    Nursing note and vitals reviewed.  Constitutional: She appears well-developed and well-nourished. She is not diaphoretic. No distress.   Normal speech.  Handling all secretions.   HENT:   Head: Normocephalic and atraumatic.   Mouth/Throat: Uvula is midline and oropharynx is clear and moist.   Cobblestoning of posterior oropharynx.    Eyes: EOM are normal. Pupils are equal, round, and reactive to light. Right eye exhibits no discharge. Left eye exhibits no discharge.   Neck: Normal range of motion. No thyroid mass present.   Cardiovascular: Normal rate, regular rhythm and normal heart sounds. Exam reveals no gallop and no friction rub.    No murmur heard.  Pulmonary/Chest: Breath sounds normal. No respiratory distress. She has no wheezes. She has no rhonchi. She has no rales.   Abdominal: Soft. There is no tenderness. There is no rebound and no guarding.   Musculoskeletal: Normal range of motion. She  exhibits no edema or tenderness.   Lymphadenopathy:     She has no cervical adenopathy.   Neurological: She is alert and oriented to person, place, and time.   Skin: Skin is warm and dry. No rash and no abscess noted. No erythema. No pallor.   Psychiatric: She has a normal mood and affect. Her behavior is normal. Judgment and thought content normal.         ED Course   Procedures  Labs Reviewed   POCT URINE PREGNANCY          Imaging Results          X-Ray Chest AP Portable (Final result)  Result time 05/18/20 16:53:52    Final result by Oliva Reich MD (05/18/20 16:53:52)                 Impression:      As above      Electronically signed by: Oliva Reich MD  Date:    05/18/2020  Time:    16:53             Narrative:    EXAMINATION:  XR CHEST AP PORTABLE    CLINICAL HISTORY:  Other specified diseases of esophagus    TECHNIQUE:  Single frontal view of the chest was performed.    COMPARISON:  February 27, 2020    FINDINGS:  The patient again demonstrates surgical change of sternotomy wires, mediastinal clips and prosthetic heart valve.  Additional linear density projects over the upper left chest unchanged compared to prior.  Heart size is mildly enlarged but stable.  There is no pleural effusion.  Osseous structures are intact.  There are degenerative changes in the spine.  There is no focal consolidation.                               X-Ray Neck Soft Tissue (Final result)  Result time 05/18/20 16:56:19    Final result by Oliva Reich MD (05/18/20 16:56:19)                 Impression:      As above      Electronically signed by: Oliva Reich MD  Date:    05/18/2020  Time:    16:56             Narrative:    EXAMINATION:  XR NECK SOFT TISSUE    CLINICAL HISTORY:  Other specified diseases of esophagus    TECHNIQUE:  AP and lateral soft tissue views the neck were performed.    COMPARISON:  None.    FINDINGS:  The osseous structures are intact.  No abnormality demonstrated within the soft tissues.                               X-Rays:   Independently Interpreted Readings:   Chest X-Ray: No large infiltrate, effusion, or PTX. There is a liner metallic foreign body near left clavicle, which is present on prior radiographs.    Other Readings:  Neck: No fracutre. No dislocation. No soft tissue swelling.    Medical Decision Making:   History:   Old Medical Records: I decided to obtain old medical records.  Independently Interpreted Test(s):   I have ordered and independently interpreted X-rays - see prior notes.  Clinical Tests:   Lab Tests: Ordered and Reviewed  Radiological Study: Ordered and Reviewed  ED Management:  6:06 PM: Paged GI.    Emergent evaluation a 49-year-old female who presents with complaint of foreign body sensation after taking morning medications.  Patient reports difficulty keeping down solids, but is able to tolerate liquids.  Vital signs are benign, afebrile.  On exam there is no distress.  She has a normal voice is handling secretions.  She was able to tolerate a GI cocktail given orally without vomiting.  She was able to tolerate 2 bites of a sandwich that she was given without vomiting, but refused to eat more stating that she was scared I might vomit.  Soft tissue neck x-ray and chest x-ray are benign.  There is a foreign body on the chest x-ray near the left clavicle which has been present on previous over the past many months.  I do not think this is related to current symptoms.  I discussed the case with GI doctor Jordan, who recommends twice a day Prilosec and close follow-up with gastroenterology.  Patient does not meet criteria for emergent endoscopy.  She was discharged in good condition and encouraged close follow-up or return for new or worsening symptoms.  This presentation may represent pill esophagitis rather than true foreign body.            Scribe Attestation:   Scribe #1: I performed the above scribed service and the documentation accurately describes the services I  performed. I attest to the accuracy of the note.    Attending Attestation:           Physician Attestation for Scribe:  Physician Attestation Statement for Scribe #1: I, Dr. Vu, reviewed documentation, as scribed by Stanley Burrows in my presence, and it is both accurate and complete.                               Clinical Impression:     1. Sensation of foreign body in esophagus    2. Sensation of foreign body in esophagus              ED Disposition Condition    Discharge Stable        ED Prescriptions     Medication Sig Dispense Start Date End Date Auth. Provider    omeprazole (PRILOSEC) 20 MG capsule Take 1 capsule (20 mg total) by mouth 2 (two) times a day. 60 capsule 5/18/2020 5/18/2021 Marisabel Vu MD        Follow-up Information     Follow up With Specialties Details Why Contact Info    [facility] Gastroenterology Schedule an appointment as soon as possible for a visit  call tomorrow to schedule close follow up [address]  [address]  [place] [address]  [phone]      [facility]  As needed, If symptoms worsen [address] [facility] [address] [facility] [id number] [phone]                                     Marisabel Vu MD  05/18/20 1940

## 2021-06-08 ENCOUNTER — HOSPITAL ENCOUNTER (EMERGENCY)
Facility: OTHER | Age: 51
Discharge: HOME | End: 2021-06-08
Attending: EMERGENCY MEDICINE
Payer: MEDICAID

## 2021-06-08 VITALS
HEIGHT: 67 IN | WEIGHT: 290 LBS | SYSTOLIC BLOOD PRESSURE: 145 MMHG | HEART RATE: 80 BPM | TEMPERATURE: 98 F | DIASTOLIC BLOOD PRESSURE: 83 MMHG | RESPIRATION RATE: 18 BRPM | BODY MASS INDEX: 45.52 KG/M2 | OXYGEN SATURATION: 97 %

## 2021-06-08 DIAGNOSIS — R06.02 SHORTNESS OF BREATH: ICD-10-CM

## 2021-06-08 DIAGNOSIS — J44.1 COPD EXACERBATION: Primary | ICD-10-CM

## 2021-06-08 LAB
ALBUMIN SERPL BCP-MCNC: 3 G/DL (ref 3.5–5.2)
ALP SERPL-CCNC: 98 U/L (ref 55–135)
ALT SERPL W/O P-5'-P-CCNC: 14 U/L (ref 10–44)
ANION GAP SERPL CALC-SCNC: 4 MMOL/L (ref 8–16)
AST SERPL-CCNC: 18 U/L (ref 10–40)
BASOPHILS # BLD AUTO: 0.03 K/UL (ref 0–0.2)
BASOPHILS NFR BLD: 0.5 % (ref 0–1.9)
BILIRUB SERPL-MCNC: 0.2 MG/DL (ref 0.1–1)
BNP SERPL-MCNC: 66 PG/ML (ref 0–99)
BUN SERPL-MCNC: 10 MG/DL (ref 6–20)
CALCIUM SERPL-MCNC: 8.4 MG/DL (ref 8.7–10.5)
CHLORIDE SERPL-SCNC: 109 MMOL/L (ref 95–110)
CO2 SERPL-SCNC: 26 MMOL/L (ref 23–29)
CREAT SERPL-MCNC: 0.8 MG/DL (ref 0.5–1.4)
CTP QC/QA: YES
DIFFERENTIAL METHOD: ABNORMAL
EOSINOPHIL # BLD AUTO: 0.3 K/UL (ref 0–0.5)
EOSINOPHIL NFR BLD: 4.9 % (ref 0–8)
ERYTHROCYTE [DISTWIDTH] IN BLOOD BY AUTOMATED COUNT: 13.8 % (ref 11.5–14.5)
EST. GFR  (AFRICAN AMERICAN): >60 ML/MIN/1.73 M^2
EST. GFR  (NON AFRICAN AMERICAN): >60 ML/MIN/1.73 M^2
GLUCOSE SERPL-MCNC: 99 MG/DL (ref 70–110)
HCT VFR BLD AUTO: 37.1 % (ref 37–48.5)
HCV AB SERPL QL IA: NEGATIVE
HGB BLD-MCNC: 11.4 G/DL (ref 12–16)
HIV 1+2 AB+HIV1 P24 AG SERPL QL IA: NEGATIVE
IMM GRANULOCYTES # BLD AUTO: 0.01 K/UL (ref 0–0.04)
IMM GRANULOCYTES NFR BLD AUTO: 0.2 % (ref 0–0.5)
INR PPP: 3.1 (ref 0.8–1.2)
LYMPHOCYTES # BLD AUTO: 1.3 K/UL (ref 1–4.8)
LYMPHOCYTES NFR BLD: 21.1 % (ref 18–48)
MCH RBC QN AUTO: 31.1 PG (ref 27–31)
MCHC RBC AUTO-ENTMCNC: 30.7 G/DL (ref 32–36)
MCV RBC AUTO: 101 FL (ref 82–98)
MONOCYTES # BLD AUTO: 0.5 K/UL (ref 0.3–1)
MONOCYTES NFR BLD: 7.6 % (ref 4–15)
NEUTROPHILS # BLD AUTO: 4 K/UL (ref 1.8–7.7)
NEUTROPHILS NFR BLD: 65.7 % (ref 38–73)
NRBC BLD-RTO: 0 /100 WBC
PLATELET # BLD AUTO: 205 K/UL (ref 150–450)
PMV BLD AUTO: 10 FL (ref 9.2–12.9)
POTASSIUM SERPL-SCNC: 4.7 MMOL/L (ref 3.5–5.1)
PROT SERPL-MCNC: 7 G/DL (ref 6–8.4)
PROTHROMBIN TIME: 31.5 SEC (ref 9–12.5)
RBC # BLD AUTO: 3.67 M/UL (ref 4–5.4)
SARS-COV-2 RDRP RESP QL NAA+PROBE: NEGATIVE
SODIUM SERPL-SCNC: 139 MMOL/L (ref 136–145)
TROPONIN I SERPL DL<=0.01 NG/ML-MCNC: <0.006 NG/ML (ref 0–0.03)
TROPONIN I SERPL DL<=0.01 NG/ML-MCNC: <0.006 NG/ML (ref 0–0.03)
WBC # BLD AUTO: 6.07 K/UL (ref 3.9–12.7)

## 2021-06-08 PROCEDURE — 93010 ELECTROCARDIOGRAM REPORT: CPT | Mod: ,,, | Performed by: INTERNAL MEDICINE

## 2021-06-08 PROCEDURE — 85025 COMPLETE CBC W/AUTO DIFF WBC: CPT | Performed by: NURSE PRACTITIONER

## 2021-06-08 PROCEDURE — 83880 ASSAY OF NATRIURETIC PEPTIDE: CPT | Performed by: NURSE PRACTITIONER

## 2021-06-08 PROCEDURE — 84484 ASSAY OF TROPONIN QUANT: CPT | Mod: 91 | Performed by: PHYSICIAN ASSISTANT

## 2021-06-08 PROCEDURE — 25000242 PHARM REV CODE 250 ALT 637 W/ HCPCS: Performed by: PHYSICIAN ASSISTANT

## 2021-06-08 PROCEDURE — 63600175 PHARM REV CODE 636 W HCPCS: Performed by: PHYSICIAN ASSISTANT

## 2021-06-08 PROCEDURE — 94761 N-INVAS EAR/PLS OXIMETRY MLT: CPT

## 2021-06-08 PROCEDURE — U0002 COVID-19 LAB TEST NON-CDC: HCPCS | Performed by: NURSE PRACTITIONER

## 2021-06-08 PROCEDURE — 94640 AIRWAY INHALATION TREATMENT: CPT

## 2021-06-08 PROCEDURE — 99285 EMERGENCY DEPT VISIT HI MDM: CPT | Mod: 25

## 2021-06-08 PROCEDURE — 84484 ASSAY OF TROPONIN QUANT: CPT | Performed by: NURSE PRACTITIONER

## 2021-06-08 PROCEDURE — 86703 HIV-1/HIV-2 1 RESULT ANTBDY: CPT | Performed by: EMERGENCY MEDICINE

## 2021-06-08 PROCEDURE — 85610 PROTHROMBIN TIME: CPT | Performed by: NURSE PRACTITIONER

## 2021-06-08 PROCEDURE — 86803 HEPATITIS C AB TEST: CPT | Performed by: EMERGENCY MEDICINE

## 2021-06-08 PROCEDURE — 93005 ELECTROCARDIOGRAM TRACING: CPT

## 2021-06-08 PROCEDURE — 93010 EKG 12-LEAD: ICD-10-PCS | Mod: ,,, | Performed by: INTERNAL MEDICINE

## 2021-06-08 PROCEDURE — 80053 COMPREHEN METABOLIC PANEL: CPT | Performed by: NURSE PRACTITIONER

## 2021-06-08 RX ORDER — METHYLPREDNISOLONE SOD SUCC 125 MG
125 VIAL (EA) INJECTION
Status: DISCONTINUED | OUTPATIENT
Start: 2021-06-08 | End: 2021-06-08

## 2021-06-08 RX ORDER — IPRATROPIUM BROMIDE AND ALBUTEROL SULFATE 2.5; .5 MG/3ML; MG/3ML
3 SOLUTION RESPIRATORY (INHALATION)
Status: COMPLETED | OUTPATIENT
Start: 2021-06-08 | End: 2021-06-08

## 2021-06-08 RX ORDER — PREDNISONE 20 MG/1
40 TABLET ORAL DAILY
Qty: 10 TABLET | Refills: 0 | Status: SHIPPED | OUTPATIENT
Start: 2021-06-08 | End: 2021-06-13

## 2021-06-08 RX ORDER — ALBUTEROL SULFATE 2.5 MG/.5ML
2.5 SOLUTION RESPIRATORY (INHALATION) EVERY 4 HOURS PRN
Qty: 1 EACH | Refills: 15 | Status: ON HOLD | OUTPATIENT
Start: 2021-06-08 | End: 2021-11-05 | Stop reason: SDUPTHER

## 2021-06-08 RX ORDER — PREDNISONE 20 MG/1
60 TABLET ORAL
Status: COMPLETED | OUTPATIENT
Start: 2021-06-08 | End: 2021-06-08

## 2021-06-08 RX ADMIN — IPRATROPIUM BROMIDE AND ALBUTEROL SULFATE 3 ML: .5; 3 SOLUTION RESPIRATORY (INHALATION) at 10:06

## 2021-06-08 RX ADMIN — PREDNISONE 60 MG: 20 TABLET ORAL at 11:06

## 2021-06-08 RX ADMIN — IPRATROPIUM BROMIDE AND ALBUTEROL SULFATE 3 ML: .5; 3 SOLUTION RESPIRATORY (INHALATION) at 11:06

## 2021-07-06 ENCOUNTER — TELEPHONE (OUTPATIENT)
Dept: PULMONOLOGY | Facility: CLINIC | Age: 51
End: 2021-07-06

## 2021-07-06 DIAGNOSIS — R06.02 SOB (SHORTNESS OF BREATH): Primary | ICD-10-CM

## 2021-07-07 ENCOUNTER — HOSPITAL ENCOUNTER (EMERGENCY)
Facility: OTHER | Age: 51
Discharge: HOME | End: 2021-07-07
Attending: EMERGENCY MEDICINE
Payer: MEDICAID

## 2021-07-07 ENCOUNTER — TELEPHONE (OUTPATIENT)
Dept: PULMONOLOGY | Facility: CLINIC | Age: 51
End: 2021-07-07

## 2021-07-07 VITALS
RESPIRATION RATE: 18 BRPM | HEART RATE: 78 BPM | BODY MASS INDEX: 46.99 KG/M2 | SYSTOLIC BLOOD PRESSURE: 104 MMHG | DIASTOLIC BLOOD PRESSURE: 52 MMHG | OXYGEN SATURATION: 100 % | WEIGHT: 293 LBS | TEMPERATURE: 98 F

## 2021-07-07 DIAGNOSIS — R07.9 CHEST PAIN: ICD-10-CM

## 2021-07-07 DIAGNOSIS — J44.1 COPD EXACERBATION: Primary | ICD-10-CM

## 2021-07-07 DIAGNOSIS — R06.02 SHORTNESS OF BREATH: ICD-10-CM

## 2021-07-07 LAB
ALBUMIN SERPL BCP-MCNC: 3.1 G/DL (ref 3.5–5.2)
ALP SERPL-CCNC: 97 U/L (ref 55–135)
ALT SERPL W/O P-5'-P-CCNC: 12 U/L (ref 10–44)
ANION GAP SERPL CALC-SCNC: 10 MMOL/L (ref 8–16)
AST SERPL-CCNC: 15 U/L (ref 10–40)
BASOPHILS # BLD AUTO: 0.02 K/UL (ref 0–0.2)
BASOPHILS NFR BLD: 0.4 % (ref 0–1.9)
BILIRUB SERPL-MCNC: 0.3 MG/DL (ref 0.1–1)
BNP SERPL-MCNC: 28 PG/ML (ref 0–99)
BUN SERPL-MCNC: 16 MG/DL (ref 6–20)
CALCIUM SERPL-MCNC: 9 MG/DL (ref 8.7–10.5)
CHLORIDE SERPL-SCNC: 106 MMOL/L (ref 95–110)
CO2 SERPL-SCNC: 23 MMOL/L (ref 23–29)
CREAT SERPL-MCNC: 1 MG/DL (ref 0.5–1.4)
DIFFERENTIAL METHOD: ABNORMAL
EOSINOPHIL # BLD AUTO: 0.2 K/UL (ref 0–0.5)
EOSINOPHIL NFR BLD: 4.9 % (ref 0–8)
ERYTHROCYTE [DISTWIDTH] IN BLOOD BY AUTOMATED COUNT: 14.1 % (ref 11.5–14.5)
EST. GFR  (AFRICAN AMERICAN): >60 ML/MIN/1.73 M^2
EST. GFR  (NON AFRICAN AMERICAN): >60 ML/MIN/1.73 M^2
GLUCOSE SERPL-MCNC: 85 MG/DL (ref 70–110)
HCT VFR BLD AUTO: 38.5 % (ref 37–48.5)
HGB BLD-MCNC: 11.6 G/DL (ref 12–16)
IMM GRANULOCYTES # BLD AUTO: 0.01 K/UL (ref 0–0.04)
IMM GRANULOCYTES NFR BLD AUTO: 0.2 % (ref 0–0.5)
INR PPP: 2.2 (ref 0.8–1.2)
LYMPHOCYTES # BLD AUTO: 1.3 K/UL (ref 1–4.8)
LYMPHOCYTES NFR BLD: 28.2 % (ref 18–48)
MCH RBC QN AUTO: 30.1 PG (ref 27–31)
MCHC RBC AUTO-ENTMCNC: 30.1 G/DL (ref 32–36)
MCV RBC AUTO: 100 FL (ref 82–98)
MONOCYTES # BLD AUTO: 0.4 K/UL (ref 0.3–1)
MONOCYTES NFR BLD: 9.2 % (ref 4–15)
NEUTROPHILS # BLD AUTO: 2.6 K/UL (ref 1.8–7.7)
NEUTROPHILS NFR BLD: 57.1 % (ref 38–73)
NRBC BLD-RTO: 0 /100 WBC
PLATELET # BLD AUTO: 239 K/UL (ref 150–450)
PLATELET BLD QL SMEAR: ABNORMAL
PMV BLD AUTO: 9.9 FL (ref 9.2–12.9)
POTASSIUM SERPL-SCNC: 4.1 MMOL/L (ref 3.5–5.1)
PROT SERPL-MCNC: 7.1 G/DL (ref 6–8.4)
PROTHROMBIN TIME: 23.1 SEC (ref 9–12.5)
RBC # BLD AUTO: 3.85 M/UL (ref 4–5.4)
SODIUM SERPL-SCNC: 139 MMOL/L (ref 136–145)
TROPONIN I SERPL DL<=0.01 NG/ML-MCNC: 0.01 NG/ML (ref 0–0.03)
WBC # BLD AUTO: 4.47 K/UL (ref 3.9–12.7)

## 2021-07-07 PROCEDURE — 96375 TX/PRO/DX INJ NEW DRUG ADDON: CPT

## 2021-07-07 PROCEDURE — 85610 PROTHROMBIN TIME: CPT | Performed by: EMERGENCY MEDICINE

## 2021-07-07 PROCEDURE — 96374 THER/PROPH/DIAG INJ IV PUSH: CPT

## 2021-07-07 PROCEDURE — 25000003 PHARM REV CODE 250: Performed by: EMERGENCY MEDICINE

## 2021-07-07 PROCEDURE — 94645 CONT INHLJ TX EACH ADDL HOUR: CPT

## 2021-07-07 PROCEDURE — 80053 COMPREHEN METABOLIC PANEL: CPT | Performed by: EMERGENCY MEDICINE

## 2021-07-07 PROCEDURE — 83880 ASSAY OF NATRIURETIC PEPTIDE: CPT | Performed by: EMERGENCY MEDICINE

## 2021-07-07 PROCEDURE — 94761 N-INVAS EAR/PLS OXIMETRY MLT: CPT

## 2021-07-07 PROCEDURE — 84484 ASSAY OF TROPONIN QUANT: CPT | Performed by: EMERGENCY MEDICINE

## 2021-07-07 PROCEDURE — 63600175 PHARM REV CODE 636 W HCPCS: Performed by: EMERGENCY MEDICINE

## 2021-07-07 PROCEDURE — 93010 EKG 12-LEAD: ICD-10-PCS | Mod: ,,, | Performed by: INTERNAL MEDICINE

## 2021-07-07 PROCEDURE — 93005 ELECTROCARDIOGRAM TRACING: CPT

## 2021-07-07 PROCEDURE — 25000242 PHARM REV CODE 250 ALT 637 W/ HCPCS: Performed by: EMERGENCY MEDICINE

## 2021-07-07 PROCEDURE — 85025 COMPLETE CBC W/AUTO DIFF WBC: CPT | Performed by: EMERGENCY MEDICINE

## 2021-07-07 PROCEDURE — 94644 CONT INHLJ TX 1ST HOUR: CPT

## 2021-07-07 PROCEDURE — 99285 EMERGENCY DEPT VISIT HI MDM: CPT | Mod: 25

## 2021-07-07 PROCEDURE — 93010 ELECTROCARDIOGRAM REPORT: CPT | Mod: ,,, | Performed by: INTERNAL MEDICINE

## 2021-07-07 RX ORDER — NAPROXEN SODIUM 220 MG/1
324 TABLET, FILM COATED ORAL
Status: COMPLETED | OUTPATIENT
Start: 2021-07-07 | End: 2021-07-07

## 2021-07-07 RX ORDER — IPRATROPIUM BROMIDE 0.5 MG/2.5ML
1 SOLUTION RESPIRATORY (INHALATION)
Status: COMPLETED | OUTPATIENT
Start: 2021-07-07 | End: 2021-07-07

## 2021-07-07 RX ORDER — ALBUTEROL SULFATE 2.5 MG/.5ML
15 SOLUTION RESPIRATORY (INHALATION)
Status: COMPLETED | OUTPATIENT
Start: 2021-07-07 | End: 2021-07-07

## 2021-07-07 RX ORDER — MORPHINE SULFATE 4 MG/ML
4 INJECTION, SOLUTION INTRAMUSCULAR; INTRAVENOUS
Status: COMPLETED | OUTPATIENT
Start: 2021-07-07 | End: 2021-07-07

## 2021-07-07 RX ORDER — ONDANSETRON 2 MG/ML
4 INJECTION INTRAMUSCULAR; INTRAVENOUS
Status: COMPLETED | OUTPATIENT
Start: 2021-07-07 | End: 2021-07-07

## 2021-07-07 RX ORDER — IPRATROPIUM BROMIDE AND ALBUTEROL SULFATE 2.5; .5 MG/3ML; MG/3ML
3 SOLUTION RESPIRATORY (INHALATION) EVERY 4 HOURS
Qty: 252 ML | Refills: 0 | Status: ON HOLD | OUTPATIENT
Start: 2021-07-07 | End: 2021-11-05 | Stop reason: HOSPADM

## 2021-07-07 RX ORDER — PREDNISONE 50 MG/1
50 TABLET ORAL DAILY
Qty: 5 TABLET | Refills: 0 | OUTPATIENT
Start: 2021-07-07 | End: 2021-09-10

## 2021-07-07 RX ORDER — METHYLPREDNISOLONE SOD SUCC 125 MG
125 VIAL (EA) INJECTION
Status: COMPLETED | OUTPATIENT
Start: 2021-07-07 | End: 2021-07-07

## 2021-07-07 RX ADMIN — IPRATROPIUM BROMIDE 1 MG: 0.5 SOLUTION RESPIRATORY (INHALATION) at 09:07

## 2021-07-07 RX ADMIN — METHYLPREDNISOLONE SODIUM SUCCINATE 125 MG: 125 INJECTION, POWDER, FOR SOLUTION INTRAMUSCULAR; INTRAVENOUS at 12:07

## 2021-07-07 RX ADMIN — MORPHINE SULFATE 4 MG: 4 INJECTION, SOLUTION INTRAMUSCULAR; INTRAVENOUS at 10:07

## 2021-07-07 RX ADMIN — ALBUTEROL SULFATE 15 MG: 2.5 SOLUTION RESPIRATORY (INHALATION) at 12:07

## 2021-07-07 RX ADMIN — ONDANSETRON 4 MG: 2 INJECTION INTRAMUSCULAR; INTRAVENOUS at 10:07

## 2021-07-07 RX ADMIN — ASPIRIN 81 MG CHEWABLE TABLET 324 MG: 81 TABLET CHEWABLE at 09:07

## 2021-07-07 RX ADMIN — ALBUTEROL SULFATE 15 MG: 2.5 SOLUTION RESPIRATORY (INHALATION) at 09:07

## 2021-07-07 RX ADMIN — IPRATROPIUM BROMIDE 1 MG: 0.5 SOLUTION RESPIRATORY (INHALATION) at 12:07

## 2021-07-14 ENCOUNTER — TELEPHONE (OUTPATIENT)
Dept: ADMINISTRATIVE | Facility: OTHER | Age: 51
End: 2021-07-14

## 2021-09-10 ENCOUNTER — HOSPITAL ENCOUNTER (EMERGENCY)
Facility: OTHER | Age: 51
Discharge: HOME | End: 2021-09-10
Attending: EMERGENCY MEDICINE
Payer: MEDICAID

## 2021-09-10 VITALS
HEART RATE: 77 BPM | SYSTOLIC BLOOD PRESSURE: 142 MMHG | RESPIRATION RATE: 18 BRPM | DIASTOLIC BLOOD PRESSURE: 64 MMHG | OXYGEN SATURATION: 98 % | TEMPERATURE: 99 F

## 2021-09-10 DIAGNOSIS — J44.1 COPD WITH ACUTE EXACERBATION: ICD-10-CM

## 2021-09-10 DIAGNOSIS — R07.9 CHEST PAIN: ICD-10-CM

## 2021-09-10 LAB
ALBUMIN SERPL BCP-MCNC: 3.4 G/DL (ref 3.5–5.2)
ALP SERPL-CCNC: 86 U/L (ref 55–135)
ALT SERPL W/O P-5'-P-CCNC: 13 U/L (ref 10–44)
ANION GAP SERPL CALC-SCNC: 6 MMOL/L (ref 8–16)
AST SERPL-CCNC: 16 U/L (ref 10–40)
BASOPHILS # BLD AUTO: 0.04 K/UL (ref 0–0.2)
BASOPHILS NFR BLD: 0.6 % (ref 0–1.9)
BILIRUB SERPL-MCNC: 0.3 MG/DL (ref 0.1–1)
BNP SERPL-MCNC: 74 PG/ML (ref 0–99)
BUN SERPL-MCNC: 11 MG/DL (ref 6–20)
CALCIUM SERPL-MCNC: 9.1 MG/DL (ref 8.7–10.5)
CHLORIDE SERPL-SCNC: 107 MMOL/L (ref 95–110)
CO2 SERPL-SCNC: 27 MMOL/L (ref 23–29)
CREAT SERPL-MCNC: 0.8 MG/DL (ref 0.5–1.4)
CTP QC/QA: YES
DIFFERENTIAL METHOD: ABNORMAL
EOSINOPHIL # BLD AUTO: 0.2 K/UL (ref 0–0.5)
EOSINOPHIL NFR BLD: 3.4 % (ref 0–8)
ERYTHROCYTE [DISTWIDTH] IN BLOOD BY AUTOMATED COUNT: 14.3 % (ref 11.5–14.5)
EST. GFR  (AFRICAN AMERICAN): >60 ML/MIN/1.73 M^2
EST. GFR  (NON AFRICAN AMERICAN): >60 ML/MIN/1.73 M^2
GLUCOSE SERPL-MCNC: 90 MG/DL (ref 70–110)
HCT VFR BLD AUTO: 34.3 % (ref 37–48.5)
HGB BLD-MCNC: 10.3 G/DL (ref 12–16)
IMM GRANULOCYTES # BLD AUTO: 0.02 K/UL (ref 0–0.04)
IMM GRANULOCYTES NFR BLD AUTO: 0.3 % (ref 0–0.5)
INR PPP: 2.2 (ref 0.8–1.2)
LYMPHOCYTES # BLD AUTO: 1.5 K/UL (ref 1–4.8)
LYMPHOCYTES NFR BLD: 22.7 % (ref 18–48)
MCH RBC QN AUTO: 29.1 PG (ref 27–31)
MCHC RBC AUTO-ENTMCNC: 30 G/DL (ref 32–36)
MCV RBC AUTO: 97 FL (ref 82–98)
MONOCYTES # BLD AUTO: 0.5 K/UL (ref 0.3–1)
MONOCYTES NFR BLD: 7.1 % (ref 4–15)
NEUTROPHILS # BLD AUTO: 4.3 K/UL (ref 1.8–7.7)
NEUTROPHILS NFR BLD: 65.9 % (ref 38–73)
NRBC BLD-RTO: 0 /100 WBC
PLATELET # BLD AUTO: 242 K/UL (ref 150–450)
PMV BLD AUTO: 9.9 FL (ref 9.2–12.9)
POTASSIUM SERPL-SCNC: 4.6 MMOL/L (ref 3.5–5.1)
PROT SERPL-MCNC: 7.5 G/DL (ref 6–8.4)
PROTHROMBIN TIME: 23 SEC (ref 9–12.5)
RBC # BLD AUTO: 3.54 M/UL (ref 4–5.4)
SARS-COV-2 RDRP RESP QL NAA+PROBE: NEGATIVE
SODIUM SERPL-SCNC: 140 MMOL/L (ref 136–145)
TROPONIN I SERPL DL<=0.01 NG/ML-MCNC: <0.006 NG/ML (ref 0–0.03)
WBC # BLD AUTO: 6.47 K/UL (ref 3.9–12.7)

## 2021-09-10 PROCEDURE — 94761 N-INVAS EAR/PLS OXIMETRY MLT: CPT

## 2021-09-10 PROCEDURE — 25000242 PHARM REV CODE 250 ALT 637 W/ HCPCS: Performed by: EMERGENCY MEDICINE

## 2021-09-10 PROCEDURE — U0002 COVID-19 LAB TEST NON-CDC: HCPCS | Performed by: EMERGENCY MEDICINE

## 2021-09-10 PROCEDURE — 85025 COMPLETE CBC W/AUTO DIFF WBC: CPT | Performed by: EMERGENCY MEDICINE

## 2021-09-10 PROCEDURE — 63600175 PHARM REV CODE 636 W HCPCS: Performed by: EMERGENCY MEDICINE

## 2021-09-10 PROCEDURE — 80053 COMPREHEN METABOLIC PANEL: CPT | Performed by: EMERGENCY MEDICINE

## 2021-09-10 PROCEDURE — 83880 ASSAY OF NATRIURETIC PEPTIDE: CPT | Performed by: EMERGENCY MEDICINE

## 2021-09-10 PROCEDURE — 94640 AIRWAY INHALATION TREATMENT: CPT

## 2021-09-10 PROCEDURE — 84484 ASSAY OF TROPONIN QUANT: CPT | Performed by: EMERGENCY MEDICINE

## 2021-09-10 PROCEDURE — 85610 PROTHROMBIN TIME: CPT | Performed by: EMERGENCY MEDICINE

## 2021-09-10 PROCEDURE — 96374 THER/PROPH/DIAG INJ IV PUSH: CPT

## 2021-09-10 PROCEDURE — 99285 EMERGENCY DEPT VISIT HI MDM: CPT | Mod: 25

## 2021-09-10 RX ORDER — IPRATROPIUM BROMIDE AND ALBUTEROL SULFATE 2.5; .5 MG/3ML; MG/3ML
3 SOLUTION RESPIRATORY (INHALATION)
Status: COMPLETED | OUTPATIENT
Start: 2021-09-10 | End: 2021-09-10

## 2021-09-10 RX ORDER — METHYLPREDNISOLONE SOD SUCC 125 MG
125 VIAL (EA) INJECTION
Status: COMPLETED | OUTPATIENT
Start: 2021-09-10 | End: 2021-09-10

## 2021-09-10 RX ORDER — PREDNISONE 50 MG/1
50 TABLET ORAL DAILY
Qty: 5 TABLET | Refills: 0 | Status: ON HOLD | OUTPATIENT
Start: 2021-09-10 | End: 2021-11-03

## 2021-09-10 RX ADMIN — IPRATROPIUM BROMIDE AND ALBUTEROL SULFATE 3 ML: .5; 3 SOLUTION RESPIRATORY (INHALATION) at 11:09

## 2021-09-10 RX ADMIN — METHYLPREDNISOLONE SODIUM SUCCINATE 125 MG: 125 INJECTION, POWDER, FOR SOLUTION INTRAMUSCULAR; INTRAVENOUS at 11:09

## 2021-11-02 ENCOUNTER — HOSPITAL ENCOUNTER (EMERGENCY)
Facility: OTHER | Age: 51
Discharge: HOME | End: 2021-11-02
Attending: EMERGENCY MEDICINE
Payer: MEDICAID

## 2021-11-02 VITALS
TEMPERATURE: 99 F | HEART RATE: 85 BPM | BODY MASS INDEX: 45.99 KG/M2 | WEIGHT: 293 LBS | SYSTOLIC BLOOD PRESSURE: 129 MMHG | RESPIRATION RATE: 16 BRPM | HEIGHT: 67 IN | OXYGEN SATURATION: 93 % | DIASTOLIC BLOOD PRESSURE: 59 MMHG

## 2021-11-02 DIAGNOSIS — J45.41 MODERATE PERSISTENT ASTHMA WITH EXACERBATION: Primary | ICD-10-CM

## 2021-11-02 DIAGNOSIS — R06.02 SOB (SHORTNESS OF BREATH): ICD-10-CM

## 2021-11-02 LAB
CTP QC/QA: YES
SARS-COV-2 RDRP RESP QL NAA+PROBE: NEGATIVE

## 2021-11-02 PROCEDURE — 94645 CONT INHLJ TX EACH ADDL HOUR: CPT

## 2021-11-02 PROCEDURE — 63600175 PHARM REV CODE 636 W HCPCS: Performed by: EMERGENCY MEDICINE

## 2021-11-02 PROCEDURE — 99285 EMERGENCY DEPT VISIT HI MDM: CPT | Mod: 25

## 2021-11-02 PROCEDURE — U0002 COVID-19 LAB TEST NON-CDC: HCPCS | Performed by: EMERGENCY MEDICINE

## 2021-11-02 PROCEDURE — 94640 AIRWAY INHALATION TREATMENT: CPT

## 2021-11-02 PROCEDURE — 93005 ELECTROCARDIOGRAM TRACING: CPT

## 2021-11-02 PROCEDURE — 94761 N-INVAS EAR/PLS OXIMETRY MLT: CPT

## 2021-11-02 PROCEDURE — 25000242 PHARM REV CODE 250 ALT 637 W/ HCPCS: Performed by: EMERGENCY MEDICINE

## 2021-11-02 RX ORDER — PREDNISONE 20 MG/1
60 TABLET ORAL
Status: COMPLETED | OUTPATIENT
Start: 2021-11-02 | End: 2021-11-02

## 2021-11-02 RX ORDER — ALBUTEROL SULFATE 2.5 MG/.5ML
10 SOLUTION RESPIRATORY (INHALATION)
Status: COMPLETED | OUTPATIENT
Start: 2021-11-02 | End: 2021-11-02

## 2021-11-02 RX ORDER — PREDNISONE 20 MG/1
40 TABLET ORAL DAILY
Qty: 6 TABLET | Refills: 0 | Status: ON HOLD | OUTPATIENT
Start: 2021-11-02 | End: 2021-11-03

## 2021-11-02 RX ORDER — ALBUTEROL SULFATE 90 UG/1
1-2 AEROSOL, METERED RESPIRATORY (INHALATION) EVERY 6 HOURS PRN
Qty: 8 G | Refills: 1 | Status: SHIPPED | OUTPATIENT
Start: 2021-11-02 | End: 2021-11-02 | Stop reason: SDUPTHER

## 2021-11-02 RX ORDER — IPRATROPIUM BROMIDE AND ALBUTEROL SULFATE 2.5; .5 MG/3ML; MG/3ML
3 SOLUTION RESPIRATORY (INHALATION)
Status: COMPLETED | OUTPATIENT
Start: 2021-11-02 | End: 2021-11-02

## 2021-11-02 RX ORDER — ALBUTEROL SULFATE 90 UG/1
1-2 AEROSOL, METERED RESPIRATORY (INHALATION) EVERY 6 HOURS PRN
Qty: 8 G | Refills: 1 | Status: ON HOLD | OUTPATIENT
Start: 2021-11-02 | End: 2021-11-05 | Stop reason: SDUPTHER

## 2021-11-02 RX ADMIN — IPRATROPIUM BROMIDE AND ALBUTEROL SULFATE 3 ML: 2.5; .5 SOLUTION RESPIRATORY (INHALATION) at 07:11

## 2021-11-02 RX ADMIN — ALBUTEROL SULFATE 10 MG: 2.5 SOLUTION RESPIRATORY (INHALATION) at 05:11

## 2021-11-02 RX ADMIN — IPRATROPIUM BROMIDE AND ALBUTEROL SULFATE 3 ML: 2.5; .5 SOLUTION RESPIRATORY (INHALATION) at 06:11

## 2021-11-02 RX ADMIN — PREDNISONE 60 MG: 20 TABLET ORAL at 05:11

## 2021-11-03 ENCOUNTER — HOSPITAL ENCOUNTER (OUTPATIENT)
Facility: OTHER | Age: 51
Discharge: HOME | End: 2021-11-05
Attending: EMERGENCY MEDICINE | Admitting: HOSPITALIST
Payer: MEDICAID

## 2021-11-03 DIAGNOSIS — J96.01 ACUTE HYPOXEMIC RESPIRATORY FAILURE: ICD-10-CM

## 2021-11-03 DIAGNOSIS — J44.1 COPD WITH ACUTE EXACERBATION: Primary | ICD-10-CM

## 2021-11-03 DIAGNOSIS — Z79.01 CHRONIC ANTICOAGULATION: ICD-10-CM

## 2021-11-03 DIAGNOSIS — J44.1 COPD EXACERBATION: ICD-10-CM

## 2021-11-03 DIAGNOSIS — E66.01 SEVERE OBESITY (BMI >= 40): ICD-10-CM

## 2021-11-03 DIAGNOSIS — Z95.2 S/P MVR (MITRAL VALVE REPLACEMENT): ICD-10-CM

## 2021-11-03 DIAGNOSIS — R07.9 CHEST PAIN: ICD-10-CM

## 2021-11-03 LAB
ALBUMIN SERPL BCP-MCNC: 3.2 G/DL (ref 3.5–5.2)
ALP SERPL-CCNC: 89 U/L (ref 55–135)
ALT SERPL W/O P-5'-P-CCNC: 22 U/L (ref 10–44)
ANION GAP SERPL CALC-SCNC: 11 MMOL/L (ref 8–16)
ANISOCYTOSIS BLD QL SMEAR: SLIGHT
AST SERPL-CCNC: 27 U/L (ref 10–40)
BASOPHILS # BLD AUTO: 0.01 K/UL (ref 0–0.2)
BASOPHILS NFR BLD: 0.1 % (ref 0–1.9)
BILIRUB SERPL-MCNC: 0.2 MG/DL (ref 0.1–1)
BNP SERPL-MCNC: 87 PG/ML (ref 0–99)
BUN SERPL-MCNC: 12 MG/DL (ref 6–20)
CALCIUM SERPL-MCNC: 9 MG/DL (ref 8.7–10.5)
CHLORIDE SERPL-SCNC: 107 MMOL/L (ref 95–110)
CO2 SERPL-SCNC: 24 MMOL/L (ref 23–29)
CREAT SERPL-MCNC: 0.8 MG/DL (ref 0.5–1.4)
CTP QC/QA: YES
DIFFERENTIAL METHOD: ABNORMAL
EOSINOPHIL # BLD AUTO: 0.1 K/UL (ref 0–0.5)
EOSINOPHIL NFR BLD: 0.8 % (ref 0–8)
ERYTHROCYTE [DISTWIDTH] IN BLOOD BY AUTOMATED COUNT: 15.3 % (ref 11.5–14.5)
EST. GFR  (AFRICAN AMERICAN): >60 ML/MIN/1.73 M^2
EST. GFR  (NON AFRICAN AMERICAN): >60 ML/MIN/1.73 M^2
ESTIMATED AVG GLUCOSE: 117 MG/DL (ref 68–131)
GLUCOSE SERPL-MCNC: 108 MG/DL (ref 70–110)
HBA1C MFR BLD: 5.7 % (ref 4–5.6)
HCT VFR BLD AUTO: 35.1 % (ref 37–48.5)
HGB BLD-MCNC: 10.1 G/DL (ref 12–16)
HYPOCHROMIA BLD QL SMEAR: ABNORMAL
IMM GRANULOCYTES # BLD AUTO: 0.02 K/UL (ref 0–0.04)
IMM GRANULOCYTES NFR BLD AUTO: 0.3 % (ref 0–0.5)
INR PPP: 2.8 (ref 0.8–1.2)
LYMPHOCYTES # BLD AUTO: 0.9 K/UL (ref 1–4.8)
LYMPHOCYTES NFR BLD: 11.5 % (ref 18–48)
MCH RBC QN AUTO: 27.5 PG (ref 27–31)
MCHC RBC AUTO-ENTMCNC: 28.8 G/DL (ref 32–36)
MCV RBC AUTO: 96 FL (ref 82–98)
MONOCYTES # BLD AUTO: 0.6 K/UL (ref 0.3–1)
MONOCYTES NFR BLD: 7.4 % (ref 4–15)
NEUTROPHILS # BLD AUTO: 5.9 K/UL (ref 1.8–7.7)
NEUTROPHILS NFR BLD: 79.9 % (ref 38–73)
NRBC BLD-RTO: 0 /100 WBC
PLATELET # BLD AUTO: 234 K/UL (ref 150–450)
PLATELET BLD QL SMEAR: ABNORMAL
PMV BLD AUTO: 9.8 FL (ref 9.2–12.9)
POC MOLECULAR INFLUENZA A AGN: NEGATIVE
POC MOLECULAR INFLUENZA B AGN: NEGATIVE
POCT GLUCOSE: 137 MG/DL (ref 70–110)
POCT GLUCOSE: 256 MG/DL (ref 70–110)
POTASSIUM SERPL-SCNC: 4.6 MMOL/L (ref 3.5–5.1)
PROT SERPL-MCNC: 7.2 G/DL (ref 6–8.4)
PROTHROMBIN TIME: 28.8 SEC (ref 9–12.5)
RBC # BLD AUTO: 3.67 M/UL (ref 4–5.4)
SODIUM SERPL-SCNC: 142 MMOL/L (ref 136–145)
TROPONIN I SERPL DL<=0.01 NG/ML-MCNC: 0.01 NG/ML (ref 0–0.03)
WBC # BLD AUTO: 7.42 K/UL (ref 3.9–12.7)

## 2021-11-03 PROCEDURE — 94761 N-INVAS EAR/PLS OXIMETRY MLT: CPT

## 2021-11-03 PROCEDURE — 94640 AIRWAY INHALATION TREATMENT: CPT

## 2021-11-03 PROCEDURE — 99900035 HC TECH TIME PER 15 MIN (STAT)

## 2021-11-03 PROCEDURE — 96374 THER/PROPH/DIAG INJ IV PUSH: CPT

## 2021-11-03 PROCEDURE — G0378 HOSPITAL OBSERVATION PER HR: HCPCS

## 2021-11-03 PROCEDURE — 87502 INFLUENZA DNA AMP PROBE: CPT

## 2021-11-03 PROCEDURE — 25000242 PHARM REV CODE 250 ALT 637 W/ HCPCS: Performed by: EMERGENCY MEDICINE

## 2021-11-03 PROCEDURE — 93005 ELECTROCARDIOGRAM TRACING: CPT

## 2021-11-03 PROCEDURE — 96376 TX/PRO/DX INJ SAME DRUG ADON: CPT

## 2021-11-03 PROCEDURE — 25000003 PHARM REV CODE 250: Performed by: PHYSICIAN ASSISTANT

## 2021-11-03 PROCEDURE — 94645 CONT INHLJ TX EACH ADDL HOUR: CPT

## 2021-11-03 PROCEDURE — 80053 COMPREHEN METABOLIC PANEL: CPT | Performed by: EMERGENCY MEDICINE

## 2021-11-03 PROCEDURE — 25000242 PHARM REV CODE 250 ALT 637 W/ HCPCS: Performed by: PHYSICIAN ASSISTANT

## 2021-11-03 PROCEDURE — 83036 HEMOGLOBIN GLYCOSYLATED A1C: CPT | Performed by: PHYSICIAN ASSISTANT

## 2021-11-03 PROCEDURE — 63600175 PHARM REV CODE 636 W HCPCS: Performed by: PHYSICIAN ASSISTANT

## 2021-11-03 PROCEDURE — 83880 ASSAY OF NATRIURETIC PEPTIDE: CPT | Performed by: EMERGENCY MEDICINE

## 2021-11-03 PROCEDURE — 85610 PROTHROMBIN TIME: CPT | Performed by: EMERGENCY MEDICINE

## 2021-11-03 PROCEDURE — 11000001 HC ACUTE MED/SURG PRIVATE ROOM

## 2021-11-03 PROCEDURE — 99285 EMERGENCY DEPT VISIT HI MDM: CPT | Mod: 25

## 2021-11-03 PROCEDURE — 99220 PR INITIAL OBSERVATION CARE,LEVL III: CPT | Mod: ,,, | Performed by: PHYSICIAN ASSISTANT

## 2021-11-03 PROCEDURE — 63600175 PHARM REV CODE 636 W HCPCS: Performed by: EMERGENCY MEDICINE

## 2021-11-03 PROCEDURE — 84484 ASSAY OF TROPONIN QUANT: CPT | Performed by: EMERGENCY MEDICINE

## 2021-11-03 PROCEDURE — 36415 COLL VENOUS BLD VENIPUNCTURE: CPT | Performed by: PHYSICIAN ASSISTANT

## 2021-11-03 PROCEDURE — 85025 COMPLETE CBC W/AUTO DIFF WBC: CPT | Performed by: EMERGENCY MEDICINE

## 2021-11-03 PROCEDURE — 27000221 HC OXYGEN, UP TO 24 HOURS

## 2021-11-03 PROCEDURE — 99220 PR INITIAL OBSERVATION CARE,LEVL III: ICD-10-PCS | Mod: ,,, | Performed by: PHYSICIAN ASSISTANT

## 2021-11-03 RX ORDER — PANTOPRAZOLE SODIUM 40 MG/1
40 TABLET, DELAYED RELEASE ORAL DAILY
Refills: 2 | Status: DISCONTINUED | OUTPATIENT
Start: 2021-11-03 | End: 2021-11-05 | Stop reason: HOSPADM

## 2021-11-03 RX ORDER — SODIUM CHLORIDE 0.9 % (FLUSH) 0.9 %
10 SYRINGE (ML) INJECTION
Status: DISCONTINUED | OUTPATIENT
Start: 2021-11-03 | End: 2021-11-05 | Stop reason: HOSPADM

## 2021-11-03 RX ORDER — INSULIN ASPART 100 [IU]/ML
0-5 INJECTION, SOLUTION INTRAVENOUS; SUBCUTANEOUS
Status: DISCONTINUED | OUTPATIENT
Start: 2021-11-03 | End: 2021-11-05 | Stop reason: HOSPADM

## 2021-11-03 RX ORDER — PREDNISONE 20 MG/1
40 TABLET ORAL DAILY
Status: DISCONTINUED | OUTPATIENT
Start: 2021-11-04 | End: 2021-11-05 | Stop reason: HOSPADM

## 2021-11-03 RX ORDER — FERROUS SULFATE 325(65) MG
325 TABLET ORAL
COMMUNITY
End: 2023-08-23

## 2021-11-03 RX ORDER — IBUPROFEN 200 MG
24 TABLET ORAL
Status: DISCONTINUED | OUTPATIENT
Start: 2021-11-03 | End: 2021-11-05 | Stop reason: HOSPADM

## 2021-11-03 RX ORDER — CARVEDILOL 6.25 MG/1
6.25 TABLET ORAL 2 TIMES DAILY WITH MEALS
Status: DISCONTINUED | OUTPATIENT
Start: 2021-11-03 | End: 2021-11-05 | Stop reason: HOSPADM

## 2021-11-03 RX ORDER — METHYLPREDNISOLONE SOD SUCC 125 MG
125 VIAL (EA) INJECTION
Status: COMPLETED | OUTPATIENT
Start: 2021-11-03 | End: 2021-11-03

## 2021-11-03 RX ORDER — ONDANSETRON 8 MG/1
8 TABLET, ORALLY DISINTEGRATING ORAL EVERY 8 HOURS PRN
Status: DISCONTINUED | OUTPATIENT
Start: 2021-11-03 | End: 2021-11-05 | Stop reason: HOSPADM

## 2021-11-03 RX ORDER — LORATADINE 10 MG/1
10 TABLET ORAL DAILY
Status: ON HOLD | COMMUNITY
Start: 2021-10-18 | End: 2023-10-31 | Stop reason: HOSPADM

## 2021-11-03 RX ORDER — FUROSEMIDE 40 MG/1
40 TABLET ORAL DAILY
Status: ON HOLD | COMMUNITY
Start: 2021-09-10 | End: 2023-10-31 | Stop reason: SDUPTHER

## 2021-11-03 RX ORDER — IBUPROFEN 200 MG
16 TABLET ORAL
Status: DISCONTINUED | OUTPATIENT
Start: 2021-11-03 | End: 2021-11-05 | Stop reason: HOSPADM

## 2021-11-03 RX ORDER — IPRATROPIUM BROMIDE AND ALBUTEROL SULFATE 2.5; .5 MG/3ML; MG/3ML
3 SOLUTION RESPIRATORY (INHALATION) EVERY 4 HOURS
Status: DISCONTINUED | OUTPATIENT
Start: 2021-11-03 | End: 2021-11-04

## 2021-11-03 RX ORDER — ATORVASTATIN CALCIUM 20 MG/1
40 TABLET, FILM COATED ORAL NIGHTLY
Status: DISCONTINUED | OUTPATIENT
Start: 2021-11-03 | End: 2021-11-05 | Stop reason: HOSPADM

## 2021-11-03 RX ORDER — WARFARIN SODIUM 5 MG/1
5 TABLET ORAL
Status: DISCONTINUED | OUTPATIENT
Start: 2021-11-05 | End: 2021-11-05 | Stop reason: HOSPADM

## 2021-11-03 RX ORDER — TALC
6 POWDER (GRAM) TOPICAL NIGHTLY PRN
Status: DISCONTINUED | OUTPATIENT
Start: 2021-11-03 | End: 2021-11-05 | Stop reason: HOSPADM

## 2021-11-03 RX ORDER — CETIRIZINE HYDROCHLORIDE 10 MG/1
10 TABLET ORAL DAILY
Status: DISCONTINUED | OUTPATIENT
Start: 2021-11-03 | End: 2021-11-05 | Stop reason: HOSPADM

## 2021-11-03 RX ORDER — IPRATROPIUM BROMIDE AND ALBUTEROL SULFATE 2.5; .5 MG/3ML; MG/3ML
3 SOLUTION RESPIRATORY (INHALATION)
Status: COMPLETED | OUTPATIENT
Start: 2021-11-03 | End: 2021-11-03

## 2021-11-03 RX ORDER — FUROSEMIDE 40 MG/1
40 TABLET ORAL DAILY
Status: DISCONTINUED | OUTPATIENT
Start: 2021-11-03 | End: 2021-11-05 | Stop reason: HOSPADM

## 2021-11-03 RX ORDER — POLYETHYLENE GLYCOL 3350 17 G/17G
17 POWDER, FOR SOLUTION ORAL DAILY PRN
Status: DISCONTINUED | OUTPATIENT
Start: 2021-11-03 | End: 2021-11-05 | Stop reason: HOSPADM

## 2021-11-03 RX ORDER — IPRATROPIUM BROMIDE AND ALBUTEROL SULFATE 2.5; .5 MG/3ML; MG/3ML
3 SOLUTION RESPIRATORY (INHALATION)
Status: DISCONTINUED | OUTPATIENT
Start: 2021-11-03 | End: 2021-11-03

## 2021-11-03 RX ORDER — GLUCAGON 1 MG
1 KIT INJECTION
Status: DISCONTINUED | OUTPATIENT
Start: 2021-11-03 | End: 2021-11-05 | Stop reason: HOSPADM

## 2021-11-03 RX ORDER — METHYLPREDNISOLONE SOD SUCC 125 MG
80 VIAL (EA) INJECTION EVERY 8 HOURS
Status: COMPLETED | OUTPATIENT
Start: 2021-11-03 | End: 2021-11-03

## 2021-11-03 RX ORDER — ACETAMINOPHEN 325 MG/1
650 TABLET ORAL EVERY 4 HOURS PRN
Status: DISCONTINUED | OUTPATIENT
Start: 2021-11-03 | End: 2021-11-05 | Stop reason: HOSPADM

## 2021-11-03 RX ORDER — CARVEDILOL 6.25 MG/1
6.25 TABLET ORAL 2 TIMES DAILY WITH MEALS
Status: DISCONTINUED | OUTPATIENT
Start: 2021-11-03 | End: 2021-11-03

## 2021-11-03 RX ORDER — WARFARIN SODIUM 5 MG/1
5 TABLET ORAL
Status: DISCONTINUED | OUTPATIENT
Start: 2021-11-06 | End: 2021-11-05 | Stop reason: HOSPADM

## 2021-11-03 RX ADMIN — ATORVASTATIN CALCIUM 40 MG: 20 TABLET, FILM COATED ORAL at 09:11

## 2021-11-03 RX ADMIN — IPRATROPIUM BROMIDE AND ALBUTEROL SULFATE 3 ML: .5; 3 SOLUTION RESPIRATORY (INHALATION) at 08:11

## 2021-11-03 RX ADMIN — IPRATROPIUM BROMIDE AND ALBUTEROL SULFATE 3 ML: .5; 3 SOLUTION RESPIRATORY (INHALATION) at 04:11

## 2021-11-03 RX ADMIN — IPRATROPIUM BROMIDE AND ALBUTEROL SULFATE 3 ML: 2.5; .5 SOLUTION RESPIRATORY (INHALATION) at 05:11

## 2021-11-03 RX ADMIN — IPRATROPIUM BROMIDE AND ALBUTEROL SULFATE 3 ML: .5; 3 SOLUTION RESPIRATORY (INHALATION) at 12:11

## 2021-11-03 RX ADMIN — WARFARIN SODIUM 7.5 MG: 5 TABLET ORAL at 04:11

## 2021-11-03 RX ADMIN — FUROSEMIDE 40 MG: 40 TABLET ORAL at 11:11

## 2021-11-03 RX ADMIN — METHYLPREDNISOLONE SODIUM SUCCINATE 80 MG: 125 INJECTION, POWDER, LYOPHILIZED, FOR SOLUTION INTRAMUSCULAR; INTRAVENOUS at 09:11

## 2021-11-03 RX ADMIN — PANTOPRAZOLE SODIUM 40 MG: 40 TABLET, DELAYED RELEASE ORAL at 11:11

## 2021-11-03 RX ADMIN — IPRATROPIUM BROMIDE AND ALBUTEROL SULFATE 3 ML: 2.5; .5 SOLUTION RESPIRATORY (INHALATION) at 08:11

## 2021-11-03 RX ADMIN — METHYLPREDNISOLONE SODIUM SUCCINATE 80 MG: 125 INJECTION, POWDER, LYOPHILIZED, FOR SOLUTION INTRAMUSCULAR; INTRAVENOUS at 01:11

## 2021-11-03 RX ADMIN — METHYLPREDNISOLONE SODIUM SUCCINATE 125 MG: 125 INJECTION, POWDER, LYOPHILIZED, FOR SOLUTION INTRAMUSCULAR; INTRAVENOUS at 05:11

## 2021-11-03 RX ADMIN — CETIRIZINE HYDROCHLORIDE 10 MG: 10 TABLET, FILM COATED ORAL at 11:11

## 2021-11-03 RX ADMIN — CARVEDILOL 6.25 MG: 6.25 TABLET, FILM COATED ORAL at 11:11

## 2021-11-03 RX ADMIN — MELATONIN TAB 3 MG 6 MG: 3 TAB at 09:11

## 2021-11-04 LAB
ANION GAP SERPL CALC-SCNC: 12 MMOL/L (ref 8–16)
BASOPHILS # BLD AUTO: 0 K/UL (ref 0–0.2)
BASOPHILS NFR BLD: 0 % (ref 0–1.9)
BUN SERPL-MCNC: 16 MG/DL (ref 6–20)
CALCIUM SERPL-MCNC: 9.9 MG/DL (ref 8.7–10.5)
CHLORIDE SERPL-SCNC: 103 MMOL/L (ref 95–110)
CO2 SERPL-SCNC: 24 MMOL/L (ref 23–29)
CREAT SERPL-MCNC: 1 MG/DL (ref 0.5–1.4)
DIFFERENTIAL METHOD: ABNORMAL
EOSINOPHIL # BLD AUTO: 0 K/UL (ref 0–0.5)
EOSINOPHIL NFR BLD: 0 % (ref 0–8)
ERYTHROCYTE [DISTWIDTH] IN BLOOD BY AUTOMATED COUNT: 15.3 % (ref 11.5–14.5)
EST. GFR  (AFRICAN AMERICAN): >60 ML/MIN/1.73 M^2
EST. GFR  (NON AFRICAN AMERICAN): >60 ML/MIN/1.73 M^2
GLUCOSE SERPL-MCNC: 152 MG/DL (ref 70–110)
HCT VFR BLD AUTO: 37.6 % (ref 37–48.5)
HGB BLD-MCNC: 10.8 G/DL (ref 12–16)
IMM GRANULOCYTES # BLD AUTO: 0.02 K/UL (ref 0–0.04)
IMM GRANULOCYTES NFR BLD AUTO: 0.3 % (ref 0–0.5)
INR PPP: 4 (ref 0.8–1.2)
LYMPHOCYTES # BLD AUTO: 0.9 K/UL (ref 1–4.8)
LYMPHOCYTES NFR BLD: 12.1 % (ref 18–48)
MAGNESIUM SERPL-MCNC: 2 MG/DL (ref 1.6–2.6)
MCH RBC QN AUTO: 27.5 PG (ref 27–31)
MCHC RBC AUTO-ENTMCNC: 28.7 G/DL (ref 32–36)
MCV RBC AUTO: 96 FL (ref 82–98)
MONOCYTES # BLD AUTO: 0.4 K/UL (ref 0.3–1)
MONOCYTES NFR BLD: 6.2 % (ref 4–15)
NEUTROPHILS # BLD AUTO: 5.7 K/UL (ref 1.8–7.7)
NEUTROPHILS NFR BLD: 81.4 % (ref 38–73)
NRBC BLD-RTO: 0 /100 WBC
PHOSPHATE SERPL-MCNC: 3.3 MG/DL (ref 2.7–4.5)
PLATELET # BLD AUTO: 266 K/UL (ref 150–450)
PMV BLD AUTO: 9.7 FL (ref 9.2–12.9)
POCT GLUCOSE: 139 MG/DL (ref 70–110)
POCT GLUCOSE: 143 MG/DL (ref 70–110)
POCT GLUCOSE: 150 MG/DL (ref 70–110)
POCT GLUCOSE: 278 MG/DL (ref 70–110)
POTASSIUM SERPL-SCNC: 4.5 MMOL/L (ref 3.5–5.1)
PROTHROMBIN TIME: 39.8 SEC (ref 9–12.5)
RBC # BLD AUTO: 3.93 M/UL (ref 4–5.4)
SODIUM SERPL-SCNC: 139 MMOL/L (ref 136–145)
WBC # BLD AUTO: 7.05 K/UL (ref 3.9–12.7)

## 2021-11-04 PROCEDURE — 85610 PROTHROMBIN TIME: CPT | Performed by: PHYSICIAN ASSISTANT

## 2021-11-04 PROCEDURE — 85025 COMPLETE CBC W/AUTO DIFF WBC: CPT | Performed by: PHYSICIAN ASSISTANT

## 2021-11-04 PROCEDURE — 27000221 HC OXYGEN, UP TO 24 HOURS

## 2021-11-04 PROCEDURE — 94640 AIRWAY INHALATION TREATMENT: CPT

## 2021-11-04 PROCEDURE — 25000242 PHARM REV CODE 250 ALT 637 W/ HCPCS: Performed by: PHYSICIAN ASSISTANT

## 2021-11-04 PROCEDURE — 99226 PR SUBSEQUENT OBSERVATION CARE,LEVEL III: CPT | Mod: ,,, | Performed by: PHYSICIAN ASSISTANT

## 2021-11-04 PROCEDURE — 99900035 HC TECH TIME PER 15 MIN (STAT)

## 2021-11-04 PROCEDURE — 83735 ASSAY OF MAGNESIUM: CPT | Performed by: PHYSICIAN ASSISTANT

## 2021-11-04 PROCEDURE — 80048 BASIC METABOLIC PNL TOTAL CA: CPT | Performed by: PHYSICIAN ASSISTANT

## 2021-11-04 PROCEDURE — G0378 HOSPITAL OBSERVATION PER HR: HCPCS

## 2021-11-04 PROCEDURE — 94761 N-INVAS EAR/PLS OXIMETRY MLT: CPT

## 2021-11-04 PROCEDURE — 63600175 PHARM REV CODE 636 W HCPCS: Performed by: PHYSICIAN ASSISTANT

## 2021-11-04 PROCEDURE — 84100 ASSAY OF PHOSPHORUS: CPT | Performed by: PHYSICIAN ASSISTANT

## 2021-11-04 PROCEDURE — 25000003 PHARM REV CODE 250: Performed by: PHYSICIAN ASSISTANT

## 2021-11-04 PROCEDURE — 36415 COLL VENOUS BLD VENIPUNCTURE: CPT | Performed by: PHYSICIAN ASSISTANT

## 2021-11-04 PROCEDURE — 99226 PR SUBSEQUENT OBSERVATION CARE,LEVEL III: ICD-10-PCS | Mod: ,,, | Performed by: PHYSICIAN ASSISTANT

## 2021-11-04 RX ORDER — IPRATROPIUM BROMIDE AND ALBUTEROL SULFATE 2.5; .5 MG/3ML; MG/3ML
3 SOLUTION RESPIRATORY (INHALATION)
Status: DISCONTINUED | OUTPATIENT
Start: 2021-11-04 | End: 2021-11-05 | Stop reason: HOSPADM

## 2021-11-04 RX ADMIN — CARVEDILOL 6.25 MG: 6.25 TABLET, FILM COATED ORAL at 08:11

## 2021-11-04 RX ADMIN — CARVEDILOL 6.25 MG: 6.25 TABLET, FILM COATED ORAL at 06:11

## 2021-11-04 RX ADMIN — INSULIN ASPART 3 UNITS: 100 INJECTION, SOLUTION INTRAVENOUS; SUBCUTANEOUS at 01:11

## 2021-11-04 RX ADMIN — IPRATROPIUM BROMIDE AND ALBUTEROL SULFATE 3 ML: .5; 3 SOLUTION RESPIRATORY (INHALATION) at 07:11

## 2021-11-04 RX ADMIN — PANTOPRAZOLE SODIUM 40 MG: 40 TABLET, DELAYED RELEASE ORAL at 08:11

## 2021-11-04 RX ADMIN — IPRATROPIUM BROMIDE AND ALBUTEROL SULFATE 3 ML: .5; 3 SOLUTION RESPIRATORY (INHALATION) at 11:11

## 2021-11-04 RX ADMIN — PREDNISONE 40 MG: 20 TABLET ORAL at 08:11

## 2021-11-04 RX ADMIN — FUROSEMIDE 40 MG: 40 TABLET ORAL at 08:11

## 2021-11-04 RX ADMIN — IPRATROPIUM BROMIDE AND ALBUTEROL SULFATE 3 ML: .5; 3 SOLUTION RESPIRATORY (INHALATION) at 04:11

## 2021-11-04 RX ADMIN — ATORVASTATIN CALCIUM 40 MG: 20 TABLET, FILM COATED ORAL at 09:11

## 2021-11-04 RX ADMIN — CETIRIZINE HYDROCHLORIDE 10 MG: 10 TABLET, FILM COATED ORAL at 08:11

## 2021-11-04 RX ADMIN — IPRATROPIUM BROMIDE AND ALBUTEROL SULFATE 3 ML: .5; 3 SOLUTION RESPIRATORY (INHALATION) at 12:11

## 2021-11-05 VITALS
DIASTOLIC BLOOD PRESSURE: 87 MMHG | TEMPERATURE: 100 F | HEIGHT: 68 IN | HEART RATE: 92 BPM | BODY MASS INDEX: 44.41 KG/M2 | SYSTOLIC BLOOD PRESSURE: 124 MMHG | OXYGEN SATURATION: 94 % | WEIGHT: 293 LBS | RESPIRATION RATE: 16 BRPM

## 2021-11-05 PROBLEM — J44.1 COPD EXACERBATION: Status: RESOLVED | Noted: 2021-11-03 | Resolved: 2021-11-05

## 2021-11-05 PROBLEM — J96.01 ACUTE HYPOXEMIC RESPIRATORY FAILURE: Status: RESOLVED | Noted: 2021-11-03 | Resolved: 2021-11-05

## 2021-11-05 LAB
ANION GAP SERPL CALC-SCNC: 7 MMOL/L (ref 8–16)
BUN SERPL-MCNC: 22 MG/DL (ref 6–20)
CALCIUM SERPL-MCNC: 9.1 MG/DL (ref 8.7–10.5)
CHLORIDE SERPL-SCNC: 103 MMOL/L (ref 95–110)
CO2 SERPL-SCNC: 31 MMOL/L (ref 23–29)
CREAT SERPL-MCNC: 1 MG/DL (ref 0.5–1.4)
EST. GFR  (AFRICAN AMERICAN): >60 ML/MIN/1.73 M^2
EST. GFR  (NON AFRICAN AMERICAN): >60 ML/MIN/1.73 M^2
GLUCOSE SERPL-MCNC: 104 MG/DL (ref 70–110)
INR PPP: 3.4 (ref 0.8–1.2)
POCT GLUCOSE: 133 MG/DL (ref 70–110)
POCT GLUCOSE: 194 MG/DL (ref 70–110)
POCT GLUCOSE: 98 MG/DL (ref 70–110)
POTASSIUM SERPL-SCNC: 4.1 MMOL/L (ref 3.5–5.1)
PROTHROMBIN TIME: 34.3 SEC (ref 9–12.5)
SODIUM SERPL-SCNC: 141 MMOL/L (ref 136–145)

## 2021-11-05 PROCEDURE — 99225 PR SUBSEQUENT OBSERVATION CARE,LEVEL II: ICD-10-PCS | Mod: ,,, | Performed by: PHYSICIAN ASSISTANT

## 2021-11-05 PROCEDURE — 94761 N-INVAS EAR/PLS OXIMETRY MLT: CPT

## 2021-11-05 PROCEDURE — 85610 PROTHROMBIN TIME: CPT | Performed by: PHYSICIAN ASSISTANT

## 2021-11-05 PROCEDURE — G0378 HOSPITAL OBSERVATION PER HR: HCPCS

## 2021-11-05 PROCEDURE — 27000221 HC OXYGEN, UP TO 24 HOURS

## 2021-11-05 PROCEDURE — 94640 AIRWAY INHALATION TREATMENT: CPT

## 2021-11-05 PROCEDURE — 25000003 PHARM REV CODE 250: Performed by: PHYSICIAN ASSISTANT

## 2021-11-05 PROCEDURE — 99225 PR SUBSEQUENT OBSERVATION CARE,LEVEL II: CPT | Mod: ,,, | Performed by: PHYSICIAN ASSISTANT

## 2021-11-05 PROCEDURE — 25000242 PHARM REV CODE 250 ALT 637 W/ HCPCS: Performed by: PHYSICIAN ASSISTANT

## 2021-11-05 PROCEDURE — 36415 COLL VENOUS BLD VENIPUNCTURE: CPT | Performed by: PHYSICIAN ASSISTANT

## 2021-11-05 PROCEDURE — 80048 BASIC METABOLIC PNL TOTAL CA: CPT | Performed by: PHYSICIAN ASSISTANT

## 2021-11-05 PROCEDURE — 63600175 PHARM REV CODE 636 W HCPCS: Performed by: PHYSICIAN ASSISTANT

## 2021-11-05 RX ORDER — PREDNISONE 20 MG/1
40 TABLET ORAL DAILY
Qty: 6 TABLET | Refills: 0 | Status: SHIPPED | OUTPATIENT
Start: 2021-11-06 | End: 2021-11-09

## 2021-11-05 RX ORDER — OMEPRAZOLE 20 MG/1
20 CAPSULE, DELAYED RELEASE ORAL 2 TIMES DAILY
Qty: 60 CAPSULE | Refills: 0 | Status: ON HOLD | OUTPATIENT
Start: 2021-11-05 | End: 2023-08-24 | Stop reason: HOSPADM

## 2021-11-05 RX ORDER — LANCETS 33 GAUGE
EACH MISCELLANEOUS DAILY
Qty: 100 EACH | Refills: 0 | Status: ON HOLD | OUTPATIENT
Start: 2021-11-05 | End: 2023-10-31 | Stop reason: HOSPADM

## 2021-11-05 RX ORDER — ALBUTEROL SULFATE 2.5 MG/.5ML
2.5 SOLUTION RESPIRATORY (INHALATION) EVERY 4 HOURS PRN
Qty: 30 EACH | Refills: 0 | Status: ON HOLD | OUTPATIENT
Start: 2021-11-05 | End: 2022-12-16

## 2021-11-05 RX ORDER — DEXTROSE 4 G
TABLET,CHEWABLE ORAL
Qty: 1 EACH | Refills: 0 | Status: ON HOLD | OUTPATIENT
Start: 2021-11-05 | End: 2023-08-24 | Stop reason: HOSPADM

## 2021-11-05 RX ORDER — ALBUTEROL SULFATE 90 UG/1
1-2 AEROSOL, METERED RESPIRATORY (INHALATION) EVERY 6 HOURS PRN
Qty: 8.5 G | Refills: 0 | Status: SHIPPED | OUTPATIENT
Start: 2021-11-05 | End: 2022-11-05

## 2021-11-05 RX ADMIN — CARVEDILOL 6.25 MG: 6.25 TABLET, FILM COATED ORAL at 08:11

## 2021-11-05 RX ADMIN — IPRATROPIUM BROMIDE AND ALBUTEROL SULFATE 3 ML: .5; 3 SOLUTION RESPIRATORY (INHALATION) at 11:11

## 2021-11-05 RX ADMIN — PANTOPRAZOLE SODIUM 40 MG: 40 TABLET, DELAYED RELEASE ORAL at 08:11

## 2021-11-05 RX ADMIN — IPRATROPIUM BROMIDE AND ALBUTEROL SULFATE 3 ML: .5; 3 SOLUTION RESPIRATORY (INHALATION) at 07:11

## 2021-11-05 RX ADMIN — FUROSEMIDE 40 MG: 40 TABLET ORAL at 08:11

## 2021-11-05 RX ADMIN — IPRATROPIUM BROMIDE AND ALBUTEROL SULFATE 3 ML: .5; 3 SOLUTION RESPIRATORY (INHALATION) at 02:11

## 2021-11-05 RX ADMIN — CETIRIZINE HYDROCHLORIDE 10 MG: 10 TABLET, FILM COATED ORAL at 08:11

## 2021-11-05 RX ADMIN — PREDNISONE 40 MG: 20 TABLET ORAL at 08:11

## 2022-01-07 ENCOUNTER — HOSPITAL ENCOUNTER (EMERGENCY)
Facility: OTHER | Age: 52
Discharge: HOME | End: 2022-01-07
Attending: EMERGENCY MEDICINE
Payer: MEDICAID

## 2022-01-07 VITALS
WEIGHT: 293 LBS | BODY MASS INDEX: 47.09 KG/M2 | HEART RATE: 88 BPM | RESPIRATION RATE: 18 BRPM | TEMPERATURE: 98 F | OXYGEN SATURATION: 95 % | HEIGHT: 66 IN | DIASTOLIC BLOOD PRESSURE: 56 MMHG | SYSTOLIC BLOOD PRESSURE: 117 MMHG

## 2022-01-07 DIAGNOSIS — R06.02 SHORTNESS OF BREATH: ICD-10-CM

## 2022-01-07 DIAGNOSIS — U07.1 COVID-19: Primary | ICD-10-CM

## 2022-01-07 LAB
ALBUMIN SERPL BCP-MCNC: 3.3 G/DL (ref 3.5–5.2)
ALP SERPL-CCNC: 75 U/L (ref 55–135)
ALT SERPL W/O P-5'-P-CCNC: 13 U/L (ref 10–44)
ANION GAP SERPL CALC-SCNC: 15 MMOL/L (ref 8–16)
AST SERPL-CCNC: 31 U/L (ref 10–40)
BASOPHILS # BLD AUTO: 0.01 K/UL (ref 0–0.2)
BASOPHILS NFR BLD: 0.2 % (ref 0–1.9)
BILIRUB SERPL-MCNC: 0.4 MG/DL (ref 0.1–1)
BUN SERPL-MCNC: 24 MG/DL (ref 6–20)
CALCIUM SERPL-MCNC: 8.6 MG/DL (ref 8.7–10.5)
CHLORIDE SERPL-SCNC: 97 MMOL/L (ref 95–110)
CK SERPL-CCNC: 215 U/L (ref 20–180)
CO2 SERPL-SCNC: 25 MMOL/L (ref 23–29)
CREAT SERPL-MCNC: 1.2 MG/DL (ref 0.5–1.4)
CRP SERPL-MCNC: 53.2 MG/L (ref 0–8.2)
CTP QC/QA: YES
DIFFERENTIAL METHOD: ABNORMAL
EOSINOPHIL # BLD AUTO: 0 K/UL (ref 0–0.5)
EOSINOPHIL NFR BLD: 0.2 % (ref 0–8)
ERYTHROCYTE [DISTWIDTH] IN BLOOD BY AUTOMATED COUNT: 15.5 % (ref 11.5–14.5)
EST. GFR  (AFRICAN AMERICAN): >60 ML/MIN/1.73 M^2
EST. GFR  (NON AFRICAN AMERICAN): 53 ML/MIN/1.73 M^2
FERRITIN SERPL-MCNC: 224 NG/ML (ref 20–300)
GLUCOSE SERPL-MCNC: 92 MG/DL (ref 70–110)
HCT VFR BLD AUTO: 36.1 % (ref 37–48.5)
HCV AB SERPL QL IA: NEGATIVE
HGB BLD-MCNC: 10.6 G/DL (ref 12–16)
HIV 1+2 AB+HIV1 P24 AG SERPL QL IA: NEGATIVE
IMM GRANULOCYTES # BLD AUTO: 0.01 K/UL (ref 0–0.04)
IMM GRANULOCYTES NFR BLD AUTO: 0.2 % (ref 0–0.5)
INR PPP: 6.4 (ref 0.8–1.2)
LACTATE SERPL-SCNC: 1.2 MMOL/L (ref 0.5–2.2)
LDH SERPL L TO P-CCNC: 544 U/L (ref 110–260)
LYMPHOCYTES # BLD AUTO: 1.1 K/UL (ref 1–4.8)
LYMPHOCYTES NFR BLD: 25 % (ref 18–48)
MCH RBC QN AUTO: 26.2 PG (ref 27–31)
MCHC RBC AUTO-ENTMCNC: 29.4 G/DL (ref 32–36)
MCV RBC AUTO: 89 FL (ref 82–98)
MONOCYTES # BLD AUTO: 0.3 K/UL (ref 0.3–1)
MONOCYTES NFR BLD: 6.9 % (ref 4–15)
NEUTROPHILS # BLD AUTO: 2.8 K/UL (ref 1.8–7.7)
NEUTROPHILS NFR BLD: 67.5 % (ref 38–73)
NRBC BLD-RTO: 0 /100 WBC
PLATELET # BLD AUTO: 193 K/UL (ref 150–450)
PMV BLD AUTO: 10.7 FL (ref 9.2–12.9)
POTASSIUM SERPL-SCNC: 3.4 MMOL/L (ref 3.5–5.1)
PROT SERPL-MCNC: 7.3 G/DL (ref 6–8.4)
PROTHROMBIN TIME: 62.7 SEC (ref 9–12.5)
RBC # BLD AUTO: 4.04 M/UL (ref 4–5.4)
SARS-COV-2 RDRP RESP QL NAA+PROBE: POSITIVE
SODIUM SERPL-SCNC: 137 MMOL/L (ref 136–145)
TROPONIN I SERPL DL<=0.01 NG/ML-MCNC: 0.01 NG/ML (ref 0–0.03)
WBC # BLD AUTO: 4.2 K/UL (ref 3.9–12.7)

## 2022-01-07 PROCEDURE — 87389 HIV-1 AG W/HIV-1&-2 AB AG IA: CPT | Performed by: EMERGENCY MEDICINE

## 2022-01-07 PROCEDURE — 93010 EKG 12-LEAD: ICD-10-PCS | Mod: ,,, | Performed by: INTERNAL MEDICINE

## 2022-01-07 PROCEDURE — 82550 ASSAY OF CK (CPK): CPT | Performed by: NURSE PRACTITIONER

## 2022-01-07 PROCEDURE — 25000003 PHARM REV CODE 250: Performed by: NURSE PRACTITIONER

## 2022-01-07 PROCEDURE — 93010 ELECTROCARDIOGRAM REPORT: CPT | Mod: ,,, | Performed by: INTERNAL MEDICINE

## 2022-01-07 PROCEDURE — 93005 ELECTROCARDIOGRAM TRACING: CPT

## 2022-01-07 PROCEDURE — 86140 C-REACTIVE PROTEIN: CPT | Performed by: NURSE PRACTITIONER

## 2022-01-07 PROCEDURE — 86803 HEPATITIS C AB TEST: CPT | Performed by: EMERGENCY MEDICINE

## 2022-01-07 PROCEDURE — 96374 THER/PROPH/DIAG INJ IV PUSH: CPT

## 2022-01-07 PROCEDURE — 99285 EMERGENCY DEPT VISIT HI MDM: CPT | Mod: 25

## 2022-01-07 PROCEDURE — 63600175 PHARM REV CODE 636 W HCPCS: Performed by: NURSE PRACTITIONER

## 2022-01-07 PROCEDURE — 85025 COMPLETE CBC W/AUTO DIFF WBC: CPT | Performed by: NURSE PRACTITIONER

## 2022-01-07 PROCEDURE — 80053 COMPREHEN METABOLIC PANEL: CPT | Performed by: NURSE PRACTITIONER

## 2022-01-07 PROCEDURE — 83605 ASSAY OF LACTIC ACID: CPT | Performed by: NURSE PRACTITIONER

## 2022-01-07 PROCEDURE — 83615 LACTATE (LD) (LDH) ENZYME: CPT | Performed by: NURSE PRACTITIONER

## 2022-01-07 PROCEDURE — 84484 ASSAY OF TROPONIN QUANT: CPT | Performed by: NURSE PRACTITIONER

## 2022-01-07 PROCEDURE — U0002 COVID-19 LAB TEST NON-CDC: HCPCS | Performed by: NURSE PRACTITIONER

## 2022-01-07 PROCEDURE — 82728 ASSAY OF FERRITIN: CPT | Performed by: NURSE PRACTITIONER

## 2022-01-07 PROCEDURE — 85610 PROTHROMBIN TIME: CPT | Performed by: NURSE PRACTITIONER

## 2022-01-07 RX ORDER — ONDANSETRON 4 MG/1
4 TABLET, ORALLY DISINTEGRATING ORAL
Status: COMPLETED | OUTPATIENT
Start: 2022-01-07 | End: 2022-01-07

## 2022-01-07 RX ORDER — HYDROCODONE BITARTRATE AND ACETAMINOPHEN 5; 325 MG/1; MG/1
1 TABLET ORAL EVERY 4 HOURS PRN
Qty: 12 TABLET | Refills: 0 | Status: ON HOLD | OUTPATIENT
Start: 2022-01-07 | End: 2022-12-15

## 2022-01-07 RX ORDER — DEXAMETHASONE SODIUM PHOSPHATE 4 MG/ML
6 INJECTION, SOLUTION INTRA-ARTICULAR; INTRALESIONAL; INTRAMUSCULAR; INTRAVENOUS; SOFT TISSUE
Status: COMPLETED | OUTPATIENT
Start: 2022-01-07 | End: 2022-01-07

## 2022-01-07 RX ORDER — SODIUM CHLORIDE 9 MG/ML
INJECTION, SOLUTION INTRAVENOUS
Status: COMPLETED | OUTPATIENT
Start: 2022-01-07 | End: 2022-01-07

## 2022-01-07 RX ORDER — ONDANSETRON 4 MG/1
4 TABLET, ORALLY DISINTEGRATING ORAL EVERY 8 HOURS PRN
Qty: 30 TABLET | Refills: 0 | Status: ON HOLD | OUTPATIENT
Start: 2022-01-07 | End: 2023-08-24 | Stop reason: HOSPADM

## 2022-01-07 RX ADMIN — ONDANSETRON 4 MG: 4 TABLET, ORALLY DISINTEGRATING ORAL at 04:01

## 2022-01-07 RX ADMIN — DEXAMETHASONE SODIUM PHOSPHATE 6 MG: 4 INJECTION INTRA-ARTICULAR; INTRALESIONAL; INTRAMUSCULAR; INTRAVENOUS; SOFT TISSUE at 04:01

## 2022-01-07 RX ADMIN — SODIUM CHLORIDE: 0.9 INJECTION, SOLUTION INTRAVENOUS at 04:01

## 2022-01-07 NOTE — ED NOTES
Patient complain of body aches after being exposed to covid from her son. States that the anterior left side of her neck hurts the most.

## 2022-01-07 NOTE — DISCHARGE INSTRUCTIONS
Discontinue warfarin over the weekend, take precautions to avoid injury or trauma.  Return for bleeding or new or worsening symptoms such as shortness of breath or chest pain.  Use the medication as prescribed.

## 2022-01-07 NOTE — ED NOTES
Pt ambulated from ED RC 03 to ED 08 with mobile pulse ox. HR up to 110, O2 sats 93% with ambulation. O2 sats reach max of 95% on RA at rest. Increased WOB noted during ambulation trial. Primary ED care team aware.

## 2022-01-07 NOTE — ED PROVIDER NOTES
SCRIBE #1 NOTE: I, Naida Barnes, am scribing for, and in the presence of,  Galilea Gutierrez MD. I have scribed the following portions of the note - Other sections scribed: ED course.       CHIEF COMPLAINT:   Chief Complaint   Patient presents with    COVID-19 Concerns     Pt with complaints of body aches and headache and cough for the past week.       HISTORY OF PRESENT ILLNESS: Delaney Scott who is a 51 y.o. female with PMH of arthritis, asthma, COPD, mitral valve replacement, diabetes and hypertension presents to the emergency department today with complaint of body aches and headache for the past 7 days.  She reports a tight pain to her chest when she takes deep breath and when she walks longer distances. She uses Albuterol inhaler at home and last used this morning. She denies any significant shortness of breath or chest pain at rest.  She has some nausea but denies any vomiting.  She denies any recent fever, chills, or night sweats.    She reports that her 11-year-old son that she lives with tested positive for COVID-19 last week.  She was unable to quarantine from him as she is his primary caregiver.  She is under vaccinated against COVID-19.    She is currently on Coumadin and her INR this morning was 5.5. She denies any significant leg swelling at this time.    She was recently admitted through the ED here at [facility] 11/2/21 through 11/3/21 for SOB and COPD exacerbation.    REVIEW OF SYSTEMS:  Constitutional: no fever, no chills.  Eyes: No discharge. No pain.  HENT: no nasal congestion, no anosmia; No sore throat.   Cardiovascular: No chest pain, no palpitations.  Respiratory: mild cough, positive shortness of breath on exertion.  Gastrointestinal: positive nausea, no diarrhea, No abdominal pain, no vomiting.   Genitourinary: No hematuria, dysuria, urgency.  Musculoskeletal: Diffuse muscle and joint pain.  Skin: No rashes, no lesions.  Neurological: Positive frontal headache, no focal weakness.    Otherwise  remaining ROS negative     ALLERGIES REVIEWED  MEDICATIONS REVIEWED  PMH/PSH/SOC/FH REVIEWED     The history is provided by the patient.    Nursing/Ancillary staff note reviewed.        PHYSICAL EXAM:  VS reviewed  Vitals:    01/07/22 1736   BP:    Pulse: 88   Resp:    Temp:        General Appearance: The patient is alert, has no immediate or signs of toxicity. No acute distress.  The patient is obese.  HEENT: Eyes: Pupils equal; Extra ocular movements intact. No drainage.   Neck:Neck is supple non-tender. No lymphadenopathy. No stridor.   Respiratory: There are no retractions. Faint expiratory wheeze to bilateral lower lobes. Chest wall nontender to palpation.   Cardiovascular: Regular rate and rhythm. Audible murmur.  Gastrointestinal:  Abdomen is soft and non-tender, No guarding, no rebound.  No pulsatile mass.   Neurological: Alert and oriented x 4. No focal weakness. Strength intact 5/5 bilaterally in upper and lower extremities.   Skin: Warm and dry, no rashes.  Musculoskeletal: Extremities are non-tender, non-swollen and have full range of motion.      Past Medical History:   Diagnosis Date    Anticoagulant long-term use     Arthritis     Asthma     COPD with acute exacerbation     Diabetes mellitus     Hypertension          Past Surgical History:   Procedure Laterality Date    CARDIAC SURGERY      MITRAL VALVE REPLACEMENT  2015    [employer] mechanical valve    TUBAL LIGATION           ED COURSE:  ED Course as of 01/07/22 2036 Fri Jan 07, 2022   1736 Patient feels much better following steroids and ambulating with no distress. Her SpO2 dipped down but her heart rate did not increase, which she states also feels like her baseline. Suspect mild hypoxia due to patient's Hx of COPD. She will discontinue Coumadin and will take precautions for bleeding, and have INR rechecked on Monday. Will discharge home with Zofran and pain medications.  [JL]      ED Course User Index  [JL] Naida Barnes     Urgent  evaluation of a 51 year old female with PMH of arthritis, asthma, COPD, mitral valve replacement, diabetes and hypertension that presents to the emergency department today with complaints of body aches and headache for the past 7 days.     Initial exam reveals slight expiratory wheeze in bilateral lower lobes. Patient also reports chest tightness and SOB with walking. Will further evaluate with EKG and chest XRAY here in the ED.    DIFFERENTIAL DIAGNOSIS: After history and physical exam a differential diagnosis was considered, but was not limited to,   Sepsis, meningitis, otitis media/external, nasal polyp, bacterial sinusitis, allergic rhinitis, influenza, COVID19, bacterial/viral pharyngitis, bacterial/viral pneumonia, ACS/MI, PE, aortic dissection, pneumothorax, cardiac tamponade, costochondritis/pleurisy.    ED management: Patient seen for a viral-like illness, therefore due to the most recent recommendations from our hospital administrations/infectious disease at this time, the patient will be swabbed for COVID 19. Rapid COVID is positive.     Ambulatory trial reveals O2 Sats to 91-94%.     Instructed patient on symptomatic treatment and increase oral hydration. Vital signs did not indicate sepsis and patient was welling appearing, okay for discharge home.    EKG grossly unchanged from previous findings on 11/3/21.  Chest XRAY stable compared to 11/2/21.    Discussed patient with Dr. Gutierrez. Will workup for possible admit with repeat PT/INR and coags.     Will give 1000 mL NS as well as 6 mg Decadron.    Patient also reporting bothersome nausea will order 4 mg Zofran ODT.    Results for orders placed or performed during the hospital encounter of 01/07/22   HIV 1/2 Ag/Ab (4th Gen)   Result Value Ref Range    HIV 1/2 Ag/Ab Negative Negative   Hepatitis C Antibody   Result Value Ref Range    Hepatitis C Ab Negative Negative   CBC auto differential   Result Value Ref Range    WBC 4.20 3.90 - 12.70 K/uL    RBC 4.04  4.00 - 5.40 M/uL    Hemoglobin 10.6 (L) 12.0 - 16.0 g/dL    Hematocrit 36.1 (L) 37.0 - 48.5 %    MCV 89 82 - 98 fL    MCH 26.2 (L) 27.0 - 31.0 pg    MCHC 29.4 (L) 32.0 - 36.0 g/dL    RDW 15.5 (H) 11.5 - 14.5 %    Platelets 193 150 - 450 K/uL    MPV 10.7 9.2 - 12.9 fL    Immature Granulocytes 0.2 0.0 - 0.5 %    Gran # (ANC) 2.8 1.8 - 7.7 K/uL    Immature Grans (Abs) 0.01 0.00 - 0.04 K/uL    Lymph # 1.1 1.0 - 4.8 K/uL    Mono # 0.3 0.3 - 1.0 K/uL    Eos # 0.0 0.0 - 0.5 K/uL    Baso # 0.01 0.00 - 0.20 K/uL    nRBC 0 0 /100 WBC    Gran % 67.5 38.0 - 73.0 %    Lymph % 25.0 18.0 - 48.0 %    Mono % 6.9 4.0 - 15.0 %    Eosinophil % 0.2 0.0 - 8.0 %    Basophil % 0.2 0.0 - 1.9 %    Differential Method Automated    Comprehensive metabolic panel   Result Value Ref Range    Sodium 137 136 - 145 mmol/L    Potassium 3.4 (L) 3.5 - 5.1 mmol/L    Chloride 97 95 - 110 mmol/L    CO2 25 23 - 29 mmol/L    Glucose 92 70 - 110 mg/dL    BUN 24 (H) 6 - 20 mg/dL    Creatinine 1.2 0.5 - 1.4 mg/dL    Calcium 8.6 (L) 8.7 - 10.5 mg/dL    Total Protein 7.3 6.0 - 8.4 g/dL    Albumin 3.3 (L) 3.5 - 5.2 g/dL    Total Bilirubin 0.4 0.1 - 1.0 mg/dL    Alkaline Phosphatase 75 55 - 135 U/L    AST 31 10 - 40 U/L    ALT 13 10 - 44 U/L    Anion Gap 15 8 - 16 mmol/L    eGFR if African American >60 >60 mL/min/1.73 m^2    eGFR if non African American 53 (A) >60 mL/min/1.73 m^2   C-Reactive Protein   Result Value Ref Range    CRP 53.2 (H) 0.0 - 8.2 mg/L   Ferritin   Result Value Ref Range    Ferritin 224 20.0 - 300.0 ng/mL   Lactate Dehydrogenase   Result Value Ref Range     (H) 110 - 260 U/L   CK   Result Value Ref Range     (H) 20 - 180 U/L   Lactic Acid, Plasma   Result Value Ref Range    Lactate (Lactic Acid) 1.2 0.5 - 2.2 mmol/L   Troponin I   Result Value Ref Range    Troponin I 0.015 0.000 - 0.026 ng/mL   Protime-INR   Result Value Ref Range    Prothrombin Time 62.7 (H) 9.0 - 12.5 sec    INR 6.4 (HH) 0.8 - 1.2   POCT COVID-19 Rapid Screening    Result Value Ref Range    POC Rapid COVID Positive (A) Negative     Acceptable Yes        Imaging Results          X-Ray Chest AP Portable (Final result)  Result time 01/07/22 14:14:06    Final result by Richard Palmer MD (01/07/22 14:14:06)                 Impression:      Stable chest to earlier exam as noted.      Electronically signed by: Richard Palmer  Date:    01/07/2022  Time:    14:14             Narrative:    EXAMINATION:  XR CHEST AP PORTABLE    CLINICAL HISTORY:  Shortness of breath    TECHNIQUE:  Single frontal view of the chest was performed.    COMPARISON:  November 2, 2021    FINDINGS:  Reconfirmed sternotomy changes and prosthetic valve.  As before, 2 uppermost sternal sutures or fragmented.  Reconfirmed central pulmonary vascular prominence.  No findings of overt failure.  No focal infiltrates.  No pleural fluid.  No pneumothorax.  No acute bony findings.                                IMPRESSION  The primary encounter diagnosis was COVID-19. A diagnosis of Shortness of breath was also pertinent to this visit. Strict instructions to follow up with primary care physician or reference provided for further assessment and evaluation. Given instructions to return for any acute symptoms and verbalized understanding of this medical plan.              Scribe Attestation:   Scribe #1: I performed the above scribed service and the documentation accurately describes the services I performed. I attest to the accuracy of the note.    Attending Attestation:     Physician Attestation Statement for NP/PA:   I have conducted a face to face encounter with this patient in addition to the NP/PA, due to Medical Complexity            I, Galilea Gutierrez MD, personally performed the services described in this documentation. All medical record entries made by the scribe were at my direction and in my presence. I have reviewed the chart and agree that the record reflects my personal performance and is accurate  and complete.                       Galilea Gutierrez MD  01/07/22 2033

## 2022-01-10 ENCOUNTER — INFUSION (OUTPATIENT)
Dept: INFECTIOUS DISEASES | Facility: HOSPITAL | Age: 52
End: 2022-01-10
Attending: EMERGENCY MEDICINE
Payer: MEDICAID

## 2022-01-10 VITALS
BODY MASS INDEX: 47.09 KG/M2 | OXYGEN SATURATION: 95 % | TEMPERATURE: 98 F | HEART RATE: 81 BPM | DIASTOLIC BLOOD PRESSURE: 59 MMHG | HEIGHT: 66 IN | WEIGHT: 293 LBS | SYSTOLIC BLOOD PRESSURE: 127 MMHG | RESPIRATION RATE: 20 BRPM

## 2022-01-10 DIAGNOSIS — U07.1 COVID-19: Primary | ICD-10-CM

## 2022-01-10 PROCEDURE — 25000003 PHARM REV CODE 250: Performed by: INTERNAL MEDICINE

## 2022-01-10 PROCEDURE — 63600175 PHARM REV CODE 636 W HCPCS: Performed by: INTERNAL MEDICINE

## 2022-01-10 PROCEDURE — M0243 CASIRIVI AND IMDEVI INFUSION: HCPCS | Performed by: INTERNAL MEDICINE

## 2022-01-10 RX ORDER — SODIUM CHLORIDE 0.9 % (FLUSH) 0.9 %
10 SYRINGE (ML) INJECTION
Status: DISCONTINUED | OUTPATIENT
Start: 2022-01-10 | End: 2022-12-15

## 2022-01-10 RX ORDER — ACETAMINOPHEN 325 MG/1
650 TABLET ORAL ONCE AS NEEDED
Status: DISCONTINUED | OUTPATIENT
Start: 2022-01-10 | End: 2022-12-15

## 2022-01-10 RX ORDER — ONDANSETRON 4 MG/1
4 TABLET, ORALLY DISINTEGRATING ORAL ONCE AS NEEDED
Status: DISCONTINUED | OUTPATIENT
Start: 2022-01-10 | End: 2022-12-15

## 2022-01-10 RX ORDER — EPINEPHRINE 0.3 MG/.3ML
0.3 INJECTION SUBCUTANEOUS
Status: DISCONTINUED | OUTPATIENT
Start: 2022-01-10 | End: 2022-12-15

## 2022-01-10 RX ORDER — DIPHENHYDRAMINE HYDROCHLORIDE 50 MG/ML
25 INJECTION INTRAMUSCULAR; INTRAVENOUS ONCE AS NEEDED
Status: DISCONTINUED | OUTPATIENT
Start: 2022-01-10 | End: 2022-12-15

## 2022-01-10 RX ORDER — ALBUTEROL SULFATE 90 UG/1
2 AEROSOL, METERED RESPIRATORY (INHALATION)
Status: DISCONTINUED | OUTPATIENT
Start: 2022-01-10 | End: 2022-12-15

## 2022-01-10 RX ADMIN — CASIRIVIMAB AND IMDEVIMAB 600 MG: 600; 600 INJECTION, SOLUTION, CONCENTRATE INTRAVENOUS at 02:01

## 2022-01-10 NOTE — PROGRESS NOTES
Patient arrives for casirivimab/ imdevimab infusion. Ambulatory. Pt AAox3. No distress noted. RR even and unlabored.     Symptoms and onset date:  01/03/2022 chills, body aches, headaches, fever, vomiting     Tested COVID + on 01/07/2022

## 2022-12-15 ENCOUNTER — HOSPITAL ENCOUNTER (OUTPATIENT)
Facility: OTHER | Age: 52
Discharge: HOME | End: 2022-12-16
Attending: EMERGENCY MEDICINE | Admitting: HOSPITALIST
Payer: MEDICAID

## 2022-12-15 DIAGNOSIS — J96.22 ACUTE ON CHRONIC RESPIRATORY FAILURE WITH HYPOXIA AND HYPERCAPNIA: ICD-10-CM

## 2022-12-15 DIAGNOSIS — R07.9 CHEST PAIN: ICD-10-CM

## 2022-12-15 DIAGNOSIS — R05.9 COUGH: ICD-10-CM

## 2022-12-15 DIAGNOSIS — J44.1 COPD WITH ACUTE EXACERBATION: Primary | ICD-10-CM

## 2022-12-15 DIAGNOSIS — J96.21 ACUTE ON CHRONIC RESPIRATORY FAILURE WITH HYPOXIA AND HYPERCAPNIA: ICD-10-CM

## 2022-12-15 PROBLEM — D72.829 LEUKOCYTOSIS: Status: ACTIVE | Noted: 2022-12-15

## 2022-12-15 LAB
ALBUMIN SERPL BCP-MCNC: 3.2 G/DL (ref 3.5–5.2)
ALP SERPL-CCNC: 105 U/L (ref 55–135)
ALT SERPL W/O P-5'-P-CCNC: 16 U/L (ref 10–44)
ANION GAP SERPL CALC-SCNC: 7 MMOL/L (ref 8–16)
AST SERPL-CCNC: 17 U/L (ref 10–40)
BASOPHILS # BLD AUTO: 0.02 K/UL (ref 0–0.2)
BASOPHILS NFR BLD: 0.2 % (ref 0–1.9)
BILIRUB SERPL-MCNC: 0.4 MG/DL (ref 0.1–1)
BNP SERPL-MCNC: 67 PG/ML (ref 0–99)
BUN SERPL-MCNC: 10 MG/DL (ref 6–20)
CALCIUM SERPL-MCNC: 9.1 MG/DL (ref 8.7–10.5)
CHLORIDE SERPL-SCNC: 105 MMOL/L (ref 95–110)
CO2 SERPL-SCNC: 26 MMOL/L (ref 23–29)
CREAT SERPL-MCNC: 0.9 MG/DL (ref 0.5–1.4)
CTP QC/QA: YES
CTP QC/QA: YES
DIFFERENTIAL METHOD: ABNORMAL
EOSINOPHIL # BLD AUTO: 0.1 K/UL (ref 0–0.5)
EOSINOPHIL NFR BLD: 0.7 % (ref 0–8)
ERYTHROCYTE [DISTWIDTH] IN BLOOD BY AUTOMATED COUNT: 16 % (ref 11.5–14.5)
EST. GFR  (NO RACE VARIABLE): >60 ML/MIN/1.73 M^2
GLUCOSE SERPL-MCNC: 112 MG/DL (ref 70–110)
GROUP A STREP, MOLECULAR: NEGATIVE
HCO3 UR-SCNC: 34.1 MMOL/L (ref 24–28)
HCT VFR BLD AUTO: 34.5 % (ref 37–48.5)
HCT VFR BLD CALC: 35 %PCV (ref 36–54)
HCV AB SERPL QL IA: NEGATIVE
HGB BLD-MCNC: 12 G/DL
HGB BLD-MCNC: 9.8 G/DL (ref 12–16)
HIV 1+2 AB+HIV1 P24 AG SERPL QL IA: NEGATIVE
IMM GRANULOCYTES # BLD AUTO: 0.04 K/UL (ref 0–0.04)
IMM GRANULOCYTES NFR BLD AUTO: 0.3 % (ref 0–0.5)
INR PPP: 2.2 (ref 0.8–1.2)
LACTATE SERPL-SCNC: 0.8 MMOL/L (ref 0.5–2.2)
LYMPHOCYTES # BLD AUTO: 0.6 K/UL (ref 1–4.8)
LYMPHOCYTES NFR BLD: 5.1 % (ref 18–48)
MCH RBC QN AUTO: 26.3 PG (ref 27–31)
MCHC RBC AUTO-ENTMCNC: 28.4 G/DL (ref 32–36)
MCV RBC AUTO: 93 FL (ref 82–98)
MONOCYTES # BLD AUTO: 0.7 K/UL (ref 0.3–1)
MONOCYTES NFR BLD: 5.6 % (ref 4–15)
NEUTROPHILS # BLD AUTO: 10.7 K/UL (ref 1.8–7.7)
NEUTROPHILS NFR BLD: 88.1 % (ref 38–73)
NRBC BLD-RTO: 0 /100 WBC
PCO2 BLDA: 58.8 MMHG (ref 35–45)
PH SMN: 7.37 [PH] (ref 7.35–7.45)
PLATELET # BLD AUTO: 250 K/UL (ref 150–450)
PMV BLD AUTO: 9.8 FL (ref 9.2–12.9)
PO2 BLDA: 46 MMHG (ref 40–60)
POC BE: 9 MMOL/L
POC IONIZED CALCIUM: 1.22 MMOL/L (ref 1.06–1.42)
POC MOLECULAR INFLUENZA A AGN: NEGATIVE
POC MOLECULAR INFLUENZA B AGN: NEGATIVE
POC SATURATED O2: 79 % (ref 95–100)
POC TCO2: 36 MMOL/L (ref 24–29)
POCT GLUCOSE: 124 MG/DL (ref 70–110)
POCT GLUCOSE: 161 MG/DL (ref 70–110)
POTASSIUM BLD-SCNC: 5.5 MMOL/L (ref 3.5–5.1)
POTASSIUM SERPL-SCNC: 4.1 MMOL/L (ref 3.5–5.1)
PROT SERPL-MCNC: 7.4 G/DL (ref 6–8.4)
PROTHROMBIN TIME: 23.5 SEC (ref 9–12.5)
RBC # BLD AUTO: 3.72 M/UL (ref 4–5.4)
SAMPLE: ABNORMAL
SARS-COV-2 RDRP RESP QL NAA+PROBE: NEGATIVE
SODIUM BLD-SCNC: 137 MMOL/L (ref 136–145)
SODIUM SERPL-SCNC: 138 MMOL/L (ref 136–145)
TROPONIN I SERPL DL<=0.01 NG/ML-MCNC: <0.006 NG/ML (ref 0–0.03)
WBC # BLD AUTO: 12.17 K/UL (ref 3.9–12.7)

## 2022-12-15 PROCEDURE — 96365 THER/PROPH/DIAG IV INF INIT: CPT

## 2022-12-15 PROCEDURE — 85025 COMPLETE CBC W/AUTO DIFF WBC: CPT | Performed by: EMERGENCY MEDICINE

## 2022-12-15 PROCEDURE — 99220 PR INITIAL OBSERVATION CARE,LEVL III: ICD-10-PCS | Mod: ,,, | Performed by: NURSE PRACTITIONER

## 2022-12-15 PROCEDURE — 63600175 PHARM REV CODE 636 W HCPCS: Performed by: EMERGENCY MEDICINE

## 2022-12-15 PROCEDURE — 94640 AIRWAY INHALATION TREATMENT: CPT | Mod: XB

## 2022-12-15 PROCEDURE — 96366 THER/PROPH/DIAG IV INF ADDON: CPT

## 2022-12-15 PROCEDURE — G0378 HOSPITAL OBSERVATION PER HR: HCPCS

## 2022-12-15 PROCEDURE — 93005 ELECTROCARDIOGRAM TRACING: CPT

## 2022-12-15 PROCEDURE — 94761 N-INVAS EAR/PLS OXIMETRY MLT: CPT

## 2022-12-15 PROCEDURE — 85610 PROTHROMBIN TIME: CPT | Performed by: EMERGENCY MEDICINE

## 2022-12-15 PROCEDURE — 94640 AIRWAY INHALATION TREATMENT: CPT

## 2022-12-15 PROCEDURE — 99285 EMERGENCY DEPT VISIT HI MDM: CPT | Mod: 25

## 2022-12-15 PROCEDURE — 25000242 PHARM REV CODE 250 ALT 637 W/ HCPCS: Performed by: EMERGENCY MEDICINE

## 2022-12-15 PROCEDURE — 27000221 HC OXYGEN, UP TO 24 HOURS

## 2022-12-15 PROCEDURE — 86803 HEPATITIS C AB TEST: CPT | Performed by: EMERGENCY MEDICINE

## 2022-12-15 PROCEDURE — 63600175 PHARM REV CODE 636 W HCPCS: Performed by: NURSE PRACTITIONER

## 2022-12-15 PROCEDURE — 83605 ASSAY OF LACTIC ACID: CPT | Performed by: NURSE PRACTITIONER

## 2022-12-15 PROCEDURE — 87635 SARS-COV-2 COVID-19 AMP PRB: CPT | Performed by: EMERGENCY MEDICINE

## 2022-12-15 PROCEDURE — 84484 ASSAY OF TROPONIN QUANT: CPT | Performed by: EMERGENCY MEDICINE

## 2022-12-15 PROCEDURE — 99220 PR INITIAL OBSERVATION CARE,LEVL III: CPT | Mod: ,,, | Performed by: NURSE PRACTITIONER

## 2022-12-15 PROCEDURE — 93010 EKG 12-LEAD: ICD-10-PCS | Mod: ,,, | Performed by: INTERNAL MEDICINE

## 2022-12-15 PROCEDURE — 87389 HIV-1 AG W/HIV-1&-2 AB AG IA: CPT | Performed by: EMERGENCY MEDICINE

## 2022-12-15 PROCEDURE — 25000242 PHARM REV CODE 250 ALT 637 W/ HCPCS: Performed by: NURSE PRACTITIONER

## 2022-12-15 PROCEDURE — 96375 TX/PRO/DX INJ NEW DRUG ADDON: CPT

## 2022-12-15 PROCEDURE — 93010 ELECTROCARDIOGRAM REPORT: CPT | Mod: ,,, | Performed by: INTERNAL MEDICINE

## 2022-12-15 PROCEDURE — 25000003 PHARM REV CODE 250: Performed by: NURSE PRACTITIONER

## 2022-12-15 PROCEDURE — 80053 COMPREHEN METABOLIC PANEL: CPT | Performed by: EMERGENCY MEDICINE

## 2022-12-15 PROCEDURE — 36415 COLL VENOUS BLD VENIPUNCTURE: CPT | Performed by: NURSE PRACTITIONER

## 2022-12-15 PROCEDURE — 83880 ASSAY OF NATRIURETIC PEPTIDE: CPT | Performed by: EMERGENCY MEDICINE

## 2022-12-15 PROCEDURE — 96372 THER/PROPH/DIAG INJ SC/IM: CPT | Performed by: NURSE PRACTITIONER

## 2022-12-15 PROCEDURE — 87651 STREP A DNA AMP PROBE: CPT | Performed by: EMERGENCY MEDICINE

## 2022-12-15 RX ORDER — KETOROLAC TROMETHAMINE 30 MG/ML
15 INJECTION, SOLUTION INTRAMUSCULAR; INTRAVENOUS
Status: COMPLETED | OUTPATIENT
Start: 2022-12-15 | End: 2022-12-15

## 2022-12-15 RX ORDER — DOCUSATE SODIUM 100 MG/1
100 CAPSULE, LIQUID FILLED ORAL DAILY PRN
Status: DISCONTINUED | OUTPATIENT
Start: 2022-12-15 | End: 2022-12-16 | Stop reason: HOSPADM

## 2022-12-15 RX ORDER — POLYETHYLENE GLYCOL 3350 17 G/17G
17 POWDER, FOR SOLUTION ORAL DAILY PRN
Status: DISCONTINUED | OUTPATIENT
Start: 2022-12-15 | End: 2022-12-16 | Stop reason: HOSPADM

## 2022-12-15 RX ORDER — ONDANSETRON 2 MG/ML
4 INJECTION INTRAMUSCULAR; INTRAVENOUS EVERY 6 HOURS PRN
Status: DISCONTINUED | OUTPATIENT
Start: 2022-12-15 | End: 2022-12-16 | Stop reason: HOSPADM

## 2022-12-15 RX ORDER — PANTOPRAZOLE SODIUM 40 MG/1
40 TABLET, DELAYED RELEASE ORAL DAILY
Status: DISCONTINUED | OUTPATIENT
Start: 2022-12-16 | End: 2022-12-16 | Stop reason: HOSPADM

## 2022-12-15 RX ORDER — SACUBITRIL AND VALSARTAN 24; 26 MG/1; MG/1
1 TABLET, FILM COATED ORAL 2 TIMES DAILY
Status: ON HOLD | COMMUNITY
Start: 2022-12-11 | End: 2023-10-31 | Stop reason: SDUPTHER

## 2022-12-15 RX ORDER — IPRATROPIUM BROMIDE AND ALBUTEROL SULFATE 2.5; .5 MG/3ML; MG/3ML
3 SOLUTION RESPIRATORY (INHALATION)
Status: COMPLETED | OUTPATIENT
Start: 2022-12-15 | End: 2022-12-15

## 2022-12-15 RX ORDER — IPRATROPIUM BROMIDE AND ALBUTEROL SULFATE 2.5; .5 MG/3ML; MG/3ML
3 SOLUTION RESPIRATORY (INHALATION) EVERY 6 HOURS
Status: DISCONTINUED | OUTPATIENT
Start: 2022-12-15 | End: 2022-12-16 | Stop reason: HOSPADM

## 2022-12-15 RX ORDER — WARFARIN 2.5 MG/1
5 TABLET ORAL DAILY
Status: DISCONTINUED | OUTPATIENT
Start: 2022-12-15 | End: 2022-12-16 | Stop reason: HOSPADM

## 2022-12-15 RX ORDER — ACETAMINOPHEN 325 MG/1
650 TABLET ORAL EVERY 4 HOURS PRN
Status: DISCONTINUED | OUTPATIENT
Start: 2022-12-15 | End: 2022-12-16 | Stop reason: HOSPADM

## 2022-12-15 RX ORDER — ATORVASTATIN CALCIUM 20 MG/1
40 TABLET, FILM COATED ORAL DAILY
Status: DISCONTINUED | OUTPATIENT
Start: 2022-12-16 | End: 2022-12-16 | Stop reason: HOSPADM

## 2022-12-15 RX ORDER — FLUTICASONE PROPIONATE 50 MCG
2 SPRAY, SUSPENSION (ML) NASAL
Status: ON HOLD | COMMUNITY
Start: 2022-11-14 | End: 2023-10-31 | Stop reason: HOSPADM

## 2022-12-15 RX ORDER — MAGNESIUM SULFATE HEPTAHYDRATE 40 MG/ML
2 INJECTION, SOLUTION INTRAVENOUS ONCE
Status: DISCONTINUED | OUTPATIENT
Start: 2022-12-15 | End: 2022-12-15

## 2022-12-15 RX ORDER — FUROSEMIDE 20 MG/1
40 TABLET ORAL DAILY
Status: DISCONTINUED | OUTPATIENT
Start: 2022-12-15 | End: 2022-12-16 | Stop reason: HOSPADM

## 2022-12-15 RX ORDER — LANOLIN ALCOHOL/MO/W.PET/CERES
1 CREAM (GRAM) TOPICAL DAILY
Status: DISCONTINUED | OUTPATIENT
Start: 2022-12-16 | End: 2022-12-16 | Stop reason: HOSPADM

## 2022-12-15 RX ORDER — INSULIN ASPART 100 [IU]/ML
1-10 INJECTION, SOLUTION INTRAVENOUS; SUBCUTANEOUS
Status: DISCONTINUED | OUTPATIENT
Start: 2022-12-15 | End: 2022-12-16 | Stop reason: HOSPADM

## 2022-12-15 RX ORDER — WARFARIN 2.5 MG/1
2.5 TABLET ORAL
Status: DISCONTINUED | OUTPATIENT
Start: 2022-12-21 | End: 2022-12-16 | Stop reason: HOSPADM

## 2022-12-15 RX ORDER — CETIRIZINE HYDROCHLORIDE 10 MG/1
10 TABLET ORAL DAILY
Status: DISCONTINUED | OUTPATIENT
Start: 2022-12-16 | End: 2022-12-16 | Stop reason: HOSPADM

## 2022-12-15 RX ORDER — ALBUTEROL SULFATE 2.5 MG/.5ML
5 SOLUTION RESPIRATORY (INHALATION)
Status: COMPLETED | OUTPATIENT
Start: 2022-12-15 | End: 2022-12-15

## 2022-12-15 RX ORDER — ALBUTEROL SULFATE 2.5 MG/.5ML
2.5 SOLUTION RESPIRATORY (INHALATION) EVERY 6 HOURS PRN
Status: DISCONTINUED | OUTPATIENT
Start: 2022-12-15 | End: 2022-12-16 | Stop reason: HOSPADM

## 2022-12-15 RX ORDER — CARVEDILOL 6.25 MG/1
6.25 TABLET ORAL 2 TIMES DAILY WITH MEALS
Status: DISCONTINUED | OUTPATIENT
Start: 2022-12-15 | End: 2022-12-16 | Stop reason: HOSPADM

## 2022-12-15 RX ORDER — METHYLPREDNISOLONE SOD SUCC 125 MG
125 VIAL (EA) INJECTION
Status: COMPLETED | OUTPATIENT
Start: 2022-12-15 | End: 2022-12-15

## 2022-12-15 RX ORDER — IBUPROFEN 200 MG
24 TABLET ORAL
Status: DISCONTINUED | OUTPATIENT
Start: 2022-12-15 | End: 2022-12-16 | Stop reason: HOSPADM

## 2022-12-15 RX ORDER — GLUCAGON 1 MG
1 KIT INJECTION
Status: DISCONTINUED | OUTPATIENT
Start: 2022-12-15 | End: 2022-12-16 | Stop reason: HOSPADM

## 2022-12-15 RX ORDER — NALOXONE HCL 0.4 MG/ML
0.02 VIAL (ML) INJECTION
Status: DISCONTINUED | OUTPATIENT
Start: 2022-12-15 | End: 2022-12-16 | Stop reason: HOSPADM

## 2022-12-15 RX ORDER — CYCLOBENZAPRINE HCL 10 MG
10 TABLET ORAL 3 TIMES DAILY PRN
Status: DISCONTINUED | OUTPATIENT
Start: 2022-12-15 | End: 2022-12-16 | Stop reason: HOSPADM

## 2022-12-15 RX ORDER — PREDNISONE 50 MG/1
50 TABLET ORAL DAILY
Status: DISCONTINUED | OUTPATIENT
Start: 2022-12-16 | End: 2022-12-16

## 2022-12-15 RX ORDER — IBUPROFEN 200 MG
16 TABLET ORAL
Status: DISCONTINUED | OUTPATIENT
Start: 2022-12-15 | End: 2022-12-16 | Stop reason: HOSPADM

## 2022-12-15 RX ORDER — MAGNESIUM SULFATE HEPTAHYDRATE 40 MG/ML
2 INJECTION, SOLUTION INTRAVENOUS ONCE
Status: COMPLETED | OUTPATIENT
Start: 2022-12-15 | End: 2022-12-15

## 2022-12-15 RX ADMIN — MAGNESIUM SULFATE HEPTAHYDRATE 2 G: 40 INJECTION, SOLUTION INTRAVENOUS at 10:12

## 2022-12-15 RX ADMIN — WARFARIN SODIUM 5 MG: 2.5 TABLET ORAL at 06:12

## 2022-12-15 RX ADMIN — ALBUTEROL SULFATE 5 MG: 2.5 SOLUTION RESPIRATORY (INHALATION) at 10:12

## 2022-12-15 RX ADMIN — CARVEDILOL 6.25 MG: 6.25 TABLET, FILM COATED ORAL at 06:12

## 2022-12-15 RX ADMIN — METHYLPREDNISOLONE SODIUM SUCCINATE 125 MG: 125 INJECTION, POWDER, FOR SOLUTION INTRAMUSCULAR; INTRAVENOUS at 09:12

## 2022-12-15 RX ADMIN — IPRATROPIUM BROMIDE AND ALBUTEROL SULFATE 3 ML: 2.5; .5 SOLUTION RESPIRATORY (INHALATION) at 08:12

## 2022-12-15 RX ADMIN — SACUBITRIL AND VALSARTAN 1 TABLET: 24; 26 TABLET, FILM COATED ORAL at 08:12

## 2022-12-15 RX ADMIN — INSULIN ASPART 1 UNITS: 100 INJECTION, SOLUTION INTRAVENOUS; SUBCUTANEOUS at 08:12

## 2022-12-15 RX ADMIN — IPRATROPIUM BROMIDE AND ALBUTEROL SULFATE 3 ML: .5; 3 SOLUTION RESPIRATORY (INHALATION) at 07:12

## 2022-12-15 RX ADMIN — FUROSEMIDE 40 MG: 20 TABLET ORAL at 06:12

## 2022-12-15 RX ADMIN — IPRATROPIUM BROMIDE AND ALBUTEROL SULFATE 3 ML: 2.5; .5 SOLUTION RESPIRATORY (INHALATION) at 09:12

## 2022-12-15 RX ADMIN — KETOROLAC TROMETHAMINE 15 MG: 30 INJECTION, SOLUTION INTRAMUSCULAR; INTRAVENOUS at 03:12

## 2022-12-15 NOTE — ED PROVIDER NOTES
Encounter Date: 12/15/2022       History     Chief Complaint   Patient presents with    Shortness of Breath    COVID-19 Concerns     C/o SOB, non productive cough and sore throat that begna yesterday. PMH COPD and Asthma. O2 94-95% on RA. Speaking in complete sentences. Wheezing noted. All other VSS.      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  Patient had onset of these symptoms yesterday, in addition to nasal congestion, sore throat, and cough.  She also reports central chest pain when she coughs or takes a deep breath, and had some leg swelling yesterday as well.  She is compliant with her medications but ran out of her home albuterol nebulizer liquid, has been using albuterol pump with little relief.  She states her niece was sick with the flu last week, denies any other known sick contacts.  No fevers, vomiting, diarrhea, or other complaints.    Review of patient's allergies indicates:  No Known Allergies  Past Medical History:   Diagnosis Date    Anticoagulant long-term use     Arthritis     Asthma     COPD with acute exacerbation     Diabetes mellitus     Hypertension      Past Surgical History:   Procedure Laterality Date    CARDIAC SURGERY      MITRAL VALVE REPLACEMENT  2015    [employer] mechanical valve    TUBAL LIGATION       Family History   Problem Relation Age of Onset    Hypertension Mother     Cancer Mother         unknown type    Diabetes type II Mother     Hypertension Father      Social History     Tobacco Use    Smoking status: Some Days     Types: Cigarettes    Smokeless tobacco: Never   Substance Use Topics    Alcohol use: No    Drug use: No     Review of Systems   Constitutional:  Negative for fever.   HENT:  Positive for congestion and sore throat.    Eyes:  Negative for redness.   Respiratory:  Positive for cough, shortness of breath and wheezing.     Cardiovascular:  Positive for chest pain.   Gastrointestinal:  Negative for abdominal pain.   Genitourinary:  Negative for dysuria.   Skin:  Negative for rash.   Neurological:  Negative for headaches.   Psychiatric/Behavioral:  Negative for confusion.      Physical Exam     Initial Vitals [12/15/22 0809]   BP Pulse Resp Temp SpO2   (!) 158/72 92 17 98.3 °F (36.8 °C) (!) 94 %      MAP       --         Physical Exam    Constitutional: She appears well-developed and well-nourished. She is not diaphoretic. No distress.   HENT:   Head: Normocephalic and atraumatic.   Eyes: Conjunctivae are normal.   Neck: Neck supple.   Normal range of motion.  Cardiovascular:  Normal rate, regular rhythm, S1 normal, S2 normal, normal heart sounds and intact distal pulses.           No murmur heard.  Pulmonary/Chest: She has wheezes. She has no rhonchi. She has no rales.   Diffuse inspiratory and expiratory wheezing, somewhat decreased breath sounds at bases but no rales   Abdominal: Abdomen is soft. There is no abdominal tenderness. There is no rebound and no guarding.   Musculoskeletal:         General: No edema.      Cervical back: Normal range of motion and neck supple.     Neurological: She is alert and oriented to person, place, and time.   Skin: Skin is warm and dry.   Psychiatric: She has a normal mood and affect.       ED Course   Procedures  Labs Reviewed   CBC W/ AUTO DIFFERENTIAL - Abnormal; Notable for the following components:       Result Value    RBC 3.72 (*)     Hemoglobin 9.8 (*)     Hematocrit 34.5 (*)     MCH 26.3 (*)     MCHC 28.4 (*)     RDW 16.0 (*)     Gran # (ANC) 10.7 (*)     Lymph # 0.6 (*)     Gran % 88.1 (*)     Lymph % 5.1 (*)     All other components within normal limits   COMPREHENSIVE METABOLIC PANEL - Abnormal; Notable for the following components:    Glucose 112 (*)     Albumin 3.2 (*)     Anion Gap 7 (*)     All other components within normal limits   PROTIME-INR - Abnormal; Notable for the following  components:    Prothrombin Time 23.5 (*)     INR 2.2 (*)     All other components within normal limits   ISTAT PROCEDURE - Abnormal; Notable for the following components:    POC PCO2 58.8 (*)     POC HCO3 34.1 (*)     POC SATURATED O2 79 (*)     POC Potassium 5.5 (*)     POC TCO2 36 (*)     POC Hematocrit 35 (*)     All other components within normal limits   GROUP A STREP, MOLECULAR   TROPONIN I   B-TYPE NATRIURETIC PEPTIDE   HIV 1 / 2 ANTIBODY    Narrative:     Release to patient->Immediate   HEPATITIS C ANTIBODY    Narrative:     Release to patient->Immediate   SARS-COV-2 RDRP GENE   POCT INFLUENZA A/B MOLECULAR     EKG Readings: (Independently Interpreted)   Normal sinus rhythm at 91, no significant change from previous, no acute ischemia   ECG Results              EKG 12-lead (In process)  Result time 12/15/22 14:44:05      In process by Interface, Lab In [facility] (12/15/22 14:44:05)                   Narrative:    Test Reason : R07.9,    Vent. Rate : 091 BPM     Atrial Rate : 091 BPM     P-R Int : 160 ms          QRS Dur : 080 ms      QT Int : 382 ms       P-R-T Axes : 000 090 059 degrees     QTc Int : 469 ms    Normal sinus rhythm with sinus arrhythmia  Rightward axis  Nonspecific T wave abnormality  Abnormal ECG      Referred By: [email or web address]   SELF           Confirmed By:                                   Imaging Results              X-Ray Chest AP Portable (Final result)  Result time 12/15/22 08:51:35      Final result by Nitish Flores MD (12/15/22 08:51:35)                   Impression:      No convincing evidence of acute cardiopulmonary disease.      Electronically signed by: Nitish Flores  Date:    12/15/2022  Time:    08:51               Narrative:    EXAMINATION:  XR CHEST AP PORTABLE    CLINICAL HISTORY:  Cough, unspecified    TECHNIQUE:  Single frontal view of the chest was performed.    COMPARISON:  Chest radiograph 01/07/2022, 14:07 hours.    FINDINGS:  Monitoring leads overlie the chest.   Status post median sternotomy.  Dehiscence of two upper sternotomy wires, similar configuration to 01/07/2022 radiograph.  Prior valve replacement.    Grossly unchanged cardiomediastinal contours.  Prominence of the central pulmonary vasculature, as before.  Lungs appear essentially clear.  No definite pneumothorax or large volume pleural effusion.    No acute findings in the visualized abdomen.  Osseous and soft tissue structures appear without definite acute change.                                       Medications   methylPREDNISolone sodium succinate injection 125 mg (125 mg Intravenous Given 12/15/22 0920)   albuterol-ipratropium 2.5 mg-0.5 mg/3 mL nebulizer solution 3 mL (3 mLs Nebulization Given 12/15/22 0903)   albuterol sulfate nebulizer solution 5 mg (5 mg Nebulization Given 12/15/22 1020)   magnesium sulfate 2g in water 50mL IVPB (premix) (0 g Intravenous Stopped 12/15/22 1227)   ketorolac injection 15 mg (15 mg Intravenous Given 12/15/22 1512)     Medical Decision Making:   Initial Assessment:       52-year-old female with history of COPD/asthma, CHF, DM, HTN, 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  She also reports nasal congestion, sore throat, and central chest pain with cough and deep breath.  She is compliant with all medications including Lasix and Coumadin, states she had swollen legs yesterday but no noted on exam today.  She does have diffuse inspiratory and expiratory wheezing, with somewhat decreased air entry at the bases but no definite rales to suggest pulmonary edema.  O2 sat on room air ranging from 92-95% prior to any treatments.  She does report a positive sick contact with the flu.  Most likely asthma exacerbation, less likely CHF.  Asthma likely from viral URI, will also evaluate for flu, COVID, and pneumonia.      After IV Solu-Medrol and nebs,  patient with improved wheezing, minimal expiratory wheezing on reassessment.  Basic labs unremarkable, with chronic anemia, no elevated troponin or BNP to suggest CHF exacerbation.  Flu/COVID negative, chest x-ray with no evidence for pneumonia.  Most likely viral URI causing asthma exacerbation.  On reassessment patient resting comfortably, but has episodes of hypoxia on room air down to 89%.  I suspect she has some degree of obstructive sleep apnea, and has been diagnosed with this in the past but does not use CPAP.  Patient was also found to be somnolent so VBG checked with chronic mild hypercapnia but no acidosis, no indication for BiPAP at this point.  She is easily arousable and no active altered mental status.  Ambulatory O2 sat on room air checked and patient did desat to 89% again, and on reassessment expiratory wheezing is recurrent.  Will admit to hospitalist for further treatment of asthma exacerbation given new O2 requirement.  She had similar admission about a year ago.                            Clinical Impression:   Final diagnoses:  [R05.9] Cough  [R07.9] Chest pain  [J44.1] COPD with acute exacerbation (Primary)        ED Disposition Condition    Observation Stable                Lucio Herman MD  12/15/22 9572

## 2022-12-15 NOTE — ED NOTES
MD aware pt is now desatting on room air with minimal activity. Pt is also increasingly drowsy. Vbg ordered.

## 2022-12-15 NOTE — ED TRIAGE NOTES
Pt c/o SOB, cough, chest pain, throat pain and nausea since last PM. Pt states her niece was recently sick with flu like symptoms. States she had some leg swelling yesterday but that went away. Resperations even and labored, accessory muscle use noted. Pt speaking in short sentences. Exp wheezing noted on exam.

## 2022-12-16 VITALS
RESPIRATION RATE: 16 BRPM | HEIGHT: 67 IN | TEMPERATURE: 98 F | WEIGHT: 293 LBS | BODY MASS INDEX: 45.99 KG/M2 | SYSTOLIC BLOOD PRESSURE: 127 MMHG | HEART RATE: 89 BPM | DIASTOLIC BLOOD PRESSURE: 62 MMHG | OXYGEN SATURATION: 94 %

## 2022-12-16 LAB
ANION GAP SERPL CALC-SCNC: 7 MMOL/L (ref 8–16)
BASOPHILS # BLD AUTO: 0.02 K/UL (ref 0–0.2)
BASOPHILS NFR BLD: 0.2 % (ref 0–1.9)
BUN SERPL-MCNC: 15 MG/DL (ref 6–20)
CALCIUM SERPL-MCNC: 9.2 MG/DL (ref 8.7–10.5)
CHLORIDE SERPL-SCNC: 104 MMOL/L (ref 95–110)
CO2 SERPL-SCNC: 28 MMOL/L (ref 23–29)
CREAT SERPL-MCNC: 1 MG/DL (ref 0.5–1.4)
DIFFERENTIAL METHOD: ABNORMAL
EOSINOPHIL # BLD AUTO: 0 K/UL (ref 0–0.5)
EOSINOPHIL NFR BLD: 0 % (ref 0–8)
ERYTHROCYTE [DISTWIDTH] IN BLOOD BY AUTOMATED COUNT: 16.3 % (ref 11.5–14.5)
EST. GFR  (NO RACE VARIABLE): >60 ML/MIN/1.73 M^2
GLUCOSE SERPL-MCNC: 108 MG/DL (ref 70–110)
HCT VFR BLD AUTO: 33.8 % (ref 37–48.5)
HGB BLD-MCNC: 9.8 G/DL (ref 12–16)
IMM GRANULOCYTES # BLD AUTO: 0.02 K/UL (ref 0–0.04)
IMM GRANULOCYTES NFR BLD AUTO: 0.2 % (ref 0–0.5)
INR PPP: 2.5 (ref 0.8–1.2)
LYMPHOCYTES # BLD AUTO: 0.8 K/UL (ref 1–4.8)
LYMPHOCYTES NFR BLD: 8.6 % (ref 18–48)
MCH RBC QN AUTO: 27.2 PG (ref 27–31)
MCHC RBC AUTO-ENTMCNC: 29 G/DL (ref 32–36)
MCV RBC AUTO: 94 FL (ref 82–98)
MONOCYTES # BLD AUTO: 1 K/UL (ref 0.3–1)
MONOCYTES NFR BLD: 10 % (ref 4–15)
NEUTROPHILS # BLD AUTO: 7.9 K/UL (ref 1.8–7.7)
NEUTROPHILS NFR BLD: 81 % (ref 38–73)
NRBC BLD-RTO: 0 /100 WBC
PLATELET # BLD AUTO: 255 K/UL (ref 150–450)
PMV BLD AUTO: 10.1 FL (ref 9.2–12.9)
POCT GLUCOSE: 111 MG/DL (ref 70–110)
POCT GLUCOSE: 133 MG/DL (ref 70–110)
POTASSIUM SERPL-SCNC: 4.4 MMOL/L (ref 3.5–5.1)
PROTHROMBIN TIME: 26.2 SEC (ref 9–12.5)
RBC # BLD AUTO: 3.6 M/UL (ref 4–5.4)
SODIUM SERPL-SCNC: 139 MMOL/L (ref 136–145)
TSH SERPL DL<=0.005 MIU/L-ACNC: 0.79 UIU/ML (ref 0.4–4)
WBC # BLD AUTO: 9.76 K/UL (ref 3.9–12.7)

## 2022-12-16 PROCEDURE — 85610 PROTHROMBIN TIME: CPT | Performed by: NURSE PRACTITIONER

## 2022-12-16 PROCEDURE — G0378 HOSPITAL OBSERVATION PER HR: HCPCS

## 2022-12-16 PROCEDURE — 25000003 PHARM REV CODE 250: Performed by: PHYSICIAN ASSISTANT

## 2022-12-16 PROCEDURE — 25000242 PHARM REV CODE 250 ALT 637 W/ HCPCS: Performed by: NURSE PRACTITIONER

## 2022-12-16 PROCEDURE — 80048 BASIC METABOLIC PNL TOTAL CA: CPT | Performed by: NURSE PRACTITIONER

## 2022-12-16 PROCEDURE — 85025 COMPLETE CBC W/AUTO DIFF WBC: CPT | Performed by: NURSE PRACTITIONER

## 2022-12-16 PROCEDURE — 63600175 PHARM REV CODE 636 W HCPCS: Performed by: NURSE PRACTITIONER

## 2022-12-16 PROCEDURE — 94618 PULMONARY STRESS TESTING: CPT

## 2022-12-16 PROCEDURE — 94640 AIRWAY INHALATION TREATMENT: CPT | Mod: XB

## 2022-12-16 PROCEDURE — 25000003 PHARM REV CODE 250: Performed by: NURSE PRACTITIONER

## 2022-12-16 PROCEDURE — 99225 PR SUBSEQUENT OBSERVATION CARE,LEVEL II: CPT | Mod: ,,, | Performed by: PHYSICIAN ASSISTANT

## 2022-12-16 PROCEDURE — 84443 ASSAY THYROID STIM HORMONE: CPT | Performed by: NURSE PRACTITIONER

## 2022-12-16 PROCEDURE — 99900035 HC TECH TIME PER 15 MIN (STAT)

## 2022-12-16 PROCEDURE — 99225 PR SUBSEQUENT OBSERVATION CARE,LEVEL II: ICD-10-PCS | Mod: ,,, | Performed by: PHYSICIAN ASSISTANT

## 2022-12-16 PROCEDURE — 94761 N-INVAS EAR/PLS OXIMETRY MLT: CPT

## 2022-12-16 PROCEDURE — 27000221 HC OXYGEN, UP TO 24 HOURS

## 2022-12-16 PROCEDURE — 36415 COLL VENOUS BLD VENIPUNCTURE: CPT | Performed by: NURSE PRACTITIONER

## 2022-12-16 RX ORDER — PREDNISONE 20 MG/1
60 TABLET ORAL DAILY
Status: DISCONTINUED | OUTPATIENT
Start: 2022-12-17 | End: 2022-12-16 | Stop reason: HOSPADM

## 2022-12-16 RX ORDER — LIDOCAINE 50 MG/G
1 PATCH TOPICAL DAILY
Qty: 5 PATCH | Refills: 0 | Status: ON HOLD | OUTPATIENT
Start: 2022-12-16 | End: 2023-08-24 | Stop reason: HOSPADM

## 2022-12-16 RX ORDER — BENZONATATE 100 MG/1
100 CAPSULE ORAL 3 TIMES DAILY PRN
Qty: 15 CAPSULE | Refills: 0 | Status: SHIPPED | OUTPATIENT
Start: 2022-12-16 | End: 2023-08-23

## 2022-12-16 RX ORDER — ALBUTEROL SULFATE 0.83 MG/ML
2.5 SOLUTION RESPIRATORY (INHALATION) EVERY 6 HOURS PRN
Qty: 90 ML | Refills: 0 | Status: ON HOLD | OUTPATIENT
Start: 2022-12-16 | End: 2023-08-24 | Stop reason: SDUPTHER

## 2022-12-16 RX ORDER — GUAIFENESIN 600 MG/1
600 TABLET, EXTENDED RELEASE ORAL 2 TIMES DAILY
Status: DISCONTINUED | OUTPATIENT
Start: 2022-12-16 | End: 2022-12-16 | Stop reason: HOSPADM

## 2022-12-16 RX ORDER — LIDOCAINE 50 MG/G
1 PATCH TOPICAL
Status: DISCONTINUED | OUTPATIENT
Start: 2022-12-16 | End: 2022-12-16 | Stop reason: HOSPADM

## 2022-12-16 RX ORDER — PREDNISONE 20 MG/1
60 TABLET ORAL DAILY
Qty: 9 TABLET | Refills: 0 | Status: SHIPPED | OUTPATIENT
Start: 2022-12-17 | End: 2022-12-20

## 2022-12-16 RX ADMIN — ATORVASTATIN CALCIUM 40 MG: 20 TABLET, FILM COATED ORAL at 08:12

## 2022-12-16 RX ADMIN — PANTOPRAZOLE SODIUM 40 MG: 40 TABLET, DELAYED RELEASE ORAL at 08:12

## 2022-12-16 RX ADMIN — IPRATROPIUM BROMIDE AND ALBUTEROL SULFATE 3 ML: .5; 3 SOLUTION RESPIRATORY (INHALATION) at 11:12

## 2022-12-16 RX ADMIN — CARVEDILOL 6.25 MG: 6.25 TABLET, FILM COATED ORAL at 08:12

## 2022-12-16 RX ADMIN — IPRATROPIUM BROMIDE AND ALBUTEROL SULFATE 3 ML: .5; 3 SOLUTION RESPIRATORY (INHALATION) at 01:12

## 2022-12-16 RX ADMIN — PREDNISONE 50 MG: 50 TABLET ORAL at 08:12

## 2022-12-16 RX ADMIN — IPRATROPIUM BROMIDE AND ALBUTEROL SULFATE 3 ML: .5; 3 SOLUTION RESPIRATORY (INHALATION) at 07:12

## 2022-12-16 RX ADMIN — GUAIFENESIN 600 MG: 600 TABLET, EXTENDED RELEASE ORAL at 10:12

## 2022-12-16 RX ADMIN — LIDOCAINE 1 PATCH: 50 PATCH CUTANEOUS at 10:12

## 2022-12-16 RX ADMIN — FUROSEMIDE 40 MG: 20 TABLET ORAL at 08:12

## 2022-12-16 RX ADMIN — CETIRIZINE HYDROCHLORIDE 10 MG: 10 TABLET, FILM COATED ORAL at 08:12

## 2022-12-16 NOTE — SUBJECTIVE & OBJECTIVE
Past Medical History:   Diagnosis Date    Anticoagulant long-term use     Arthritis     Asthma     COPD with acute exacerbation     Diabetes mellitus     Hypertension        Past Surgical History:   Procedure Laterality Date    CARDIAC SURGERY      MITRAL VALVE REPLACEMENT  2015    [employer] mechanical valve    TUBAL LIGATION         Review of patient's allergies indicates:  No Known Allergies    No current facility-administered medications on file prior to encounter.     Current Outpatient Medications on File Prior to Encounter   Medication Sig    atorvastatin (LIPITOR) 40 MG tablet Take 40 mg by mouth once daily.    carvediloL (COREG) 6.25 MG tablet Take 6.25 mg by mouth 2 (two) times daily with meals.    cyclobenzaprine (FLEXERIL) 10 MG tablet Take 10 mg by mouth 3 (three) times daily.    ENTRESTO 24-26 mg per tablet Take 1 tablet by mouth 2 (two) times daily.    furosemide (LASIX) 40 MG tablet Take 40 mg by mouth once daily.    metFORMIN (GLUCOPHAGE) 1000 MG tablet Take 1,000 mg by mouth 2 (two) times daily with meals.    umeclidinium-vilanteroL (ANORO ELLIPTA) 62.5-25 mcg/actuation DsDv Inhale 1 puff into the lungs.    warfarin (COUMADIN) 5 MG tablet Take 1 tablet (5 mg total) by mouth Daily. (Patient taking differently: Take 5 mg by mouth Daily. Taking 7.5mg MWTh, 5mg all other days)    albuterol sulfate 2.5 mg/0.5 mL Nebu Take 2.5 mg by nebulization every 4 (four) hours as needed (For shortness of breath or wheezing). Rescue    blood sugar diagnostic Strp use once daily TO TEST BLOOD GLUCOSE.    blood-glucose meter Misc as directed    ferrous sulfate (FEOSOL) 325 mg (65 mg iron) Tab tablet Take 325 mg by mouth.    fluticasone propionate (FLONASE) 50 mcg/actuation nasal spray 2 sprays by Each Nostril route.    lancets 33 gauge Misc by Misc.(Non-Drug; Combo Route) route once daily.    loratadine (CLARITIN) 10 mg tablet Take 10 mg by mouth once daily.    omeprazole (PRILOSEC) 20 MG capsule Take 1 capsule (20 mg  total) by mouth 2 (two) times a day.    ondansetron (ZOFRAN-ODT) 4 MG TbDL Take 1 tablet (4 mg total) by mouth every 8 (eight) hours as needed (nausea).    [DISCONTINUED] HYDROcodone-acetaminophen (NORCO) 5-325 mg per tablet Take 1 tablet by mouth every 4 (four) hours as needed for Pain.     Family History       Problem Relation (Age of Onset)    Cancer Mother    Diabetes type II Mother    Hypertension Mother, Father          Tobacco Use    Smoking status: Former     Types: Cigarettes    Smokeless tobacco: Never   Substance and Sexual Activity    Alcohol use: No    Drug use: No    Sexual activity: Not on file     Review of Systems   Constitutional:  Negative for activity change, chills and fever.   HENT:  Positive for congestion and sore throat. Negative for trouble swallowing.    Eyes:  Negative for photophobia and visual disturbance.   Respiratory:  Positive for cough (Nonproductive) and shortness of breath. Negative for wheezing.    Cardiovascular:  Positive for leg swelling (minimal). Negative for chest pain and palpitations.   Gastrointestinal:  Negative for abdominal pain, diarrhea, nausea and vomiting.   Endocrine: Negative for polydipsia and polyuria.   Genitourinary:  Negative for dysuria and frequency.   Musculoskeletal:  Positive for back pain (chronic). Negative for arthralgias and gait problem.   Skin:  Negative for rash and wound.   Neurological:  Negative for dizziness, speech difficulty, weakness and headaches.   Psychiatric/Behavioral:  Negative for confusion and sleep disturbance.    Objective:     Vital Signs (Most Recent):  Temp: 97.7 °F (36.5 °C) (12/15/22 1946)  Pulse: 87 (12/15/22 1946)  Resp: 18 (12/15/22 1946)  BP: 132/61 (12/15/22 1946)  SpO2: (!) 93 % (12/15/22 1946)   Vital Signs (24h Range):  Temp:  [97.7 °F (36.5 °C)-98.6 °F (37 °C)] 97.7 °F (36.5 °C)  Pulse:  [] 87  Resp:  [16-42] 18  SpO2:  [87 %-98 %] 93 %  BP: (130-158)/(61-72) 132/61     Weight: (!) 149.7 kg (330 lb)  Body  mass index is 51.69 kg/m².    Physical Exam  Vitals reviewed.   Constitutional:       General: She is not in acute distress.     Appearance: She is not ill-appearing.   HENT:      Head: Normocephalic and atraumatic.      Nose: No congestion.      Mouth/Throat:      Mouth: Mucous membranes are moist.      Pharynx: Oropharynx is clear.   Eyes:      General:         Right eye: No discharge.         Left eye: No discharge.      Extraocular Movements: Extraocular movements intact.      Pupils: Pupils are equal, round, and reactive to light.   Cardiovascular:      Rate and Rhythm: Normal rate and regular rhythm.      Heart sounds: Normal heart sounds. No murmur heard.  Pulmonary:      Effort: Pulmonary effort is normal. No respiratory distress.      Breath sounds: Normal breath sounds. No wheezing or rhonchi.      Comments: Nasal cannula at 2 L  Abdominal:      General: Bowel sounds are normal. There is no distension.      Palpations: Abdomen is soft.      Tenderness: There is no abdominal tenderness. There is no guarding.   Musculoskeletal:         General: No swelling or tenderness. Normal range of motion.      Cervical back: Normal range of motion. No rigidity or tenderness.      Right lower leg: No edema.      Left lower leg: No edema.   Skin:     General: Skin is warm.      Capillary Refill: Capillary refill takes less than 2 seconds.      Findings: No lesion or rash.   Neurological:      General: No focal deficit present.      Mental Status: She is alert and oriented to person, place, and time.      Sensory: No sensory deficit.      Gait: Gait normal.   Psychiatric:         Mood and Affect: Mood normal.         Behavior: Behavior normal.         CRANIAL NERVES     CN III, IV, VI   Pupils are equal, round, and reactive to light.     Significant Labs: All pertinent labs within the past 24 hours have been reviewed.  ABGs:   Recent Labs   Lab 12/15/22  1352   PH 7.371   PCO2 58.8*   HCO3 34.1*   POCSATURATED 79*   BE 9    PO2 46     BMP:   Recent Labs   Lab 12/15/22  0845   *      K 4.1      CO2 26   BUN 10   CREATININE 0.9   CALCIUM 9.1     CBC:   Recent Labs   Lab 12/15/22  0845 12/15/22  1352   WBC 12.17  --    HGB 9.8*  --    HCT 34.5* 35*     --      CMP:   Recent Labs   Lab 12/15/22  0845      K 4.1      CO2 26   *   BUN 10   CREATININE 0.9   CALCIUM 9.1   PROT 7.4   ALBUMIN 3.2*   BILITOT 0.4   ALKPHOS 105   AST 17   ALT 16   ANIONGAP 7*     Lactic Acid:   Recent Labs   Lab 12/15/22  1708   LACTATE 0.8     POCT Glucose:   Recent Labs   Lab 12/15/22  1653 12/15/22  1950   POCTGLUCOSE 124* 161*     Troponin:   Recent Labs   Lab 12/15/22  0845   TROPONINI <0.006     TSH: No results for input(s): TSH in the last 4320 hours.  Urine Culture: No results for input(s): LABURIN in the last 48 hours.    Significant Imaging: I have reviewed all pertinent imaging results/findings within the past 24 hours.  X-Ray Chest AP Portable  Narrative: EXAMINATION:  XR CHEST AP PORTABLE    CLINICAL HISTORY:  Cough, unspecified    TECHNIQUE:  Single frontal view of the chest was performed.    COMPARISON:  Chest radiograph 01/07/2022, 14:07 hours.    FINDINGS:  Monitoring leads overlie the chest.  Status post median sternotomy.  Dehiscence of two upper sternotomy wires, similar configuration to 01/07/2022 radiograph.  Prior valve replacement.    Grossly unchanged cardiomediastinal contours.  Prominence of the central pulmonary vasculature, as before.  Lungs appear essentially clear.  No definite pneumothorax or large volume pleural effusion.    No acute findings in the visualized abdomen.  Osseous and soft tissue structures appear without definite acute change.  Impression: No convincing evidence of acute cardiopulmonary disease.    Electronically signed by: Nitish Flores  Date:    12/15/2022  Time:    08:51

## 2022-12-16 NOTE — H&P
[address] Medicine  History & Physical    Patient Name: Delaney Scott  MRN: [id number]  Patient Class: OP- Observation  Admission Date: 12/15/2022  Attending Physician: Duy Moy MD   Primary Care Provider: [facility]         Patient information was obtained from patient and ER records.     Subjective:     Principal Problem:Acute on chronic respiratory failure with hypoxia and hypercapnia    Chief Complaint:   Chief Complaint   Patient presents with    Shortness of Breath    COVID-19 Concerns     C/o SOB, non productive cough and sore throat that begna yesterday. PMH COPD and Asthma. O2 94-95% on RA. Speaking in complete sentences. Wheezing noted. All other VSS.         HPI: Ms Baltazar is a 52 year old woman with a past medical history that includes COPD, asthma, heart failure, diabetes type 2, hypertension, mitral valve replacement and long-term anticoagulation.  She came to the emergency department for shortness of breath with associated nonproductive cough, sore throat, and nasal congestion that began yesterday.  Patient denies any associated chest pain.  She reports compliance with medications and denies the use of cigarettes.  In the emergency department patient had a chest x-ray that was negative for any pulmonary edema, pleural effusion, or consolidation.  Her BNP was 67 and her troponin was less than 0.006.  Patient did have venous blood gases that resulted in a pH of 7.37, pCO2 of 58.8, and P HC03 of 34.1.  Patient received albuterol, DuoNeb treatment, IV Solu-Medrol, and IV magnesium.  Her symptoms improved and her oxygen saturation has been maintained on 2 L of supplemental oxygen.  Patient's COVID and flu tests were negative.  Patient admitted to hospital medicine for COPD exacerbation.      Past Medical History:   Diagnosis Date    Anticoagulant long-term use     Arthritis     Asthma     COPD with acute exacerbation     Diabetes mellitus      Hypertension        Past Surgical History:   Procedure Laterality Date    CARDIAC SURGERY      MITRAL VALVE REPLACEMENT  2015    [employer] mechanical valve    TUBAL LIGATION         Review of patient's allergies indicates:  No Known Allergies    No current facility-administered medications on file prior to encounter.     Current Outpatient Medications on File Prior to Encounter   Medication Sig    atorvastatin (LIPITOR) 40 MG tablet Take 40 mg by mouth once daily.    carvediloL (COREG) 6.25 MG tablet Take 6.25 mg by mouth 2 (two) times daily with meals.    cyclobenzaprine (FLEXERIL) 10 MG tablet Take 10 mg by mouth 3 (three) times daily.    ENTRESTO 24-26 mg per tablet Take 1 tablet by mouth 2 (two) times daily.    furosemide (LASIX) 40 MG tablet Take 40 mg by mouth once daily.    metFORMIN (GLUCOPHAGE) 1000 MG tablet Take 1,000 mg by mouth 2 (two) times daily with meals.    umeclidinium-vilanteroL (ANORO ELLIPTA) 62.5-25 mcg/actuation DsDv Inhale 1 puff into the lungs.    warfarin (COUMADIN) 5 MG tablet Take 1 tablet (5 mg total) by mouth Daily. (Patient taking differently: Take 5 mg by mouth Daily. Taking 7.5mg MWTh, 5mg all other days)    albuterol sulfate 2.5 mg/0.5 mL Nebu Take 2.5 mg by nebulization every 4 (four) hours as needed (For shortness of breath or wheezing). Rescue    blood sugar diagnostic Strp use once daily TO TEST BLOOD GLUCOSE.    blood-glucose meter Misc as directed    ferrous sulfate (FEOSOL) 325 mg (65 mg iron) Tab tablet Take 325 mg by mouth.    fluticasone propionate (FLONASE) 50 mcg/actuation nasal spray 2 sprays by Each Nostril route.    lancets 33 gauge Misc by Misc.(Non-Drug; Combo Route) route once daily.    loratadine (CLARITIN) 10 mg tablet Take 10 mg by mouth once daily.    omeprazole (PRILOSEC) 20 MG capsule Take 1 capsule (20 mg total) by mouth 2 (two) times a day.    ondansetron (ZOFRAN-ODT) 4 MG TbDL Take 1 tablet (4 mg total) by mouth every 8 (eight) hours as  needed (nausea).    [DISCONTINUED] HYDROcodone-acetaminophen (NORCO) 5-325 mg per tablet Take 1 tablet by mouth every 4 (four) hours as needed for Pain.     Family History       Problem Relation (Age of Onset)    Cancer Mother    Diabetes type II Mother    Hypertension Mother, Father          Tobacco Use    Smoking status: Former     Types: Cigarettes    Smokeless tobacco: Never   Substance and Sexual Activity    Alcohol use: No    Drug use: No    Sexual activity: Not on file     Review of Systems   Constitutional:  Negative for activity change, chills and fever.   HENT:  Positive for congestion and sore throat. Negative for trouble swallowing.    Eyes:  Negative for photophobia and visual disturbance.   Respiratory:  Positive for cough (Nonproductive) and shortness of breath. Negative for wheezing.    Cardiovascular:  Positive for leg swelling (minimal). Negative for chest pain and palpitations.   Gastrointestinal:  Negative for abdominal pain, diarrhea, nausea and vomiting.   Endocrine: Negative for polydipsia and polyuria.   Genitourinary:  Negative for dysuria and frequency.   Musculoskeletal:  Positive for back pain (chronic). Negative for arthralgias and gait problem.   Skin:  Negative for rash and wound.   Neurological:  Negative for dizziness, speech difficulty, weakness and headaches.   Psychiatric/Behavioral:  Negative for confusion and sleep disturbance.    Objective:     Vital Signs (Most Recent):  Temp: 97.7 °F (36.5 °C) (12/15/22 1946)  Pulse: 87 (12/15/22 1946)  Resp: 18 (12/15/22 1946)  BP: 132/61 (12/15/22 1946)  SpO2: (!) 93 % (12/15/22 1946)   Vital Signs (24h Range):  Temp:  [97.7 °F (36.5 °C)-98.6 °F (37 °C)] 97.7 °F (36.5 °C)  Pulse:  [] 87  Resp:  [16-42] 18  SpO2:  [87 %-98 %] 93 %  BP: (130-158)/(61-72) 132/61     Weight: (!) 149.7 kg (330 lb)  Body mass index is 51.69 kg/m².    Physical Exam  Vitals reviewed.   Constitutional:       General: She is not in acute distress.      Appearance: She is not ill-appearing.   HENT:      Head: Normocephalic and atraumatic.      Nose: No congestion.      Mouth/Throat:      Mouth: Mucous membranes are moist.      Pharynx: Oropharynx is clear.   Eyes:      General:         Right eye: No discharge.         Left eye: No discharge.      Extraocular Movements: Extraocular movements intact.      Pupils: Pupils are equal, round, and reactive to light.   Cardiovascular:      Rate and Rhythm: Normal rate and regular rhythm.      Heart sounds: Normal heart sounds. No murmur heard.  Pulmonary:      Effort: Pulmonary effort is normal. No respiratory distress.      Breath sounds: Normal breath sounds. No wheezing or rhonchi.      Comments: Nasal cannula at 2 L  Abdominal:      General: Bowel sounds are normal. There is no distension.      Palpations: Abdomen is soft.      Tenderness: There is no abdominal tenderness. There is no guarding.   Musculoskeletal:         General: No swelling or tenderness. Normal range of motion.      Cervical back: Normal range of motion. No rigidity or tenderness.      Right lower leg: No edema.      Left lower leg: No edema.   Skin:     General: Skin is warm.      Capillary Refill: Capillary refill takes less than 2 seconds.      Findings: No lesion or rash.   Neurological:      General: No focal deficit present.      Mental Status: She is alert and oriented to person, place, and time.      Sensory: No sensory deficit.      Gait: Gait normal.   Psychiatric:         Mood and Affect: Mood normal.         Behavior: Behavior normal.         CRANIAL NERVES     CN III, IV, VI   Pupils are equal, round, and reactive to light.     Significant Labs: All pertinent labs within the past 24 hours have been reviewed.  ABGs:   Recent Labs   Lab 12/15/22  1352   PH 7.371   PCO2 58.8*   HCO3 34.1*   POCSATURATED 79*   BE 9   PO2 46     BMP:   Recent Labs   Lab 12/15/22  0845   *      K 4.1      CO2 26   BUN 10   CREATININE 0.9    CALCIUM 9.1     CBC:   Recent Labs   Lab 12/15/22  0845 12/15/22  1352   WBC 12.17  --    HGB 9.8*  --    HCT 34.5* 35*     --      CMP:   Recent Labs   Lab 12/15/22  0845      K 4.1      CO2 26   *   BUN 10   CREATININE 0.9   CALCIUM 9.1   PROT 7.4   ALBUMIN 3.2*   BILITOT 0.4   ALKPHOS 105   AST 17   ALT 16   ANIONGAP 7*     Lactic Acid:   Recent Labs   Lab 12/15/22  1708   LACTATE 0.8     POCT Glucose:   Recent Labs   Lab 12/15/22  1653 12/15/22  1950   POCTGLUCOSE 124* 161*     Troponin:   Recent Labs   Lab 12/15/22  0845   TROPONINI <0.006     TSH: No results for input(s): TSH in the last 4320 hours.  Urine Culture: No results for input(s): LABURIN in the last 48 hours.    Significant Imaging: I have reviewed all pertinent imaging results/findings within the past 24 hours.  X-Ray Chest AP Portable  Narrative: EXAMINATION:  XR CHEST AP PORTABLE    CLINICAL HISTORY:  Cough, unspecified    TECHNIQUE:  Single frontal view of the chest was performed.    COMPARISON:  Chest radiograph 01/07/2022, 14:07 hours.    FINDINGS:  Monitoring leads overlie the chest.  Status post median sternotomy.  Dehiscence of two upper sternotomy wires, similar configuration to 01/07/2022 radiograph.  Prior valve replacement.    Grossly unchanged cardiomediastinal contours.  Prominence of the central pulmonary vasculature, as before.  Lungs appear essentially clear.  No definite pneumothorax or large volume pleural effusion.    No acute findings in the visualized abdomen.  Osseous and soft tissue structures appear without definite acute change.  Impression: No convincing evidence of acute cardiopulmonary disease.    Electronically signed by: Nitish Flores  Date:    12/15/2022  Time:    08:51          Assessment/Plan:     * Acute on chronic respiratory failure with hypoxia and hypercapnia  Combined Heart failure  Chronic obstructive pulmonary disease  Sleep Apnea    Patient with a history of tobacco use, mitral  valve replacement, and sleep apnea.  She does not use a CPAP machine.  She reports she has stopped smoking cigarettes.  Last  ECHO 8/2022 EF 40-45%, grade 2 Diastolic dysfunction.  Current BNP and chest x-ray indicates compensated heart failure.  Her symptoms did improve with the administration of IV Solu-Medrol, nebulizer breathing treatments, and IV magnesium.  Will continue scheduled breathing treatments and a short dose of oral prednisone.  Wean oxygen as tolerated.     Type 2 diabetes mellitus, without long-term current use of insulin  Patient is prescribed 1000 mg of metformin daily.  Last A1c from 2021 is 5.7.  Current glucose is 112.  Appears to be controlled at this time.    Hold Oral hypoglycemics while patient is in the hospital.  Moderate corrective dose of sliding scale insulin ordered.  Will adjust according to blood glucose results.    Essential hypertension  Controlled.  Patient is prescribed carvedilol 6.25 mg daily, furosemide 40 mg daily, and Entresto 24-26 mg daily      Leukocytosis  White blood cell count 12.17.  Lactic acid 0.8.  Patient afebrile.  Chest x-ray does not indicate consolidation or opacities.  Urine pending.  No infective source found at this time.      Chronic anticoagulation  Patient takes Coumadin daily for mitral valve replacement.  She is monitored by [facility] Department.  She is prescribed 5 mg of Coumadin daily, except on Wednesday she is supposed to take 7.5 mg.  Most recent INR is 2.2.    VTE Risk Mitigation (From admission, onward)         Ordered     warfarin (COUMADIN) tablet 2.5 mg  Every Wednesday         12/15/22 1822     warfarin (COUMADIN) tablet 5 mg  Daily         12/15/22 1822     Reason for No Pharmacological VTE Prophylaxis  Once        Question:  Reasons:  Answer:  Already adequately anticoagulated on oral Anticoagulants    12/15/22 1654     IP VTE HIGH RISK PATIENT  Once         12/15/22 1654     Place sequential compression device  Until discontinued          12/15/22 1654                   Elvia Ross DNP  Department of Hospital Medicine   [facility] ([address])

## 2022-12-16 NOTE — ASSESSMENT & PLAN NOTE
Patient is prescribed 1000 mg of metformin daily.  Last A1c from 2021 is 5.7.  Current glucose is 112.  Appears to be controlled at this time.    Hold Oral hypoglycemics while patient is in the hospital.  Moderate corrective dose of sliding scale insulin ordered.  Will adjust according to blood glucose results.

## 2022-12-16 NOTE — PLAN OF CARE
AAOx4. VSS. Poc review with pt and questions answered. Repositions self independently. BS monitoring maintained. Pt receiving 2L O2 via NC with sat's between 95%-98% this shift. Denies sob or difficulty breathing. Pt is eating and tolerating diet well. Voiding adequately up to bathroom and bedside commode. Clear and yellow urine noted.  Pt denies pain or discomfort this shift. Remains free from falls, injury, and skin breakdown. All needs and concerns met. Purposeful rounding done. Safety maintained. Will continue to monitor.

## 2022-12-16 NOTE — NURSING
Nurses Note -- 4 Eyes      12/15/2022   7:17 PM      Skin assessed during: Admit      [x] No Pressure Injuries Present    []Prevention Measures Documented      [] Yes- Altered Skin Integrity Present or Discovered   [] LDA Added if Not in Epic (Describe Wound)   [] New Altered Skin Integrity was Present on Admit and Documented in LDA   [] Wound Image Taken    Wound Care Consulted? No    Attending Nurse:  Nita Whitlock RN     Second RN/Staff Member:  Lorraine DE PAZ

## 2022-12-16 NOTE — ASSESSMENT & PLAN NOTE
Combined Heart failure  Chronic obstructive pulmonary disease  Sleep Apnea    Patient with a history of tobacco use, mitral valve replacement, and sleep apnea.  She does not use a CPAP machine.  She reports she has stopped smoking cigarettes.  Last  ECHO 8/2022 EF 40-45%, grade 2 Diastolic dysfunction.  Current BNP and chest x-ray indicates compensated heart failure.  Her symptoms did improve with the administration of IV Solu-Medrol, nebulizer breathing treatments, and IV magnesium.  Will continue scheduled breathing treatments and a short dose of oral prednisone.  Wean oxygen as tolerated.

## 2022-12-16 NOTE — NURSING
12/16/2022      Pt verbalized understanding D/C instructions and education provided pertinent to D/C needs. IV cath removed fully intact. No distress noted. Pt awaiting pharmacy to deliver bedside medications and then will put in transport for discharge.             Lucita Martinez RN

## 2022-12-16 NOTE — ASSESSMENT & PLAN NOTE
White blood cell count 12.17.  Lactic acid 0.8.  Patient afebrile.  Chest x-ray does not indicate consolidation or opacities.  Urine pending.  No infective source found at this time.

## 2022-12-16 NOTE — HPI
Ms Baltazar is a 52 year old woman with a past medical history that includes COPD, asthma, heart failure, diabetes type 2, hypertension, mitral valve replacement and long-term anticoagulation.  She came to the emergency department for shortness of breath with associated nonproductive cough, sore throat, and nasal congestion that began yesterday.  Patient denies any associated chest pain.  She reports compliance with medications and denies the use of cigarettes.  In the emergency department patient had a chest x-ray that was negative for any pulmonary edema, pleural effusion, or consolidation.  Her BNP was 67 and her troponin was less than 0.006.  Patient did have venous blood gases that resulted in a pH of 7.37, pCO2 of 58.8, and P HC03 of 34.1.  Patient received albuterol, DuoNeb treatment, IV Solu-Medrol, and IV magnesium.  Her symptoms improved and her oxygen saturation has been maintained on 2 L of supplemental oxygen.  Patient's COVID and flu tests were negative.  Patient admitted to hospital medicine for COPD exacerbation.

## 2022-12-16 NOTE — DISCHARGE SUMMARY
[facility] ([address] Medicine  Discharge Summary      Patient Name: Delaney Scott  MRN: [id number]  ABRAHAM: [id number]  Patient Class: OP- Observation  Admission Date: 12/15/2022  Hospital Length of Stay: 0 days  Discharge Date and Time: 12/16/2022  3:42 PM  Attending Physician: Zenaida att. providers found   Discharging Provider: Suzette Hardy PA-C  [facility]    Primary Care Team: Networked reference to record PCT     HPI:   Ms Baltazar is a 52 year old woman with a past medical history that includes COPD, asthma, heart failure, diabetes type 2, hypertension, mitral valve replacement and long-term anticoagulation.  She came to the emergency department for shortness of breath with associated nonproductive cough, sore throat, and nasal congestion that began yesterday.  Patient denies any associated chest pain.  She reports compliance with medications and denies the use of cigarettes.  In the emergency department patient had a chest x-ray that was negative for any pulmonary edema, pleural effusion, or consolidation.  Her BNP was 67 and her troponin was less than 0.006.  Patient did have venous blood gases that resulted in a pH of 7.37, pCO2 of 58.8, and P HC03 of 34.1.  Patient received albuterol, DuoNeb treatment, IV Solu-Medrol, and IV magnesium.  Her symptoms improved and her oxygen saturation has been maintained on 2 L of supplemental oxygen.  Patient's COVID and flu tests were negative.  Patient admitted to hospital medicine for COPD exacerbation.      * No surgery found *      Hospital Course:   Delaney Scott was admitted for COPD/CHF exacerbation. CXR unremarkable. Given lasix Ivp in ED and started on IV solumedrol the PO prednisone with scheduled breathing treatments. Passed 6MWT prior to dc. Stable for dc with prednisone and refill on her nebulizer solution. Pulm referral, return precautions discussed, no further questions at dc       Goals of Care  Treatment Preferences:  Code Status: Full Code      Consults:     * Acute on chronic respiratory failure with hypoxia and hypercapnia  Combined Heart failure  Chronic obstructive pulmonary disease  Sleep Apnea    Patient with a history of tobacco use, mitral valve replacement, and sleep apnea.  She does not use a CPAP machine.  She reports she has stopped smoking cigarettes.  Last  ECHO 8/2022 EF 40-45%, grade 2 Diastolic dysfunction.  Current BNP and chest x-ray indicates compensated heart failure.  Her symptoms did improve with the administration of IV Solu-Medrol, nebulizer breathing treatments, and IV magnesium.  Will continue scheduled breathing treatments and a short dose of oral prednisone.  Wean oxygen as tolerated.   Improved with above measures, passed 6MWT prior to dc  Stable for dc with prednisone to complete 5d course, pulm referal  Return precautions discussed, no further questions      Final Active Diagnoses:    Diagnosis Date Noted POA    PRINCIPAL PROBLEM:  Acute on chronic respiratory failure with hypoxia and hypercapnia [J96.21, J96.22] 12/15/2022 Yes    Leukocytosis [D72.829] 12/15/2022 Yes    COPD with acute exacerbation [J44.1] 11/03/2021 Yes    Essential hypertension [I10] 02/27/2020 Yes    S/P MVR (mitral valve replacement) [Z95.2] 02/27/2020 Not Applicable    Type 2 diabetes mellitus, without long-term current use of insulin [E11.9] 02/27/2020 Yes    Chronic anticoagulation [Z79.01] 02/27/2020 Not Applicable    CHF (congestive heart failure), NYHA class III [I50.9] 12/19/2014 Yes      Problems Resolved During this Admission:       Discharged Condition: stable    Disposition: Home or Self Care    Follow Up:    Patient Instructions:      Ambulatory referral/consult to Pulmonology   Standing Status: Future   Referral Priority: Routine Referral Type: Consultation   Referral Reason: Specialty Services Required   Requested Specialty: Pulmonary Disease   Number of Visits Requested: 1        Significant Diagnostic Studies: Labs:   Troponin   Recent Labs   Lab 12/15/22  0845   TROPONINI <0.006     Radiology: X-Ray: No convincing evidence of acute cardiopulmonary disease.    Pending Diagnostic Studies:     None         Medications:  Reconciled Home Medications:      Medication List      START taking these medications    albuterol 2.5 mg /3 mL (0.083 %) nebulizer solution  Commonly known as: PROVENTIL  Take 3 mLs (2.5 mg total) by nebulization every 6 (six) hours as needed (For shortness of breath or wheezing).  Replaces: albuterol sulfate 2.5 mg/0.5 mL Nebu     benzonatate 100 MG capsule  Commonly known as: TESSALON  Take 1 capsule (100 mg total) by mouth 3 (three) times daily as needed for Cough.     LIDOcaine 5 %  Commonly known as: LIDODERM  Place 1 patch onto the skin once daily. Remove & Discard patch within 12 hours or as directed by MD     predniSONE 20 MG tablet  Commonly known as: DELTASONE  Take 3 tablets (60 mg total) by mouth once daily. for 3 days  Start taking on: December 17, 2022        CHANGE how you take these medications    warfarin 5 MG tablet  Commonly known as: COUMADIN  Take 1 tablet (5 mg total) by mouth Daily.  What changed: additional instructions        CONTINUE taking these medications    atorvastatin 40 MG tablet  Commonly known as: LIPITOR  Take 40 mg by mouth once daily.     carvediloL 6.25 MG tablet  Commonly known as: COREG  Take 6.25 mg by mouth 2 (two) times daily with meals.     cyclobenzaprine 10 MG tablet  Commonly known as: FLEXERIL  Take 10 mg by mouth 3 (three) times daily.     ENTRESTO 24-26 mg per tablet  Generic drug: sacubitriL-valsartan  Take 1 tablet by mouth 2 (two) times daily.     ferrous sulfate 325 mg (65 mg iron) Tab tablet  Commonly known as: FEOSOL  Take 325 mg by mouth.     fluticasone propionate 50 mcg/actuation nasal spray  Commonly known as: FLONASE  2 sprays by Each Nostril route.     furosemide 40 MG tablet  Commonly known as: LASIX  Take  40 mg by mouth once daily.     loratadine 10 mg tablet  Commonly known as: CLARITIN  Take 10 mg by mouth once daily.     metFORMIN 1000 MG tablet  Commonly known as: GLUCOPHAGE  Take 1,000 mg by mouth 2 (two) times daily with meals.     omeprazole 20 MG capsule  Commonly known as: PRILOSEC  Take 1 capsule (20 mg total) by mouth 2 (two) times a day.     ondansetron 4 MG Tbdl  Commonly known as: ZOFRAN-ODT  Take 1 tablet (4 mg total) by mouth every 8 (eight) hours as needed (nausea).     TRUE METRIX GLUCOSE METER Misc  Generic drug: blood-glucose meter  as directed     TRUE METRIX GLUCOSE TEST STRIP Strp  Generic drug: blood sugar diagnostic  use once daily TO TEST BLOOD GLUCOSE.     TRUEPLUS LANCETS 33 gauge Misc  Generic drug: lancets  by Misc.(Non-Drug; Combo Route) route once daily.     umeclidinium-vilanteroL 62.5-25 mcg/actuation Dsdv  Commonly known as: ANORO ELLIPTA  Inhale 1 puff into the lungs.        STOP taking these medications    albuterol sulfate 2.5 mg/0.5 mL Nebu  Replaced by: albuterol 2.5 mg /3 mL (0.083 %) nebulizer solution            Indwelling Lines/Drains at time of discharge:   Lines/Drains/Airways     None                 Time spent on the discharge of patient: >35 minutes         Suzette Hardy PA-C  Department of Hospital Medicine  [facility] ([address])

## 2022-12-16 NOTE — ASSESSMENT & PLAN NOTE
Patient takes Coumadin daily for mitral valve replacement.  She is monitored by [facility] Cardiology Department.  She is prescribed 5 mg of Coumadin daily, except on Wednesday she is supposed to take 7.5 mg.  Most recent INR is 2.2.

## 2022-12-16 NOTE — PROCEDURES
"Instructions for measuring Oxygen Saturation  to qualify for Home Oxygen:     Please obtain and document (REPLACE # SIGNS AND COPY AND PASTE TEXT INSIDE QUOTATION MARKS) the following for Home Oxygen:     This must be performed and documented within 2 days of discharge.     "Pulse Oximetry: 94  % SpO2 on room air at rest on                                 If 88% or less, STOP and document.     If 89% or more, measure and  document the following:     "Pulse Oximetry:  94 %SpO2 on room air at rest on                              90% SpO2 on room air with activity/exercise                               (NOTE:  FOR OXYGEN WITH ACTIVITY - MEDICARE WANTS TO SEE THAT THE OXYGEN INCREASES ONCE A PATIENT HAS WALKED AND IS BACK ON THE OXYGEN)   "

## 2022-12-16 NOTE — ASSESSMENT & PLAN NOTE
Controlled.  Patient is prescribed carvedilol 6.25 mg daily, furosemide 40 mg daily, and Entresto 24-26 mg daily

## 2022-12-16 NOTE — HOSPITAL COURSE
Delaney Scott was admitted for COPD/CHF exacerbation. CXR unremarkable. Given lasix Ivp in ED and started on IV solumedrol the PO prednisone with scheduled breathing treatments. Passed 6MWT prior to dc. Stable for dc with prednisone and refill on her nebulizer solution. Pulm referral, return precautions discussed, no further questions at dc

## 2022-12-21 ENCOUNTER — TELEPHONE (OUTPATIENT)
Dept: CARDIOLOGY | Facility: CLINIC | Age: 52
End: 2022-12-21
Payer: MEDICAID

## 2023-03-20 PROBLEM — J96.21 ACUTE ON CHRONIC RESPIRATORY FAILURE WITH HYPOXIA AND HYPERCAPNIA: Status: RESOLVED | Noted: 2022-12-15 | Resolved: 2023-03-20

## 2023-03-20 PROBLEM — J96.22 ACUTE ON CHRONIC RESPIRATORY FAILURE WITH HYPOXIA AND HYPERCAPNIA: Status: RESOLVED | Noted: 2022-12-15 | Resolved: 2023-03-20

## 2023-08-18 ENCOUNTER — HOSPITAL ENCOUNTER (EMERGENCY)
Facility: OTHER | Age: 53
Discharge: HOME | End: 2023-08-18
Attending: EMERGENCY MEDICINE
Payer: MEDICAID

## 2023-08-18 VITALS
BODY MASS INDEX: 44.41 KG/M2 | HEIGHT: 68 IN | OXYGEN SATURATION: 98 % | HEART RATE: 78 BPM | SYSTOLIC BLOOD PRESSURE: 106 MMHG | TEMPERATURE: 98 F | RESPIRATION RATE: 18 BRPM | WEIGHT: 293 LBS | DIASTOLIC BLOOD PRESSURE: 58 MMHG

## 2023-08-18 DIAGNOSIS — S92.511A CLOSED DISPLACED FRACTURE OF PROXIMAL PHALANX OF LESSER TOE OF RIGHT FOOT, INITIAL ENCOUNTER: Primary | ICD-10-CM

## 2023-08-18 PROCEDURE — 25000003 PHARM REV CODE 250: Performed by: PHYSICIAN ASSISTANT

## 2023-08-18 PROCEDURE — 99283 EMERGENCY DEPT VISIT LOW MDM: CPT

## 2023-08-18 RX ORDER — HYDROCODONE BITARTRATE AND ACETAMINOPHEN 5; 325 MG/1; MG/1
1 TABLET ORAL EVERY 6 HOURS PRN
Qty: 8 TABLET | Refills: 0 | Status: ON HOLD | OUTPATIENT
Start: 2023-08-18 | End: 2023-08-24 | Stop reason: SDUPTHER

## 2023-08-18 RX ORDER — DICLOFENAC SODIUM 10 MG/G
2 GEL TOPICAL 4 TIMES DAILY
Qty: 20 G | Refills: 0 | Status: ON HOLD | OUTPATIENT
Start: 2023-08-18 | End: 2023-08-24 | Stop reason: HOSPADM

## 2023-08-18 RX ORDER — KETOROLAC TROMETHAMINE 10 MG/1
10 TABLET, FILM COATED ORAL
Status: COMPLETED | OUTPATIENT
Start: 2023-08-18 | End: 2023-08-18

## 2023-08-18 RX ADMIN — KETOROLAC TROMETHAMINE 10 MG: 10 TABLET, FILM COATED ORAL at 09:08
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
## 2023-08-18 NOTE — Clinical Note
"Delaney Newelle" Sonia was seen and treated in our emergency department on 8/18/2023.  She may return to work after being cleared by follow-up physician 08/21/2023.  Light duty until cleared by follow-up physician     If you have any questions or concerns, please don't hesitate to call.      Maeve Ascencio PA"

## 2023-08-22 ENCOUNTER — TELEPHONE (OUTPATIENT)
Dept: PODIATRY | Facility: CLINIC | Age: 53
End: 2023-08-22
Payer: MEDICAID

## 2023-08-22 NOTE — TELEPHONE ENCOUNTER
No Available Dr May no New Medicaid Patient            ----- Message from Margarita Huff sent at 8/22/2023  9:25 AM CDT -----  Regarding: Appointment  Name of Who is Calling:  Patient          What is the request in detail:  Patient would like to make an appointment for a broken toe on her right foot, nect to her big toe. Patient has a referral From Maeve Ascencio            Can the clinic reply by [facility]: Yes            What Number to Call Back if not in [facility]:[phone]

## 2023-08-23 ENCOUNTER — HOSPITAL ENCOUNTER (OUTPATIENT)
Facility: OTHER | Age: 53
Discharge: HOME | DRG: 191 | End: 2023-08-24
Attending: EMERGENCY MEDICINE | Admitting: EMERGENCY MEDICINE
Payer: MEDICAID

## 2023-08-23 DIAGNOSIS — E87.6 HYPOKALEMIA: ICD-10-CM

## 2023-08-23 DIAGNOSIS — N83.8 OVARIAN MASS, LEFT: ICD-10-CM

## 2023-08-23 DIAGNOSIS — J44.1 COPD WITH ACUTE EXACERBATION: Primary | ICD-10-CM

## 2023-08-23 DIAGNOSIS — R06.2 WHEEZING: ICD-10-CM

## 2023-08-23 PROBLEM — E78.5 HYPERLIPIDEMIA: Status: ACTIVE | Noted: 2023-05-05

## 2023-08-23 PROBLEM — I44.1 MOBITZ TYPE 1 SECOND DEGREE ATRIOVENTRICULAR BLOCK: Status: ACTIVE | Noted: 2023-08-23

## 2023-08-23 PROBLEM — R19.00 PELVIC MASS: Status: ACTIVE | Noted: 2023-08-23

## 2023-08-23 LAB
ALBUMIN SERPL BCP-MCNC: 3.4 G/DL (ref 3.5–5.2)
ALP SERPL-CCNC: 109 U/L (ref 55–135)
ALT SERPL W/O P-5'-P-CCNC: 8 U/L (ref 10–44)
ANION GAP SERPL CALC-SCNC: 11 MMOL/L (ref 8–16)
AST SERPL-CCNC: 14 U/L (ref 10–40)
BACTERIA #/AREA URNS HPF: ABNORMAL /HPF
BASOPHILS # BLD AUTO: 0.02 K/UL (ref 0–0.2)
BASOPHILS NFR BLD: 0.4 % (ref 0–1.9)
BILIRUB SERPL-MCNC: 0.5 MG/DL (ref 0.1–1)
BILIRUB UR QL STRIP: NEGATIVE
BUN SERPL-MCNC: 11 MG/DL (ref 6–20)
CALCIUM SERPL-MCNC: 9.4 MG/DL (ref 8.7–10.5)
CHLORIDE SERPL-SCNC: 97 MMOL/L (ref 95–110)
CLARITY UR: CLEAR
CO2 SERPL-SCNC: 32 MMOL/L (ref 23–29)
COLOR UR: COLORLESS
CREAT SERPL-MCNC: 1.1 MG/DL (ref 0.5–1.4)
DIFFERENTIAL METHOD: ABNORMAL
EOSINOPHIL # BLD AUTO: 0.1 K/UL (ref 0–0.5)
EOSINOPHIL NFR BLD: 1.4 % (ref 0–8)
ERYTHROCYTE [DISTWIDTH] IN BLOOD BY AUTOMATED COUNT: 17.2 % (ref 11.5–14.5)
EST. GFR  (NO RACE VARIABLE): >60 ML/MIN/1.73 M^2
GLUCOSE SERPL-MCNC: 99 MG/DL (ref 70–110)
GLUCOSE UR QL STRIP: ABNORMAL
HCO3 UR-SCNC: 35.4 MMOL/L (ref 24–28)
HCT VFR BLD AUTO: 38 % (ref 37–48.5)
HGB BLD-MCNC: 11.1 G/DL (ref 12–16)
HGB UR QL STRIP: NEGATIVE
IMM GRANULOCYTES # BLD AUTO: 0.02 K/UL (ref 0–0.04)
IMM GRANULOCYTES NFR BLD AUTO: 0.4 % (ref 0–0.5)
INR PPP: 1.9 (ref 0.8–1.2)
KETONES UR QL STRIP: NEGATIVE
LEUKOCYTE ESTERASE UR QL STRIP: ABNORMAL
LIPASE SERPL-CCNC: 12 U/L (ref 4–60)
LYMPHOCYTES # BLD AUTO: 0.9 K/UL (ref 1–4.8)
LYMPHOCYTES NFR BLD: 15.5 % (ref 18–48)
MAGNESIUM SERPL-MCNC: 2 MG/DL (ref 1.6–2.6)
MCH RBC QN AUTO: 27.2 PG (ref 27–31)
MCHC RBC AUTO-ENTMCNC: 29.2 G/DL (ref 32–36)
MCV RBC AUTO: 93 FL (ref 82–98)
MICROSCOPIC COMMENT: ABNORMAL
MONOCYTES # BLD AUTO: 0.4 K/UL (ref 0.3–1)
MONOCYTES NFR BLD: 6.3 % (ref 4–15)
NEUTROPHILS # BLD AUTO: 4.2 K/UL (ref 1.8–7.7)
NEUTROPHILS NFR BLD: 76 % (ref 38–73)
NITRITE UR QL STRIP: NEGATIVE
NRBC BLD-RTO: 0 /100 WBC
PCO2 BLDA: 55.8 MMHG (ref 35–45)
PH SMN: 7.41 [PH] (ref 7.35–7.45)
PH UR STRIP: 7 [PH] (ref 5–8)
PLATELET # BLD AUTO: 259 K/UL (ref 150–450)
PMV BLD AUTO: 9.6 FL (ref 9.2–12.9)
PO2 BLDA: 18 MMHG (ref 40–60)
POC BE: 11 MMOL/L
POC SATURATED O2: 25 % (ref 95–100)
POC TCO2: 37 MMOL/L (ref 24–29)
POCT GLUCOSE: 96 MG/DL (ref 70–110)
POTASSIUM SERPL-SCNC: 2.9 MMOL/L (ref 3.5–5.1)
PROT SERPL-MCNC: 7.9 G/DL (ref 6–8.4)
PROT UR QL STRIP: NEGATIVE
PROTHROMBIN TIME: 20.4 SEC (ref 9–12.5)
RBC # BLD AUTO: 4.08 M/UL (ref 4–5.4)
RBC #/AREA URNS HPF: 1 /HPF (ref 0–4)
SAMPLE: ABNORMAL
SODIUM SERPL-SCNC: 140 MMOL/L (ref 136–145)
SP GR UR STRIP: 1.01 (ref 1–1.03)
SQUAMOUS #/AREA URNS HPF: 1 /HPF
URN SPEC COLLECT METH UR: ABNORMAL
UROBILINOGEN UR STRIP-ACNC: NEGATIVE EU/DL
WBC # BLD AUTO: 5.54 K/UL (ref 3.9–12.7)
WBC #/AREA URNS HPF: 11 /HPF (ref 0–5)
YEAST URNS QL MICRO: ABNORMAL

## 2023-08-23 PROCEDURE — 99223 1ST HOSP IP/OBS HIGH 75: CPT | Mod: ,,, | Performed by: PHYSICIAN ASSISTANT

## 2023-08-23 PROCEDURE — 25000003 PHARM REV CODE 250: Performed by: PHYSICIAN ASSISTANT

## 2023-08-23 PROCEDURE — G0378 HOSPITAL OBSERVATION PER HR: HCPCS

## 2023-08-23 PROCEDURE — 82962 GLUCOSE BLOOD TEST: CPT

## 2023-08-23 PROCEDURE — 63600175 PHARM REV CODE 636 W HCPCS

## 2023-08-23 PROCEDURE — 83735 ASSAY OF MAGNESIUM: CPT

## 2023-08-23 PROCEDURE — 94640 AIRWAY INHALATION TREATMENT: CPT | Mod: XB

## 2023-08-23 PROCEDURE — 87086 URINE CULTURE/COLONY COUNT: CPT | Performed by: EMERGENCY MEDICINE

## 2023-08-23 PROCEDURE — 93005 ELECTROCARDIOGRAM TRACING: CPT

## 2023-08-23 PROCEDURE — 99900035 HC TECH TIME PER 15 MIN (STAT)

## 2023-08-23 PROCEDURE — 27000221 HC OXYGEN, UP TO 24 HOURS

## 2023-08-23 PROCEDURE — 85025 COMPLETE CBC W/AUTO DIFF WBC: CPT

## 2023-08-23 PROCEDURE — 93010 ELECTROCARDIOGRAM REPORT: CPT | Mod: ,,, | Performed by: INTERNAL MEDICINE

## 2023-08-23 PROCEDURE — 93010 EKG 12-LEAD: ICD-10-PCS | Mod: ,,, | Performed by: INTERNAL MEDICINE

## 2023-08-23 PROCEDURE — 85610 PROTHROMBIN TIME: CPT

## 2023-08-23 PROCEDURE — 83690 ASSAY OF LIPASE: CPT

## 2023-08-23 PROCEDURE — 25000003 PHARM REV CODE 250

## 2023-08-23 PROCEDURE — 99222 1ST HOSP IP/OBS MODERATE 55: CPT | Mod: ,,, | Performed by: INTERNAL MEDICINE

## 2023-08-23 PROCEDURE — 94761 N-INVAS EAR/PLS OXIMETRY MLT: CPT

## 2023-08-23 PROCEDURE — 25000242 PHARM REV CODE 250 ALT 637 W/ HCPCS: Performed by: PHYSICIAN ASSISTANT

## 2023-08-23 PROCEDURE — 99222 PR INITIAL HOSPITAL CARE,LEVL II: ICD-10-PCS | Mod: ,,, | Performed by: INTERNAL MEDICINE

## 2023-08-23 PROCEDURE — 11000001 HC ACUTE MED/SURG PRIVATE ROOM

## 2023-08-23 PROCEDURE — 25500020 PHARM REV CODE 255

## 2023-08-23 PROCEDURE — 81000 URINALYSIS NONAUTO W/SCOPE: CPT | Performed by: EMERGENCY MEDICINE

## 2023-08-23 PROCEDURE — 96374 THER/PROPH/DIAG INJ IV PUSH: CPT | Mod: 59

## 2023-08-23 PROCEDURE — 25000242 PHARM REV CODE 250 ALT 637 W/ HCPCS

## 2023-08-23 PROCEDURE — 99285 EMERGENCY DEPT VISIT HI MDM: CPT | Mod: 25

## 2023-08-23 PROCEDURE — 80053 COMPREHEN METABOLIC PANEL: CPT

## 2023-08-23 PROCEDURE — 96375 TX/PRO/DX INJ NEW DRUG ADDON: CPT

## 2023-08-23 PROCEDURE — 99223 PR INITIAL HOSPITAL CARE,LEVL III: ICD-10-PCS | Mod: ,,, | Performed by: PHYSICIAN ASSISTANT

## 2023-08-23 RX ORDER — WARFARIN SODIUM 5 MG/1
5 TABLET ORAL DAILY
Status: DISCONTINUED | OUTPATIENT
Start: 2023-08-24 | End: 2023-08-24 | Stop reason: HOSPADM

## 2023-08-23 RX ORDER — IPRATROPIUM BROMIDE AND ALBUTEROL SULFATE 2.5; .5 MG/3ML; MG/3ML
3 SOLUTION RESPIRATORY (INHALATION)
Status: DISCONTINUED | OUTPATIENT
Start: 2023-08-23 | End: 2023-08-24 | Stop reason: HOSPADM

## 2023-08-23 RX ORDER — ACETAMINOPHEN 325 MG/1
650 TABLET ORAL EVERY 8 HOURS PRN
Status: DISCONTINUED | OUTPATIENT
Start: 2023-08-23 | End: 2023-08-24 | Stop reason: HOSPADM

## 2023-08-23 RX ORDER — MONTELUKAST SODIUM 10 MG/1
10 TABLET ORAL NIGHTLY
Status: DISCONTINUED | OUTPATIENT
Start: 2023-08-23 | End: 2023-08-24 | Stop reason: HOSPADM

## 2023-08-23 RX ORDER — POTASSIUM CHLORIDE 20 MEQ/1
40 TABLET, EXTENDED RELEASE ORAL
Status: COMPLETED | OUTPATIENT
Start: 2023-08-23 | End: 2023-08-23

## 2023-08-23 RX ORDER — SEMAGLUTIDE 0.68 MG/ML
0.5 INJECTION, SOLUTION SUBCUTANEOUS
COMMUNITY
Start: 2023-08-08

## 2023-08-23 RX ORDER — DOCUSATE SODIUM 100 MG/1
1 CAPSULE, LIQUID FILLED ORAL 2 TIMES DAILY
Status: ON HOLD | COMMUNITY
End: 2023-08-24 | Stop reason: HOSPADM

## 2023-08-23 RX ORDER — FLUTICASONE FUROATE AND VILANTEROL 200; 25 UG/1; UG/1
1 POWDER RESPIRATORY (INHALATION) DAILY
Status: DISCONTINUED | OUTPATIENT
Start: 2023-08-24 | End: 2023-08-24 | Stop reason: HOSPADM

## 2023-08-23 RX ORDER — ATORVASTATIN CALCIUM 20 MG/1
40 TABLET, FILM COATED ORAL DAILY
Status: DISCONTINUED | OUTPATIENT
Start: 2023-08-24 | End: 2023-08-24 | Stop reason: HOSPADM

## 2023-08-23 RX ORDER — IPRATROPIUM BROMIDE AND ALBUTEROL SULFATE 2.5; .5 MG/3ML; MG/3ML
3 SOLUTION RESPIRATORY (INHALATION)
Status: COMPLETED | OUTPATIENT
Start: 2023-08-23 | End: 2023-08-23

## 2023-08-23 RX ORDER — FENTANYL CITRATE-0.9 % NACL/PF 10 MCG/ML
0-250 PLASTIC BAG, INJECTION (ML) INTRAVENOUS CONTINUOUS
Status: DISCONTINUED | OUTPATIENT
Start: 2023-08-23 | End: 2023-08-23

## 2023-08-23 RX ORDER — HYDROCODONE BITARTRATE AND ACETAMINOPHEN 5; 325 MG/1; MG/1
1 TABLET ORAL EVERY 6 HOURS PRN
Status: DISCONTINUED | OUTPATIENT
Start: 2023-08-23 | End: 2023-08-24 | Stop reason: HOSPADM

## 2023-08-23 RX ORDER — GABAPENTIN 100 MG/1
100 CAPSULE ORAL DAILY
COMMUNITY
End: 2023-08-23

## 2023-08-23 RX ORDER — PROCHLORPERAZINE EDISYLATE 5 MG/ML
5 INJECTION INTRAMUSCULAR; INTRAVENOUS EVERY 6 HOURS PRN
Status: DISCONTINUED | OUTPATIENT
Start: 2023-08-23 | End: 2023-08-24 | Stop reason: HOSPADM

## 2023-08-23 RX ORDER — HYDROCODONE BITARTRATE AND ACETAMINOPHEN 5; 325 MG/1; MG/1
1 TABLET ORAL
Status: COMPLETED | OUTPATIENT
Start: 2023-08-23 | End: 2023-08-23

## 2023-08-23 RX ORDER — FLUTICASONE PROPIONATE 50 MCG
2 SPRAY, SUSPENSION (ML) NASAL DAILY
Status: DISCONTINUED | OUTPATIENT
Start: 2023-08-24 | End: 2023-08-24 | Stop reason: HOSPADM

## 2023-08-23 RX ORDER — EMPAGLIFLOZIN 10 MG/1
10 TABLET, FILM COATED ORAL
Status: ON HOLD | COMMUNITY
Start: 2023-07-30 | End: 2023-10-31 | Stop reason: SDUPTHER

## 2023-08-23 RX ORDER — POLYETHYLENE GLYCOL 3350 17 G/17G
17 POWDER, FOR SOLUTION ORAL 2 TIMES DAILY PRN
Status: DISCONTINUED | OUTPATIENT
Start: 2023-08-23 | End: 2023-08-24 | Stop reason: HOSPADM

## 2023-08-23 RX ORDER — MONTELUKAST SODIUM 10 MG/1
10 TABLET ORAL NIGHTLY
Status: ON HOLD | COMMUNITY
Start: 2023-08-07 | End: 2023-10-31 | Stop reason: SDUPTHER

## 2023-08-23 RX ORDER — SODIUM CHLORIDE 0.9 % (FLUSH) 0.9 %
3 SYRINGE (ML) INJECTION
Status: DISCONTINUED | OUTPATIENT
Start: 2023-08-23 | End: 2023-08-24 | Stop reason: HOSPADM

## 2023-08-23 RX ORDER — FLUTICASONE PROPIONATE 220 UG/1
2 AEROSOL, METERED RESPIRATORY (INHALATION) 2 TIMES DAILY
Status: ON HOLD | COMMUNITY
Start: 2023-08-05 | End: 2023-10-31 | Stop reason: HOSPADM

## 2023-08-23 RX ORDER — FUROSEMIDE 40 MG/1
40 TABLET ORAL DAILY
Status: DISCONTINUED | OUTPATIENT
Start: 2023-08-24 | End: 2023-08-24 | Stop reason: HOSPADM

## 2023-08-23 RX ORDER — TALC
6 POWDER (GRAM) TOPICAL NIGHTLY PRN
Status: DISCONTINUED | OUTPATIENT
Start: 2023-08-23 | End: 2023-08-24 | Stop reason: HOSPADM

## 2023-08-23 RX ORDER — HYDROCODONE BITARTRATE AND ACETAMINOPHEN 10; 325 MG/1; MG/1
1 TABLET ORAL EVERY 6 HOURS PRN
Status: DISCONTINUED | OUTPATIENT
Start: 2023-08-23 | End: 2023-08-24 | Stop reason: HOSPADM

## 2023-08-23 RX ORDER — WARFARIN 7.5 MG/1
7.5 TABLET ORAL ONCE
Status: COMPLETED | OUTPATIENT
Start: 2023-08-23 | End: 2023-08-23

## 2023-08-23 RX ORDER — ACETAMINOPHEN 325 MG/1
650 TABLET ORAL EVERY 4 HOURS PRN
Status: DISCONTINUED | OUTPATIENT
Start: 2023-08-23 | End: 2023-08-24 | Stop reason: HOSPADM

## 2023-08-23 RX ORDER — PREDNISONE 20 MG/1
40 TABLET ORAL DAILY
Status: DISCONTINUED | OUTPATIENT
Start: 2023-08-24 | End: 2023-08-24 | Stop reason: HOSPADM

## 2023-08-23 RX ORDER — METHYLPREDNISOLONE SOD SUCC 125 MG
125 VIAL (EA) INJECTION
Status: COMPLETED | OUTPATIENT
Start: 2023-08-23 | End: 2023-08-23

## 2023-08-23 RX ORDER — GUAIFENESIN 600 MG/1
600 TABLET, EXTENDED RELEASE ORAL 2 TIMES DAILY
Status: DISCONTINUED | OUTPATIENT
Start: 2023-08-23 | End: 2023-08-24 | Stop reason: HOSPADM

## 2023-08-23 RX ORDER — ONDANSETRON 8 MG/1
8 TABLET, ORALLY DISINTEGRATING ORAL EVERY 8 HOURS PRN
Status: DISCONTINUED | OUTPATIENT
Start: 2023-08-23 | End: 2023-08-24 | Stop reason: HOSPADM

## 2023-08-23 RX ORDER — ALBUTEROL SULFATE 90 UG/1
2 AEROSOL, METERED RESPIRATORY (INHALATION) EVERY 6 HOURS PRN
Status: ON HOLD | COMMUNITY
Start: 2023-08-04 | End: 2023-08-24 | Stop reason: SDUPTHER

## 2023-08-23 RX ORDER — ONDANSETRON 2 MG/ML
4 INJECTION INTRAMUSCULAR; INTRAVENOUS
Status: COMPLETED | OUTPATIENT
Start: 2023-08-23 | End: 2023-08-23

## 2023-08-23 RX ADMIN — IPRATROPIUM BROMIDE AND ALBUTEROL SULFATE 3 ML: 2.5; .5 SOLUTION RESPIRATORY (INHALATION) at 04:08

## 2023-08-23 RX ADMIN — IPRATROPIUM BROMIDE AND ALBUTEROL SULFATE 3 ML: 2.5; .5 SOLUTION RESPIRATORY (INHALATION) at 08:08

## 2023-08-23 RX ADMIN — WARFARIN SODIUM 7.5 MG: 7.5 TABLET ORAL at 05:08

## 2023-08-23 RX ADMIN — IPRATROPIUM BROMIDE AND ALBUTEROL SULFATE 3 ML: 2.5; .5 SOLUTION RESPIRATORY (INHALATION) at 09:08

## 2023-08-23 RX ADMIN — IOHEXOL 100 ML: 350 INJECTION, SOLUTION INTRAVENOUS at 10:08

## 2023-08-23 RX ADMIN — HYDROCODONE BITARTRATE AND ACETAMINOPHEN 1 TABLET: 5; 325 TABLET ORAL at 10:08

## 2023-08-23 RX ADMIN — IPRATROPIUM BROMIDE AND ALBUTEROL SULFATE 3 ML: 2.5; .5 SOLUTION RESPIRATORY (INHALATION) at 01:08

## 2023-08-23 RX ADMIN — GUAIFENESIN 600 MG: 600 TABLET, EXTENDED RELEASE ORAL at 08:08

## 2023-08-23 RX ADMIN — METHYLPREDNISOLONE SODIUM SUCCINATE 125 MG: 125 INJECTION, POWDER, FOR SOLUTION INTRAMUSCULAR; INTRAVENOUS at 02:08

## 2023-08-23 RX ADMIN — POTASSIUM CHLORIDE 40 MEQ: 1500 TABLET, EXTENDED RELEASE ORAL at 10:08

## 2023-08-23 RX ADMIN — Medication 6 MG: at 08:08

## 2023-08-23 RX ADMIN — HYDROCODONE BITARTRATE AND ACETAMINOPHEN 1 TABLET: 10; 325 TABLET ORAL at 05:08

## 2023-08-23 RX ADMIN — ONDANSETRON 4 MG: 2 INJECTION INTRAMUSCULAR; INTRAVENOUS at 10:08

## 2023-08-23 RX ADMIN — MONTELUKAST 10 MG: 10 TABLET, FILM COATED ORAL at 08:08

## 2023-08-23 NOTE — CONSULTS
Cardiology Consult  8/23/2023  1:04 PM    Attending Cardiologist: Chandra May M.D.  Primary Care Provider: [facility]  Chief Complaint/Reason For Consultation:  Heart block      Problem list  Patient Active Problem List   Diagnosis    Essential hypertension    S/P MVR (mitral valve replacement)    Type 2 diabetes mellitus, without long-term current use of insulin    Severe obesity (BMI >= 40)    Chronic anticoagulation    Continuous tobacco abuse    COPD with acute exacerbation    Leukocytosis    Acute on chronic diastolic heart failure due to valvular disease    Sleep apnea    CHF (congestive heart failure), NYHA class III       CC:  Abdominal pain    HPI:  Delaney Scott is a 53 y.o.year-old female with PMH of CHF, mechanical MVR (severe MR) in 2015 at  (followed by [facility] cardiologist who manages her warfarin) who came in to the emergency department today with 2 day history of left lower quadrant abdominal pain.  She stated that she had sharp left lower quadrant abdominal pain started yesterday.  She had nausea.  She vomited 3 times.  She denies any bleeding.  She denies any chest pain or shortness of breath.  She is been compliant with the medications.  Her last INR check was 2.1 at [facility].  We were asked to see patient since she had abnormal EKG which sinus rhythm with second-degree type 1 AV block.  On monitor, she is in sinus rhythm with 1:1 conduction.  Initial workup in the emergency room showed hypokalemia 2.9.  Pelvic ultrasound showed 10 by 8 x 4 cm cystic mass.    Medications  Current Facility-Administered Medications   Medication Dose Route Frequency Provider Last Rate Last Admin    albuterol-ipratropium 2.5 mg-0.5 mg/3 mL nebulizer solution 3 mL  3 mL Nebulization Q5 Min Tammi Cummings PA-C         Current Outpatient Medications   Medication Sig Dispense Refill    albuterol (PROVENTIL/VENTOLIN HFA) 90 mcg/actuation inhaler Inhale 2 puffs into the lungs every 6 (six) hours as  needed.      atorvastatin (LIPITOR) 40 MG tablet Take 40 mg by mouth once daily.      carvediloL (COREG) 6.25 MG tablet Take 25 mg by mouth 2 (two) times daily with meals.      diclofenac sodium (VOLTAREN) 1 % Gel Apply 2 g topically 4 (four) times daily. 20 g 0    ENTRESTO 24-26 mg per tablet Take 1 tablet by mouth 2 (two) times daily.      FLOVENT  mcg/actuation inhaler Inhale 2 puffs into the lungs 2 (two) times daily.      fluticasone propionate (FLONASE) 50 mcg/actuation nasal spray 2 sprays by Each Nostril route.      furosemide (LASIX) 40 MG tablet Take 40 mg by mouth once daily.      JARDIANCE 10 mg tablet Take 10 mg by mouth.      loratadine (CLARITIN) 10 mg tablet Take 10 mg by mouth once daily.      montelukast (SINGULAIR) 10 mg tablet Take 10 mg by mouth every evening.      OZEMPIC 0.25 mg or 0.5 mg (2 mg/3 mL) pen injector Inject 0.5 mg into the skin every 7 days.      umeclidinium-vilanteroL (ANORO ELLIPTA) 62.5-25 mcg/actuation DsDv Inhale 1 puff into the lungs.      warfarin (COUMADIN) 5 MG tablet Take 1 tablet (5 mg total) by mouth Daily. (Patient taking differently: Take 5 mg by mouth. A whole tab (5MG) on sun, mon, wed, and fri     A half tab (2.5mg) on tues, thurs, and sat,) 30 tablet 0    albuterol (PROVENTIL) 2.5 mg /3 mL (0.083 %) nebulizer solution Take 3 mLs (2.5 mg total) by nebulization every 6 (six) hours as needed (For shortness of breath or wheezing). 90 mL 0    benzonatate (TESSALON) 100 MG capsule Take 1 capsule (100 mg total) by mouth 3 (three) times daily as needed for Cough. 15 capsule 0    blood sugar diagnostic Strp use once daily TO TEST BLOOD GLUCOSE. 50 each 0    blood-glucose meter Misc as directed 1 each 0    cyclobenzaprine (FLEXERIL) 10 MG tablet Take 10 mg by mouth 3 (three) times daily.      ferrous sulfate (FEOSOL) 325 mg (65 mg iron) Tab tablet Take 325 mg by mouth.      HYDROcodone-acetaminophen (NORCO) 5-325 mg per tablet Take 1 tablet by mouth every 6 (six)  hours as needed for Pain. 8 tablet 0    lancets 33 gauge Misc by Misc.(Non-Drug; Combo Route) route once daily. 100 each 0    LIDOcaine (LIDODERM) 5 % Place 1 patch onto the skin once daily. Remove & Discard patch within 12 hours or as directed by MD 5 patch 0    metFORMIN (GLUCOPHAGE) 1000 MG tablet Take 1,000 mg by mouth 2 (two) times daily with meals.      omeprazole (PRILOSEC) 20 MG capsule Take 1 capsule (20 mg total) by mouth 2 (two) times a day. 60 capsule 0    ondansetron (ZOFRAN-ODT) 4 MG TbDL Take 1 tablet (4 mg total) by mouth every 8 (eight) hours as needed (nausea). 30 tablet 0      Prior to Admission medications    Medication Sig Start Date End Date Taking? Authorizing Provider   albuterol (PROVENTIL/VENTOLIN HFA) 90 mcg/actuation inhaler Inhale 2 puffs into the lungs every 6 (six) hours as needed. 8/4/23  Yes Provider, Historical   atorvastatin (LIPITOR) 40 MG tablet Take 40 mg by mouth once daily.   Yes Provider, Historical   carvediloL (COREG) 6.25 MG tablet Take 25 mg by mouth 2 (two) times daily with meals.   Yes Provider, Historical   diclofenac sodium (VOLTAREN) 1 % Gel Apply 2 g topically 4 (four) times daily. 8/18/23  Yes Maeve Ascencio PA   ENTRESTO 24-26 mg per tablet Take 1 tablet by mouth 2 (two) times daily. 12/11/22  Yes Provider, Historical   FLOVENT  mcg/actuation inhaler Inhale 2 puffs into the lungs 2 (two) times daily. 8/5/23  Yes Provider, Historical   fluticasone propionate (FLONASE) 50 mcg/actuation nasal spray 2 sprays by Each Nostril route. 11/14/22  Yes Provider, Historical   furosemide (LASIX) 40 MG tablet Take 40 mg by mouth once daily. 9/10/21  Yes Provider, Historical   JARDIANCE 10 mg tablet Take 10 mg by mouth. 7/30/23  Yes Provider, Historical   loratadine (CLARITIN) 10 mg tablet Take 10 mg by mouth once daily. 10/18/21  Yes Provider, Historical   montelukast (SINGULAIR) 10 mg tablet Take 10 mg by mouth every evening. 8/7/23  Yes Provider, Historical    OZEMPIC 0.25 mg or 0.5 mg (2 mg/3 mL) pen injector Inject 0.5 mg into the skin every 7 days. 8/8/23  Yes Provider, Historical   umeclidinium-vilanteroL (ANORO ELLIPTA) 62.5-25 mcg/actuation DsDv Inhale 1 puff into the lungs.   Yes Provider, Historical   warfarin (COUMADIN) 5 MG tablet Take 1 tablet (5 mg total) by mouth Daily.  Patient taking differently: Take 5 mg by mouth. A whole tab (5MG) on sun, mon, wed, and fri     A half tab (2.5mg) on tues, thurs, and sat, 2/29/20 8/23/23 Yes Galilea Evans, BOBBI   albuterol (PROVENTIL) 2.5 mg /3 mL (0.083 %) nebulizer solution Take 3 mLs (2.5 mg total) by nebulization every 6 (six) hours as needed (For shortness of breath or wheezing). 12/16/22 12/16/23  Suzette Hardy PA-C   benzonatate (TESSALON) 100 MG capsule Take 1 capsule (100 mg total) by mouth 3 (three) times daily as needed for Cough. 12/16/22   Suzette Hardy PA-C   blood sugar diagnostic Strp use once daily TO TEST BLOOD GLUCOSE. 11/5/21   Latesha Dumas PA-C   blood-glucose meter Misc as directed 11/5/21   Latesha Dumas PA-C   cyclobenzaprine (FLEXERIL) 10 MG tablet Take 10 mg by mouth 3 (three) times daily.    Provider, Historical   ferrous sulfate (FEOSOL) 325 mg (65 mg iron) Tab tablet Take 325 mg by mouth.    Provider, Historical   HYDROcodone-acetaminophen (NORCO) 5-325 mg per tablet Take 1 tablet by mouth every 6 (six) hours as needed for Pain. 8/18/23   Maeve Ascencio PA   lancets 33 gauge Misc by Misc.(Non-Drug; Combo Route) route once daily. 11/5/21   Latesha Dumas PA-C   LIDOcaine (LIDODERM) 5 % Place 1 patch onto the skin once daily. Remove & Discard patch within 12 hours or as directed by MD 12/16/22   Suzette Hardy, ANNABELLE   metFORMIN (GLUCOPHAGE) 1000 MG tablet Take 1,000 mg by mouth 2 (two) times daily with meals.    Provider, Historical   omeprazole (PRILOSEC) 20 MG capsule Take 1 capsule (20 mg total) by mouth 2 (two) times a day. 11/5/21 11/5/22   Latesha Dumas, ANNABELLE   ondansetron (ZOFRAN-ODT) 4 MG TbDL Take 1 tablet (4 mg total) by mouth every 8 (eight) hours as needed (nausea). 1/7/22   Galilea Gutierrez MD         History  Past Medical History:   Diagnosis Date    Anticoagulant long-term use     Arthritis     Asthma     CHF (congestive heart failure), NYHA class III 12/19/2014    COPD with acute exacerbation     Diabetes mellitus     Hypertension      Past Surgical History:   Procedure Laterality Date    CARDIAC SURGERY      MITRAL VALVE REPLACEMENT  2015    [place] mechanical valve    TUBAL LIGATION       Social History     Socioeconomic History    Marital status: Single   Tobacco Use    Smoking status: Former     Current packs/day: 0.00     Types: Cigarettes    Smokeless tobacco: Never   Substance and Sexual Activity    Alcohol use: No    Drug use: No         Allergies  Review of patient's allergies indicates:  No Known Allergies      Review of Systems   Review of Systems   Constitutional: Positive for decreased appetite (since starting ozempic). Negative for fever and weight loss.   HENT:  Negative for congestion and nosebleeds.    Eyes:  Negative for double vision, vision loss in left eye, vision loss in right eye and visual disturbance.   Cardiovascular:  Negative for chest pain, claudication, cyanosis, dyspnea on exertion, irregular heartbeat, leg swelling, near-syncope, orthopnea, palpitations, paroxysmal nocturnal dyspnea and syncope.   Respiratory:  Negative for cough, hemoptysis, shortness of breath, sleep disturbances due to breathing, snoring, sputum production and wheezing.    Endocrine: Negative for cold intolerance and heat intolerance.   Skin:  Negative for nail changes and rash.   Musculoskeletal:  Negative for joint pain, muscle cramps, muscle weakness and myalgias.   Gastrointestinal:  Positive for abdominal pain, nausea and vomiting. Negative for change in bowel habit, heartburn, hematemesis, hematochezia, hemorrhoids and melena.  "  Neurological:  Negative for dizziness, focal weakness and headaches.         Physical Exam  Wt Readings from Last 1 Encounters:   08/23/23 (!) 137 kg (302 lb)     BP Readings from Last 3 Encounters:   08/23/23 104/60   08/18/23 (!) 106/58   12/16/22 127/62     Pulse Readings from Last 1 Encounters:   08/23/23 71     Body mass index is 45.92 kg/m².    Physical Exam  Vitals reviewed.   Constitutional:       Appearance: She is obese.   Cardiovascular:      Rate and Rhythm: Normal rate and regular rhythm.      Pulses:           Carotid pulses are 2+ on the right side and 2+ on the left side.       Dorsalis pedis pulses are 2+ on the right side and 2+ on the left side.      Heart sounds: No murmur heard.     Comments: Sternal scar.  Metallic valve click present.  Musculoskeletal:      Right lower leg: No edema.      Left lower leg: No edema.   Neurological:      Mental Status: She is alert.           Laboratory:  Trended Lab Data:  Recent Labs   Lab 08/23/23  0954   WBC 5.54   HGB 11.1*   HCT 38.0          Recent Labs   Lab 08/23/23  0954      K 2.9*   CL 97   CO2 32*   BUN 11   GLU 99   CALCIUM 9.4   MG 2.0       Recent Labs   Lab 08/23/23  0954   PROT 7.9   ALBUMIN 3.4*   BILITOT 0.5   AST 14   ALT 8*   ALKPHOS 109       Recent Labs   Lab 08/18/23  0817 08/23/23  0954   INR 2.1* 1.9*       Cardiac: No results for input(s): "TROPONINI", "CKTOTAL", "CKMB", "BNP" in the last 168 hours.    FLP: No results found for: "CHOL", "HDL", "LDLCALC", "TRIG", "CHOLHDL"  DM:   Lab Results   Component Value Date    HGBA1C 5.7 (H) 11/03/2021    HGBA1C 5.8 (H) 02/27/2020    CREATININE 1.1 08/23/2023     Thyroid:   Lab Results   Component Value Date    TSH 0.788 12/16/2022     Anemia:   Lab Results   Component Value Date    FERRITIN 224 01/07/2022     Urinalysis:   Lab Results   Component Value Date    COLORU Colorless (A) 08/23/2023    SPECGRAV 1.010 08/23/2023    NITRITE Negative 08/23/2023    KETONESU Negative " 08/23/2023    UROBILINOGEN Negative 08/23/2023     @    Other Results:  EKG (my interpretation):    TELEMETRY:  sinus    Echo: No results found for this or any previous visit.    Radiology:  US Pelvis Comp with Transvag NON-OB (xpd    Result Date: 8/23/2023  EXAMINATION: US PELVIS COMP WITH TRANSVAG NON-OB (XPD) CLINICAL HISTORY: ovarian mass on CT; TECHNIQUE: Transabdominal sonography of the pelvis was performed, followed by transvaginal sonography to better evaluate the uterus and ovaries. COMPARISON: CT abdomen and pelvis earlier today FINDINGS: Uterus: Size: 8.8 x 4.2 x 5.3 cm The parenchyma is homogeneous.  Endometrium is normal caliber measuring 0.5 cm. Right ovary: Not visualized Left ovary: Not visualized Free Fluid: None.     Neither ovary identified.  Within the adnexa is a septated cystic mass measuring 10.1 x 8.5 x 4.3 cm.  Poorly evaluated on this exam.  Correlation with female pelvis MRI without and with contrast recommended as an outpatient. This report was flagged in Epic as abnormal. Electronically signed by: Elena Cee Date:    08/23/2023 Time:    12:59    CT Abdomen Pelvis With Contrast    Result Date: 8/23/2023  EXAMINATION: CT ABDOMEN PELVIS WITH CONTRAST CLINICAL HISTORY: LLQ abdominal pain; TECHNIQUE: Low dose axial images, sagittal and coronal reformations were obtained from the lung bases to the pubic symphysis following the IV administration of 100 mL of Omnipaque 350 .  Oral contrast was not given. COMPARISON: None. FINDINGS: There are no pleural effusions.  There is no evidence of a pneumothorax.  No airspace opacity is present.  No pulmonary nodules identified. There are postop changes of prior mitral valve replacement.  The heart is unremarkable.  There is normal tapering of the abdominal aorta.  The aortic branch vessels are within normal limits. There is no lymphadenopathy in the abdomen or pelvis. The esophagus, stomach, and duodenum are within normal limits.  The small bowel is  unremarkable.  The appendix is unremarkable.  There is colonic diverticula without evidence of acute diverticulitis. The liver is unremarkable.  The gallbladder is within normal limits.  The biliary tree is within normal limits.  The spleen is within normal limits.  The pancreas is unremarkable.  The adrenal glands are unremarkable. The kidneys are unremarkable.  The uterus and urinary bladder are unremarkable.  The uterus is within normal limits.  There is at 10.7 x 8.6 cm mass in the left adnexa with fluid and soft tissue component.  This is incompletely characterized. There is no evidence of free fluid in the abdomen or pelvis.  There is no evidence of free air.  There is no evidence of pneumatosis.  No portal venous air is identified. The psoas margins are unremarkable.  There is an umbilical hernia containing omental fat.  The remaining abdominal wall is unremarkable.     10.7 x 8.6 cm mass in the left adnexa with both fluid and soft tissue component.  Initial evaluation with pelvic ultrasound may be obtained for further evaluation. Scattered colonic diverticula without evidence of acute diverticulitis. This report was flagged in Epic as abnormal. Electronically signed by: Jeffrey Rose MD Date:    08/23/2023 Time:    11:26    X-Ray Chest AP Portable    Result Date: 8/23/2023  EXAMINATION: XR CHEST AP PORTABLE CLINICAL HISTORY: Wheezing TECHNIQUE: Single frontal view of the chest was performed. COMPARISON: 12/15/2022 FINDINGS: cardiac size is within normal limits. Wire sutures seen in the sternum and patient has had a valvular replacement. Lungs are clear no vascular congestion infiltrate or pleural effusion is noted.     See above Electronically signed by: Reilly Rai MD Date:    08/23/2023 Time:    09:47    X-Ray Toe 2 or More Views Right    Result Date: 8/18/2023  EXAMINATION: XR TOE 2 OR MORE VIEWS RIGHT CLINICAL HISTORY: pain; TECHNIQUE: XR TOE 2 OR MORE VIEWS RIGHT COMPARISON: None FINDINGS: There is  an oblique fracture the distal aspect proximal phalanx of the 2nd digit which extends into the PIP joint.  Minimal displacement without definite articular incongruity.     See above Electronically signed by: Pancho Byers Jr Date:    08/18/2023 Time:    10:34        Current Medications:     Infusions:       Scheduled:   albuterol-ipratropium  3 mL Nebulization Q5 Min        PRN:        Assessment and Plan:  H/o mechanical MVR, on warfarin  -INR goal 2.5 to 3.5.  will need anticoagulation bridging if any procedure is needed.  Will need antibiotic prophylaxis.    Second-degree type 1 AV block (Wenckebach), improved  -replace electrolytes and monitor on telemetry    Adnexal mass  -as per primary team    Thank you for allowing me to participate in the care of Delaney Scott.      Chandra May MD, F.A.C.C, F.S.C.A.I.    Disclaimer: This document was created using voice recognition software (M*Modal Fluency Direct). Although it may be edited, this document may contain errors related to incorrect recognition of the spoken word. Please call the physician if clarification is needed.

## 2023-08-23 NOTE — ED PROVIDER NOTES
Source of History:  Patient     Chief complaint:  Back Pain (Reports back pain that radiates to the left side, + vomiting. Denies UTI symptoms )      HPI:  Delaney Scott is a 53 y.o. female with a past medical history of asthma, CHF, COPD, diabetes, hypertension, long-term use warfarin status post mitral valve replacement presenting to the emergency department reporting left lower abdominal pain radiating to her left back with associated nausea and vomiting.  Patient states that yesterday upon awakening from a nap, she was having pain to the left side.  States that she thought it may be gas pain, took a gas pill and some Tylenol.  Reports that the pain has not improved.  States that she was unable to sleep well last night secondary to pain.  Reports 3 episodes of vomiting since pain began.  Reports she is currently having shortness of breath and wheezing consistent with her asthma, did not take her inhalers this morning due to presenting here for pain.  Denies any chest pain.  Denies any fever, chills.  Does report some mild urinary hesitancy this morning, denies dysuria, urinary frequency.  Reports last bowel movement was 2 days ago, was diarrhea which is normal for her.  Denies any blood in her vomit or stool.  Denies any vaginal bleeding, vaginal symptoms.  Reports compliance with all medications.    This is the extent to the patients complaints today here in the emergency department.    PMH:  As per HPI and below:  Past Medical History:   Diagnosis Date    Anticoagulant long-term use     Arthritis     Asthma     CHF (congestive heart failure), NYHA class III 12/19/2014    COPD with acute exacerbation     Diabetes mellitus     Hypertension      Past Surgical History:   Procedure Laterality Date    CARDIAC SURGERY      MITRAL VALVE REPLACEMENT  2015    [facility] mechanical valve    TUBAL LIGATION         Social History     Tobacco Use    Smoking status: Former     Current packs/day: 0.00     Types: Cigarettes  "   Smokeless tobacco: Never   Substance Use Topics    Alcohol use: No    Drug use: No       Review of patient's allergies indicates:  No Known Allergies    ROS: As per HPI and below:  General: No fever.  No chills.  ENT: No sore throat. No ear pain  Chest:  Positive for shortness of breath.  No cough.    Cardiovascular: No chest pain.   Abdomen:  Positive for abdominal pain, nausea, vomiting.  Genito-Urinary:  Positive for urinary hesitancy   Neurologic: No focal weakness.  No numbness.  No headache  MSK:  Positive for left back pain.  Integument: No rashes or lesions.    Physical Exam:    BP (!) 117/56 (BP Location: Right arm, Patient Position: Lying)   Pulse 79   Temp 97.5 °F (36.4 °C) (Oral)   Resp 18   Ht 5' 8" (1.727 m)   Wt (!) 137 kg (302 lb)   LMP 01/12/2023   SpO2 (!) 90%   BMI 45.92 kg/m²   Vitals:    08/23/23 1640 08/23/23 1645 08/23/23 1710 08/23/23 1801   BP: 111/62      Pulse:  79     Resp:  (!) 26 18    Temp:       TempSrc:       SpO2:    (!) 90%   Weight:       Height:        08/23/23 1804   BP: (!) 117/56   Pulse:    Resp:    Temp:    TempSrc:    SpO2:    Weight:    Height:        Nursing note and vital signs reviewed.  Appearance:  Obese female, appears to be in mild discomfort.  No acute distress. Well-appearing. Non-toxic.    Eyes: No conjunctival injection.  Extraocular muscles are intact.  Chest/ Respiratory: Diffuse expiratory wheezing throughout lung fields with occasional inspiratory wheeze.   Cardiovascular: Regular rate and rhythm.  No murmurs. No gallops. No rubs. No pedal edema.  Abdomen: Soft. Obese abdomen. Tenderness to palpation isolated to left lower quadrant with mild guarding present. No tenderness elsewhere. No palpable masses, no rebound tenderness.  Back:  No CVA tenderness.  Musculoskeletal: Good range of motion all joints.  No deformities.  Neck supple.  No meningismus.  Skin: No rashes seen.  Good turgor.  No abrasions.  No ecchymoses.  Neuro: alert and oriented x3,  " no focal neurological deficits.  Psych: Appropriate, conversant    Initial MDM:  53-year-old female with past medical history significant for asthma, CHF, COPD, diabetes, hypertension, long-term use warfarin status post mitral valve replacement  presenting for evaluation of abdominal pain with associated nausea and vomiting x1 day.  On arrival, patient is afebrile and hemodynamically stable.  Does endorse some shortness of breath on exam, reports chronic from asthma and COPD.  Has not taken prescribed inhalers this morning.  Diffuse wheezing present on exam, we will give breathing treatment.  Patient reports no relief in pain with Tylenol, we will give dose of Norco for pain.    Labs Reviewed   URINALYSIS, REFLEX TO URINE CULTURE - Abnormal; Notable for the following components:       Result Value    Color, UA Colorless (*)     Glucose, UA 3+ (*)     Leukocytes, UA 1+ (*)     All other components within normal limits    Narrative:     Specimen Source->Urine   CBC W/ AUTO DIFFERENTIAL - Abnormal; Notable for the following components:    Hemoglobin 11.1 (*)     MCHC 29.2 (*)     RDW 17.2 (*)     Lymph # 0.9 (*)     Gran % 76.0 (*)     Lymph % 15.5 (*)     All other components within normal limits   COMPREHENSIVE METABOLIC PANEL - Abnormal; Notable for the following components:    Potassium 2.9 (*)     CO2 32 (*)     Albumin 3.4 (*)     ALT 8 (*)     All other components within normal limits   PROTIME-INR - Abnormal; Notable for the following components:    Prothrombin Time 20.4 (*)     INR 1.9 (*)     All other components within normal limits   URINALYSIS MICROSCOPIC - Abnormal; Notable for the following components:    WBC, UA 11 (*)     All other components within normal limits    Narrative:     Specimen Source->Urine   ISTAT PROCEDURE - Abnormal; Notable for the following components:    POC PCO2 55.8 (*)     POC PO2 18 (*)     POC HCO3 35.4 (*)     POC SATURATED O2 25 (*)     POC TCO2 37 (*)     All other components  within normal limits   CULTURE, URINE   LIPASE   MAGNESIUM   MAGNESIUM   B-TYPE NATRIURETIC PEPTIDE   PROCALCITONIN   POCT GLUCOSE   POCT GLUCOSE MONITORING CONTINUOUS       US Pelvis Comp with Transvag NON-OB (xpd   Final Result   Abnormal      Neither ovary identified.  Within the adnexa is a septated cystic mass measuring 10.1 x 8.5 x 4.3 cm.  Poorly evaluated on this exam.  Correlation with female pelvis MRI without and with contrast recommended as an outpatient.      This report was flagged in Epic as abnormal.         Electronically signed by: Elena Cee   Date:    08/23/2023   Time:    12:59      CT Abdomen Pelvis With Contrast   Final Result   Abnormal      10.7 x 8.6 cm mass in the left adnexa with both fluid and soft tissue component.  Initial evaluation with pelvic ultrasound may be obtained for further evaluation.      Scattered colonic diverticula without evidence of acute diverticulitis.      This report was flagged in Epic as abnormal.         Electronically signed by: Jeffrey Rose MD   Date:    08/23/2023   Time:    11:26      X-Ray Chest AP Portable   Final Result      See above         Electronically signed by: Reilly Rai MD   Date:    08/23/2023   Time:    09:47            Initial Impression/ Differential Dx:  Differential diagnosis includes but not limited to: GERD, intestinal spasm, gastroenteritis, gastritis, ulcer, cholecystitis, cholelithiasis, gallstones, pancreatitis, ileus, small bowel obstruction, appendicitis, diverticulitis, colitis, constipation, intestinal gas pain, ovarian cyst, STD/PID/TOA    Critical Care    Date/Time: 8/23/2023 6:16 PM    Performed by: Tammi Cummings PA-C  Authorized by: Bharati Jones MD  Direct patient critical care time: 10 minutes  Additional history critical care time: 5 minutes  Ordering / reviewing critical care time: 10 minutes  Documentation critical care time: 6 minutes  Consulting other physicians critical care time: 5 minutes  Total  "critical care time (exclusive of procedural time) : 36 minutes  Critical care was necessary to treat or prevent imminent or life-threatening deterioration of the following conditions: cardiac failure and respiratory failure.  Critical care was time spent personally by me on the following activities: development of treatment plan with patient or surrogate, discussions with consultants, evaluation of patient's response to treatment, examination of patient, obtaining history from patient or surrogate, ordering and performing treatments and interventions, ordering and review of laboratory studies, ordering and review of radiographic studies, pulse oximetry, re-evaluation of patient's condition and review of old charts.           MDM:      ED Course as of 08/23/23 1818   Wed Aug 23, 2023   1026 POCT Glucose: 96 [SW]   1028 CXR results of "cardiac size is within normal limits. Wire sutures seen in the sternum and patient has had a valvular replacement. Lungs are clear no vascular congestion infiltrate or pleural effusion is noted." [SW]   1029 Potassium(!): 2.9  Will replete and check mag. [SW]   1029 INR(!): 1.9  Subtherapeutic [SW]   1029 EKG with abnormal rhythm, seems consistent with second degree AV block Mobitz Type 2. No ST elevations, depressions. T wave inversions to leads V 2, V3, more pronounced than prior. Discussed EKG results with my attending provider, Dr. Jones, and we spoke with cardiology. Dr May with interpretation of rhythm as second degree AV block Type 1. Will plan to admit and consult cardiology for further evaluation.[SW]   1031 CMP normal renal function, no elevation in liver enzymes. [SW]   1031 CBC notable for mild anemia consistent with baseline. [SW]   1032 UA with 1+ leukocytes, 11 WBC on microscopy.  Patient is asymptomatic at this time. Culture pending. No indication for treatment at this time.  [SW]   1154 Magnesium: 2.0 [SW]   1155 CT abdomen pelvis with results of "10.7 x 8.6 cm mass in " "the left adnexa with both fluid and soft tissue component.  Initial evaluation with pelvic ultrasound may be obtained for further evaluation." Will obtain pelvic ultrasound. [SW]   1156 Informed by staff that patient's heart rate was bradycardic in the 40s. Repeat EKG obtained at 1042 that was notable for continued AV block, rate of 43. Patient was asymptomatic at that time per nursing staff. Patient then began to desaturate into the 70s, placed on supplemental O2. Is now sattting at 100%. Patient with history of COPD, asthma, no complaints of chest pain or shortness of breath at this time. Lungs remain clear to ausculation. Will give repeat breathing treatment given hypoxia. [SW]   1326 Ultrasound results of "Neither ovary identified.  Within the adnexa is a septated cystic mass measuring 10.1 x 8.5 x 4.3 cm.  Poorly evaluated on this exam.  Correlation with female pelvis MRI without and with contrast recommended as an outpatient." Patient being admitted given new onset AV block, COPD exacerbation. Recommend gyn onc consult inpatient for further evaluation.[SW]   1434 Spoke with hospitalist, we will admit patient for treatment of COPD exacerbation, cardiology evaluation for abnormal EKG. Will plan to consult gyn onc for evaluation of ovarian mass. Patient informed of plan and is agreeable.  [SW]      ED Course User Index  [SW] Tammi Cummings PA-C       Diagnostic Impression:    1. COPD with acute exacerbation    2. Wheezing    3. Ovarian mass, left    4. Hypokalemia         ED Disposition Condition    Admit Stable                    Tammi Cummings PA-C  08/23/23 1843       Tammi Cummings PA-C  08/23/23 1844       Tammi Cummings PA-C  08/23/23 1850    "

## 2023-08-23 NOTE — ASSESSMENT & PLAN NOTE
Delaney Scott presented with abdominal pain, see pelvic mass below; in ER she was hypoxic 80% ORA requiring 2L NC. CXR unremarkable, BNP pending. Given IV solumedrol and albuterol with improvement    - COPD pathway started: prednisone 40mg PO daily x 5 days, scheduled duonebs, mucinex 600mg PO BID, incentive spirometry q6H  - sputum culture and procal pending  - wean oxygen as tolerated, 6 MWT prior to dc  - continue home controllers  - follows with pulm outpatient

## 2023-08-23 NOTE — HPI
"Per Suzette Hardy PA-C ([facility]):    "Ms Baltazar is a 53 year old woman with a past medical history that includes COPD, asthma, heart failure, diabetes type 2, hypertension, mitral valve replacement on warfarin for left lower quadrant pain that began yesterday. She describes the pain as sharp and constant, the norco in the ER provided mild relief. Denies N/V/D, last BM was on Monday. She denies vaginal bleeding, vaginal discharge. No history of GYN malignancy that she is aware of. Surgical history includes 2 c-sections. She also notes some wheezing and shortness of breath with exertion. No chest pain, fevers, chills.     In ER: afebrile, o2 sats 80% ORA, requiring 2L NC to maintain sats; VBG with pH 7.41, CO2 55.8; CMP shows K 2.9; INR 1.9; CXR unremarkable; CT AP reveals "10.7 x 8.6 cm mass in the left adnexa with both fluid and soft tissue component." Pelvic US: "Within the adnexa is a septated cystic mass measuring 10.1 x 8.5 x 4.3 cm." She also had an EKG which sinus rhythm with second-degree type 1 AV block; cardiology consulted: replace electrolytes and monitor"  "

## 2023-08-23 NOTE — ASSESSMENT & PLAN NOTE
Presenting with lower left abdominal pain, sharp and constant. Reports being told previously that she had a cyst, but does not appear to have been seen by GYN  CT AP with 10.7 x 8.6 cm mass in the left adnexa with both fluid and soft tissue component.  Initial evaluation with pelvic ultrasound may be obtained for further evaluation.   Pelvic US with Neither ovary identified.  Within the adnexa is a septated cystic mass measuring 10.1 x 8.5 x 4.3 cm.  Poorly evaluated on this exam.  Correlation with female pelvis MRI without and with contrast recommended as an outpatient.   Will consult GYN ONC for further assistance

## 2023-08-23 NOTE — Clinical Note
Diagnosis: COPD with acute exacerbation [[id number]]   Admitting Provider:: JAJA FRAZIER [[id number]]   Future Attending Provider: JIMENEZ MCCURDY [[id number]]   Reason for IP Medical Treatment  (Clinical interventions that can only be accomplished in the IP setting? ) :: oxygen supplementation, breathing tx, steroids, cardiology evaluation   I certify that Inpatient services for greater than or equal to 2 midnights are medically necessary:: Yes   Plans for Post-Acute care--if anticipated (pick the single best option):: A. No post acute care anticipated at this time

## 2023-08-23 NOTE — ASSESSMENT & PLAN NOTE
Last hgb a1c 5.7 on 11/2021  Hold home metformin 1000mg BID  Glucose 99 on admission  Diabetic diet, low dose SSI  Glucose checks ACHS, might need scheduled insulin with steroid use

## 2023-08-23 NOTE — ASSESSMENT & PLAN NOTE
Last echo in 2022 with cCHFrEF 40-45%  CXR unremarkable  BNP pending  Cardiac fluid restricted diet  Strict I/Os  Continue GDMT with entresto 24-26mg daily, lasix 40mg daily

## 2023-08-23 NOTE — H&P
"[facility] Medicine  History & Physical    Patient Name: Delaney Scott  MRN: [id number]  Patient Class: IP- Inpatient  Admission Date: 8/23/2023  Attending Physician: Duy Moy MD   Primary Care Provider: [facility]         Patient information was obtained from patient, past medical records and ER records.     Subjective:     Principal Problem:COPD with acute exacerbation    Chief Complaint:   Chief Complaint   Patient presents with    Back Pain     Reports back pain that radiates to the left side, + vomiting. Denies UTI symptoms         HPI: Ms Baltazar is a 52 year old woman with a past medical history that includes COPD, asthma, heart failure, diabetes type 2, hypertension, mitral valve replacement on warfarin for left lower quadrant pain that began yesterday. She describes the pain as sharp and constant, the norco in the ER provided mild relief. Denies N/V/D, last BM was on Monday. She denies vaginal bleeding, vaginal discharge. No history of GYN malignancy that she is aware of. Surgical history includes 2 c-sections. She also notes some wheezing and shortness of breath with exertion. No chest pain, fevers, chills.     In ER: afebrile, o2 sats 80% ORA, requiring 2L NC to maintain sats; VBG with pH 7.41, CO2 55.8; CMP shows K 2.9; INR 1.9; CXR unremarkable; CT AP reveals "10.7 x 8.6 cm mass in the left adnexa with both fluid and soft tissue component." Pelvic US: "Within the adnexa is a septated cystic mass measuring 10.1 x 8.5 x 4.3 cm." She also had an EKG which sinus rhythm with second-degree type 1 AV block; cardiology consulted: replace electrolytes and monitor      Past Medical History:   Diagnosis Date    Anticoagulant long-term use     Arthritis     Asthma     CHF (congestive heart failure), NYHA class III 12/19/2014    COPD with acute exacerbation     Diabetes mellitus     Hypertension        Past Surgical History:   Procedure Laterality Date    " CARDIAC SURGERY      MITRAL VALVE REPLACEMENT  2015    [employer] mechanical valve    TUBAL LIGATION         Review of patient's allergies indicates:  No Known Allergies    No current facility-administered medications on file prior to encounter.     Current Outpatient Medications on File Prior to Encounter   Medication Sig    albuterol (PROVENTIL/VENTOLIN HFA) 90 mcg/actuation inhaler Inhale 2 puffs into the lungs every 6 (six) hours as needed.    atorvastatin (LIPITOR) 40 MG tablet Take 40 mg by mouth once daily.    carvediloL (COREG) 6.25 MG tablet Take 25 mg by mouth 2 (two) times daily with meals.    diclofenac sodium (VOLTAREN) 1 % Gel Apply 2 g topically 4 (four) times daily.    ENTRESTO 24-26 mg per tablet Take 1 tablet by mouth 2 (two) times daily.    FLOVENT  mcg/actuation inhaler Inhale 2 puffs into the lungs 2 (two) times daily.    fluticasone propionate (FLONASE) 50 mcg/actuation nasal spray 2 sprays by Each Nostril route.    furosemide (LASIX) 40 MG tablet Take 40 mg by mouth once daily.    JARDIANCE 10 mg tablet Take 10 mg by mouth.    loratadine (CLARITIN) 10 mg tablet Take 10 mg by mouth once daily.    montelukast (SINGULAIR) 10 mg tablet Take 10 mg by mouth every evening.    OZEMPIC 0.25 mg or 0.5 mg (2 mg/3 mL) pen injector Inject 0.5 mg into the skin every 7 days.    umeclidinium-vilanteroL (ANORO ELLIPTA) 62.5-25 mcg/actuation DsDv Inhale 1 puff into the lungs.    warfarin (COUMADIN) 5 MG tablet Take 1 tablet (5 mg total) by mouth Daily. (Patient taking differently: Take 5 mg by mouth. A whole tab (5MG) on sun, mon, wed, and fri     A half tab (2.5mg) on tues, thurs, and sat,)    albuterol (PROVENTIL) 2.5 mg /3 mL (0.083 %) nebulizer solution Take 3 mLs (2.5 mg total) by nebulization every 6 (six) hours as needed (For shortness of breath or wheezing).    blood sugar diagnostic Strp use once daily TO TEST BLOOD GLUCOSE.    blood-glucose meter Misc as directed     cyclobenzaprine (FLEXERIL) 10 MG tablet Take 10 mg by mouth 3 (three) times daily.    docusate sodium (COLACE) 100 MG capsule Take 1 capsule by mouth 2 (two) times daily.    HYDROcodone-acetaminophen (NORCO) 5-325 mg per tablet Take 1 tablet by mouth every 6 (six) hours as needed for Pain.    lancets 33 gauge Misc by Misc.(Non-Drug; Combo Route) route once daily.    LIDOcaine (LIDODERM) 5 % Place 1 patch onto the skin once daily. Remove & Discard patch within 12 hours or as directed by MD    omeprazole (PRILOSEC) 20 MG capsule Take 1 capsule (20 mg total) by mouth 2 (two) times a day.    ondansetron (ZOFRAN-ODT) 4 MG TbDL Take 1 tablet (4 mg total) by mouth every 8 (eight) hours as needed (nausea).    [DISCONTINUED] benzonatate (TESSALON) 100 MG capsule Take 1 capsule (100 mg total) by mouth 3 (three) times daily as needed for Cough.    [DISCONTINUED] ferrous sulfate (FEOSOL) 325 mg (65 mg iron) Tab tablet Take 325 mg by mouth.    [DISCONTINUED] gabapentin (NEURONTIN) 100 MG capsule Take 100 mg by mouth once daily.    [DISCONTINUED] metFORMIN (GLUCOPHAGE) 1000 MG tablet Take 1,000 mg by mouth 2 (two) times daily with meals.     Family History       Problem Relation (Age of Onset)    Cancer Mother    Diabetes type II Mother    Hypertension Mother, Father          Tobacco Use    Smoking status: Former     Current packs/day: 0.00     Types: Cigarettes    Smokeless tobacco: Never   Substance and Sexual Activity    Alcohol use: No    Drug use: No    Sexual activity: Not on file     Review of Systems   Constitutional:  Negative for chills, diaphoresis, fatigue and fever.   Respiratory:  Positive for shortness of breath and wheezing. Negative for cough and chest tightness.    Cardiovascular:  Negative for chest pain, palpitations and leg swelling.   Gastrointestinal:  Positive for abdominal pain (left lower/suprapubic). Negative for blood in stool, diarrhea, nausea and vomiting.   Genitourinary:  Positive  for pelvic pain. Negative for decreased urine volume, difficulty urinating, hematuria, urgency, vaginal bleeding, vaginal discharge and vaginal pain.   Musculoskeletal:  Negative for arthralgias.   Skin:  Negative for wound.   Neurological:  Negative for headaches.   Psychiatric/Behavioral:  Negative for agitation and confusion.      Objective:     Vital Signs (Most Recent):  Temp: 97.5 °F (36.4 °C) (08/23/23 0839)  Pulse: (!) 55 (08/23/23 1500)  Resp: 20 (08/23/23 1500)  BP: 104/60 (08/23/23 1200)  SpO2: (!) 91 % (08/23/23 1500) Vital Signs (24h Range):  Temp:  [97.5 °F (36.4 °C)] 97.5 °F (36.4 °C)  Pulse:  [45-83] 55  Resp:  [14-28] 20  SpO2:  [80 %-100 %] 91 %  BP: (102-142)/(56-70) 104/60     Weight: (!) 137 kg (302 lb)  Body mass index is 45.92 kg/m².     Physical Exam  Constitutional:       General: She is not in acute distress.     Appearance: She is well-developed. She is obese. She is not toxic-appearing or diaphoretic.   HENT:      Head: Normocephalic and atraumatic.   Cardiovascular:      Rate and Rhythm: Normal rate and regular rhythm.   Pulmonary:      Effort: Pulmonary effort is normal. No respiratory distress.      Breath sounds: Wheezing present.      Comments: Decreased breath sounds throughout due to body habitus, faint wheezes  Abdominal:      Palpations: Abdomen is soft.      Tenderness: There is abdominal tenderness (LLQ). There is no guarding.   Skin:     General: Skin is warm and dry.   Neurological:      General: No focal deficit present.      Mental Status: She is alert.   Psychiatric:         Mood and Affect: Mood normal.         Behavior: Behavior normal.                Significant Labs: All pertinent labs within the past 24 hours have been reviewed.    Significant Imaging: I have reviewed all pertinent imaging results/findings within the past 24 hours.    Assessment/Plan:     * COPD with acute exacerbation  Delaney Scott presented with abdominal pain, see pelvic mass below; in ER she  was hypoxic 80% ORA requiring 2L NC. CXR unremarkable, BNP pending. Given IV solumedrol and albuterol with improvement    - COPD pathway started: prednisone 40mg PO daily x 5 days, scheduled duonebs, mucinex 600mg PO BID, incentive spirometry q6H  - sputum culture and procal pending  - wean oxygen as tolerated, 6 MWT prior to dc  - continue home controllers  - follows with pulm outpatient       Mobitz type 1 second degree atrioventricular block  replace electrolytes and monitor on telemetry  Holding home coreg for now       Pelvic mass  Presenting with lower left abdominal pain, sharp and constant. Reports being told previously that she had a cyst, but does not appear to have been seen by GYN  CT AP with 10.7 x 8.6 cm mass in the left adnexa with both fluid and soft tissue component.  Initial evaluation with pelvic ultrasound may be obtained for further evaluation.   Pelvic US with Neither ovary identified.  Within the adnexa is a septated cystic mass measuring 10.1 x 8.5 x 4.3 cm.  Poorly evaluated on this exam.  Correlation with female pelvis MRI without and with contrast recommended as an outpatient.   Will consult GYN ONC for further assistance       CHF (congestive heart failure), NYHA class III  Last echo in 2022 with cCHFrEF 40-45%  CXR unremarkable  BNP pending  Cardiac fluid restricted diet  Strict I/Os  Continue GDMT with entresto 24-26mg daily, lasix 40mg daily     S/P MVR (mitral valve replacement)  Continue home coumadin 5mg daily except on Wed 7.5mg   Daily INR  1.9 on admission  INR goal 2.5 to 3.5  Coumadin diet      Essential hypertension  Continue home entresto 24-26mg PO daily   Holding home coreg for now with AV block      Sleep apnea  Continue CPAP      Type 2 diabetes mellitus, without long-term current use of insulin  Last hgb a1c 5.7 on 11/2021  Hold home metformin 1000mg BID  Glucose 99 on admission  Diabetic diet, low dose SSI  Glucose checks ACHS, might need scheduled insulin with steroid  use      VTE Risk Mitigation (From admission, onward)         Ordered     warfarin (COUMADIN) tablet 5 mg  Daily         08/23/23 1533     warfarin (COUMADIN) tablet 7.5 mg  Once         08/23/23 1533     IP VTE HIGH RISK PATIENT  Once         08/23/23 1455     Place sequential compression device  Until discontinued         08/23/23 1455                           Suzette Hardy PA-C  Department of Hospital Medicine  [facility] - Emergency Dept

## 2023-08-23 NOTE — NURSING
..Nurses Note -- 4 Eyes      8/23/2023   5:59 PM      Skin assessed during: Admit      [x] No Altered Skin Integrity Present    []Prevention Measures Documented      [] Yes- Altered Skin Integrity Present or Discovered   [] LDA Added if Not in Epic (Describe Wound)   [] New Altered Skin Integrity was Present on Admit and Documented in LDA   [] Wound Image Taken    Wound Care Consulted? No    Attending Nurse:   NASRA Chua    Second RN/Staff Member:  NASRA Del Toro

## 2023-08-23 NOTE — SUBJECTIVE & OBJECTIVE
Past Medical History:   Diagnosis Date    Anticoagulant long-term use     Arthritis     Asthma     CHF (congestive heart failure), NYHA class III 12/19/2014    COPD with acute exacerbation     Diabetes mellitus     Hypertension        Past Surgical History:   Procedure Laterality Date    CARDIAC SURGERY      MITRAL VALVE REPLACEMENT  2015    [employer] mechanical valve    TUBAL LIGATION         Review of patient's allergies indicates:  No Known Allergies    No current facility-administered medications on file prior to encounter.     Current Outpatient Medications on File Prior to Encounter   Medication Sig    albuterol (PROVENTIL/VENTOLIN HFA) 90 mcg/actuation inhaler Inhale 2 puffs into the lungs every 6 (six) hours as needed.    atorvastatin (LIPITOR) 40 MG tablet Take 40 mg by mouth once daily.    carvediloL (COREG) 6.25 MG tablet Take 25 mg by mouth 2 (two) times daily with meals.    diclofenac sodium (VOLTAREN) 1 % Gel Apply 2 g topically 4 (four) times daily.    ENTRESTO 24-26 mg per tablet Take 1 tablet by mouth 2 (two) times daily.    FLOVENT  mcg/actuation inhaler Inhale 2 puffs into the lungs 2 (two) times daily.    fluticasone propionate (FLONASE) 50 mcg/actuation nasal spray 2 sprays by Each Nostril route.    furosemide (LASIX) 40 MG tablet Take 40 mg by mouth once daily.    JARDIANCE 10 mg tablet Take 10 mg by mouth.    loratadine (CLARITIN) 10 mg tablet Take 10 mg by mouth once daily.    montelukast (SINGULAIR) 10 mg tablet Take 10 mg by mouth every evening.    OZEMPIC 0.25 mg or 0.5 mg (2 mg/3 mL) pen injector Inject 0.5 mg into the skin every 7 days.    umeclidinium-vilanteroL (ANORO ELLIPTA) 62.5-25 mcg/actuation DsDv Inhale 1 puff into the lungs.    warfarin (COUMADIN) 5 MG tablet Take 1 tablet (5 mg total) by mouth Daily. (Patient taking differently: Take 5 mg by mouth. A whole tab (5MG) on sun, mon, wed, and fri     A half tab (2.5mg) on tues, thurs, and sat,)    albuterol (PROVENTIL) 2.5 mg  /3 mL (0.083 %) nebulizer solution Take 3 mLs (2.5 mg total) by nebulization every 6 (six) hours as needed (For shortness of breath or wheezing).    blood sugar diagnostic Strp use once daily TO TEST BLOOD GLUCOSE.    blood-glucose meter Misc as directed    cyclobenzaprine (FLEXERIL) 10 MG tablet Take 10 mg by mouth 3 (three) times daily.    docusate sodium (COLACE) 100 MG capsule Take 1 capsule by mouth 2 (two) times daily.    HYDROcodone-acetaminophen (NORCO) 5-325 mg per tablet Take 1 tablet by mouth every 6 (six) hours as needed for Pain.    lancets 33 gauge Misc by Misc.(Non-Drug; Combo Route) route once daily.    LIDOcaine (LIDODERM) 5 % Place 1 patch onto the skin once daily. Remove & Discard patch within 12 hours or as directed by MD    omeprazole (PRILOSEC) 20 MG capsule Take 1 capsule (20 mg total) by mouth 2 (two) times a day.    ondansetron (ZOFRAN-ODT) 4 MG TbDL Take 1 tablet (4 mg total) by mouth every 8 (eight) hours as needed (nausea).    [DISCONTINUED] benzonatate (TESSALON) 100 MG capsule Take 1 capsule (100 mg total) by mouth 3 (three) times daily as needed for Cough.    [DISCONTINUED] ferrous sulfate (FEOSOL) 325 mg (65 mg iron) Tab tablet Take 325 mg by mouth.    [DISCONTINUED] gabapentin (NEURONTIN) 100 MG capsule Take 100 mg by mouth once daily.    [DISCONTINUED] metFORMIN (GLUCOPHAGE) 1000 MG tablet Take 1,000 mg by mouth 2 (two) times daily with meals.     Family History       Problem Relation (Age of Onset)    Cancer Mother    Diabetes type II Mother    Hypertension Mother, Father          Tobacco Use    Smoking status: Former     Current packs/day: 0.00     Types: Cigarettes    Smokeless tobacco: Never   Substance and Sexual Activity    Alcohol use: No    Drug use: No    Sexual activity: Not on file     Review of Systems   Constitutional:  Negative for chills, diaphoresis, fatigue and fever.   Respiratory:  Positive for shortness of breath and wheezing. Negative for cough and chest  tightness.    Cardiovascular:  Negative for chest pain, palpitations and leg swelling.   Gastrointestinal:  Positive for abdominal pain (left lower/suprapubic). Negative for blood in stool, diarrhea, nausea and vomiting.   Genitourinary:  Positive for pelvic pain. Negative for decreased urine volume, difficulty urinating, hematuria, urgency, vaginal bleeding, vaginal discharge and vaginal pain.   Musculoskeletal:  Negative for arthralgias.   Skin:  Negative for wound.   Neurological:  Negative for headaches.   Psychiatric/Behavioral:  Negative for agitation and confusion.      Objective:     Vital Signs (Most Recent):  Temp: 97.5 °F (36.4 °C) (08/23/23 0839)  Pulse: (!) 55 (08/23/23 1500)  Resp: 20 (08/23/23 1500)  BP: 104/60 (08/23/23 1200)  SpO2: (!) 91 % (08/23/23 1500) Vital Signs (24h Range):  Temp:  [97.5 °F (36.4 °C)] 97.5 °F (36.4 °C)  Pulse:  [45-83] 55  Resp:  [14-28] 20  SpO2:  [80 %-100 %] 91 %  BP: (102-142)/(56-70) 104/60     Weight: (!) 137 kg (302 lb)  Body mass index is 45.92 kg/m².     Physical Exam  Constitutional:       General: She is not in acute distress.     Appearance: She is well-developed. She is obese. She is not toxic-appearing or diaphoretic.   HENT:      Head: Normocephalic and atraumatic.   Cardiovascular:      Rate and Rhythm: Normal rate and regular rhythm.   Pulmonary:      Effort: Pulmonary effort is normal. No respiratory distress.      Breath sounds: Wheezing present.      Comments: Decreased breath sounds throughout due to body habitus, faint wheezes  Abdominal:      Palpations: Abdomen is soft.      Tenderness: There is abdominal tenderness (LLQ). There is no guarding.   Skin:     General: Skin is warm and dry.   Neurological:      General: No focal deficit present.      Mental Status: She is alert.   Psychiatric:         Mood and Affect: Mood normal.         Behavior: Behavior normal.                Significant Labs: All pertinent labs within the past 24 hours have been  reviewed.    Significant Imaging: I have reviewed all pertinent imaging results/findings within the past 24 hours.

## 2023-08-23 NOTE — ASSESSMENT & PLAN NOTE
Continue home coumadin 5mg daily except on Wed 7.5mg   Daily INR  1.9 on admission  INR goal 2.5 to 3.5  Coumadin diet

## 2023-08-24 VITALS
OXYGEN SATURATION: 93 % | WEIGHT: 293 LBS | HEART RATE: 81 BPM | BODY MASS INDEX: 44.41 KG/M2 | HEIGHT: 68 IN | RESPIRATION RATE: 19 BRPM | SYSTOLIC BLOOD PRESSURE: 123 MMHG | TEMPERATURE: 99 F | DIASTOLIC BLOOD PRESSURE: 56 MMHG

## 2023-08-24 LAB
ANION GAP SERPL CALC-SCNC: 9 MMOL/L (ref 8–16)
BACTERIA UR CULT: NORMAL
BACTERIA UR CULT: NORMAL
BASOPHILS # BLD AUTO: 0.01 K/UL (ref 0–0.2)
BASOPHILS NFR BLD: 0.2 % (ref 0–1.9)
BUN SERPL-MCNC: 12 MG/DL (ref 6–20)
CALCIUM SERPL-MCNC: 9.6 MG/DL (ref 8.7–10.5)
CHLORIDE SERPL-SCNC: 99 MMOL/L (ref 95–110)
CO2 SERPL-SCNC: 32 MMOL/L (ref 23–29)
CREAT SERPL-MCNC: 1 MG/DL (ref 0.5–1.4)
DIFFERENTIAL METHOD: ABNORMAL
EOSINOPHIL # BLD AUTO: 0 K/UL (ref 0–0.5)
EOSINOPHIL NFR BLD: 0 % (ref 0–8)
ERYTHROCYTE [DISTWIDTH] IN BLOOD BY AUTOMATED COUNT: 17.2 % (ref 11.5–14.5)
EST. GFR  (NO RACE VARIABLE): >60 ML/MIN/1.73 M^2
GLUCOSE SERPL-MCNC: 123 MG/DL (ref 70–110)
HCT VFR BLD AUTO: 38.9 % (ref 37–48.5)
HGB BLD-MCNC: 11.2 G/DL (ref 12–16)
IMM GRANULOCYTES # BLD AUTO: 0.02 K/UL (ref 0–0.04)
IMM GRANULOCYTES NFR BLD AUTO: 0.3 % (ref 0–0.5)
LYMPHOCYTES # BLD AUTO: 0.7 K/UL (ref 1–4.8)
LYMPHOCYTES NFR BLD: 10.8 % (ref 18–48)
MCH RBC QN AUTO: 26.8 PG (ref 27–31)
MCHC RBC AUTO-ENTMCNC: 28.8 G/DL (ref 32–36)
MCV RBC AUTO: 93 FL (ref 82–98)
MONOCYTES # BLD AUTO: 0.2 K/UL (ref 0.3–1)
MONOCYTES NFR BLD: 3.5 % (ref 4–15)
NEUTROPHILS # BLD AUTO: 5.6 K/UL (ref 1.8–7.7)
NEUTROPHILS NFR BLD: 85.2 % (ref 38–73)
NRBC BLD-RTO: 0 /100 WBC
PLATELET # BLD AUTO: 262 K/UL (ref 150–450)
PMV BLD AUTO: 10 FL (ref 9.2–12.9)
POTASSIUM SERPL-SCNC: 3.6 MMOL/L (ref 3.5–5.1)
PROCALCITONIN SERPL IA-MCNC: <0.02 NG/ML
RBC # BLD AUTO: 4.18 M/UL (ref 4–5.4)
SODIUM SERPL-SCNC: 140 MMOL/L (ref 136–145)
WBC # BLD AUTO: 6.59 K/UL (ref 3.9–12.7)

## 2023-08-24 PROCEDURE — 80048 BASIC METABOLIC PNL TOTAL CA: CPT | Performed by: PHYSICIAN ASSISTANT

## 2023-08-24 PROCEDURE — 25000003 PHARM REV CODE 250: Performed by: PHYSICIAN ASSISTANT

## 2023-08-24 PROCEDURE — 99232 SBSQ HOSP IP/OBS MODERATE 35: CPT | Mod: ,,, | Performed by: INTERNAL MEDICINE

## 2023-08-24 PROCEDURE — 99233 PR SUBSEQUENT HOSPITAL CARE,LEVL III: ICD-10-PCS | Mod: ,,, | Performed by: PHYSICIAN ASSISTANT

## 2023-08-24 PROCEDURE — 99232 PR SUBSEQUENT HOSPITAL CARE,LEVL II: ICD-10-PCS | Mod: ,,, | Performed by: INTERNAL MEDICINE

## 2023-08-24 PROCEDURE — 94640 AIRWAY INHALATION TREATMENT: CPT

## 2023-08-24 PROCEDURE — 84145 PROCALCITONIN (PCT): CPT | Performed by: PHYSICIAN ASSISTANT

## 2023-08-24 PROCEDURE — 63600175 PHARM REV CODE 636 W HCPCS: Performed by: PHYSICIAN ASSISTANT

## 2023-08-24 PROCEDURE — 94761 N-INVAS EAR/PLS OXIMETRY MLT: CPT

## 2023-08-24 PROCEDURE — 94799 UNLISTED PULMONARY SVC/PX: CPT

## 2023-08-24 PROCEDURE — G0378 HOSPITAL OBSERVATION PER HR: HCPCS

## 2023-08-24 PROCEDURE — 25000242 PHARM REV CODE 250 ALT 637 W/ HCPCS: Performed by: PHYSICIAN ASSISTANT

## 2023-08-24 PROCEDURE — 99233 SBSQ HOSP IP/OBS HIGH 50: CPT | Mod: ,,, | Performed by: PHYSICIAN ASSISTANT

## 2023-08-24 PROCEDURE — 36415 COLL VENOUS BLD VENIPUNCTURE: CPT | Performed by: PHYSICIAN ASSISTANT

## 2023-08-24 PROCEDURE — 94640 AIRWAY INHALATION TREATMENT: CPT | Mod: XB

## 2023-08-24 PROCEDURE — 85025 COMPLETE CBC W/AUTO DIFF WBC: CPT | Performed by: PHYSICIAN ASSISTANT

## 2023-08-24 RX ORDER — WARFARIN 2.5 MG/1
2.5 TABLET ORAL DAILY
Status: DISCONTINUED | OUTPATIENT
Start: 2023-08-24 | End: 2023-08-24 | Stop reason: HOSPADM

## 2023-08-24 RX ORDER — ALBUTEROL SULFATE 0.83 MG/ML
2.5 SOLUTION RESPIRATORY (INHALATION) EVERY 6 HOURS PRN
Qty: 90 ML | Refills: 0 | Status: ON HOLD | OUTPATIENT
Start: 2023-08-24 | End: 2023-10-31 | Stop reason: HOSPADM

## 2023-08-24 RX ORDER — ALBUTEROL SULFATE 90 UG/1
2 AEROSOL, METERED RESPIRATORY (INHALATION) EVERY 6 HOURS PRN
Qty: 18 G | Refills: 0 | Status: ON HOLD | OUTPATIENT
Start: 2023-08-24 | End: 2023-10-31 | Stop reason: SDUPTHER

## 2023-08-24 RX ORDER — PREDNISONE 20 MG/1
40 TABLET ORAL DAILY
Qty: 8 TABLET | Refills: 0 | Status: SHIPPED | OUTPATIENT
Start: 2023-08-25 | End: 2023-08-29

## 2023-08-24 RX ORDER — HYDROCODONE BITARTRATE AND ACETAMINOPHEN 5; 325 MG/1; MG/1
1 TABLET ORAL EVERY 8 HOURS PRN
Qty: 21 TABLET | Refills: 0 | Status: SHIPPED | OUTPATIENT
Start: 2023-08-24 | End: 2023-08-31

## 2023-08-24 RX ORDER — MUPIROCIN 20 MG/G
OINTMENT TOPICAL 2 TIMES DAILY
Status: DISCONTINUED | OUTPATIENT
Start: 2023-08-24 | End: 2023-08-24 | Stop reason: HOSPADM

## 2023-08-24 RX ADMIN — FLUTICASONE FUROATE AND VILANTEROL TRIFENATATE 1 PUFF: 200; 25 POWDER RESPIRATORY (INHALATION) at 08:08

## 2023-08-24 RX ADMIN — FLUTICASONE PROPIONATE 100 MCG: 50 SPRAY, METERED NASAL at 09:08

## 2023-08-24 RX ADMIN — PREDNISONE 40 MG: 20 TABLET ORAL at 09:08

## 2023-08-24 RX ADMIN — SACUBITRIL AND VALSARTAN 1 TABLET: 24; 26 TABLET, FILM COATED ORAL at 09:08

## 2023-08-24 RX ADMIN — ATORVASTATIN CALCIUM 40 MG: 20 TABLET, FILM COATED ORAL at 09:08

## 2023-08-24 RX ADMIN — IPRATROPIUM BROMIDE AND ALBUTEROL SULFATE 3 ML: 2.5; .5 SOLUTION RESPIRATORY (INHALATION) at 02:08

## 2023-08-24 RX ADMIN — IPRATROPIUM BROMIDE AND ALBUTEROL SULFATE 3 ML: 2.5; .5 SOLUTION RESPIRATORY (INHALATION) at 08:08

## 2023-08-24 RX ADMIN — GUAIFENESIN 600 MG: 600 TABLET, EXTENDED RELEASE ORAL at 09:08

## 2023-08-24 RX ADMIN — HYDROCODONE BITARTRATE AND ACETAMINOPHEN 1 TABLET: 5; 325 TABLET ORAL at 09:08

## 2023-08-24 RX ADMIN — FUROSEMIDE 40 MG: 40 TABLET ORAL at 09:08

## 2023-08-24 NOTE — SUBJECTIVE & OBJECTIVE
Interval History: Breathing well this morning.  Pain well controlled.  No acute events overnight.    Review of Systems   Constitutional:  Negative for chills and fever.   Respiratory:  Negative for shortness of breath.    Cardiovascular:  Negative for chest pain.   Gastrointestinal:  Positive for abdominal pain. Negative for nausea and vomiting.   Genitourinary:  Negative for dysuria and frequency.     Objective:     Vital Signs (Most Recent):  Temp: 98.3 °F (36.8 °C) (08/24/23 0705)  Pulse: 73 (08/24/23 0825)  Resp: 18 (08/24/23 0904)  BP: 125/60 (08/24/23 0705)  SpO2: (!) 93 % (08/24/23 0825) Vital Signs (24h Range):  Temp:  [97.8 °F (36.6 °C)-98.4 °F (36.9 °C)] 98.3 °F (36.8 °C)  Pulse:  [45-79] 73  Resp:  [15-30] 18  SpO2:  [80 %-100 %] 93 %  BP: (100-151)/(51-87) 125/60     Weight: (!) 137 kg (302 lb)  Body mass index is 45.92 kg/m².    Intake/Output Summary (Last 24 hours) at 8/24/2023 1021  Last data filed at 8/24/2023 0600  Gross per 24 hour   Intake --   Output 1 ml   Net -1 ml         Physical Exam  Constitutional:       General: She is not in acute distress.     Appearance: She is obese.   HENT:      Head: Atraumatic.   Eyes:      Conjunctiva/sclera: Conjunctivae normal.   Cardiovascular:      Rate and Rhythm: Normal rate and regular rhythm.      Heart sounds: Normal heart sounds. No murmur heard.  Pulmonary:      Effort: Pulmonary effort is normal.      Breath sounds: Normal breath sounds. No wheezing.   Abdominal:      General: Bowel sounds are normal. There is no distension.      Palpations: Abdomen is soft.      Tenderness: There is abdominal tenderness.      Comments: Left lower quadrant abdominal pain.   Musculoskeletal:         General: No deformity. Normal range of motion.      Cervical back: Neck supple.   Neurological:      Mental Status: She is alert and oriented to person, place, and time.             Significant Labs: All pertinent labs within the past 24 hours have been  reviewed.    Significant Imaging: I have reviewed all pertinent imaging results/findings within the past 24 hours.

## 2023-08-24 NOTE — PLAN OF CARE
Patient is alert and oriented with no communication barriers.  Patient is currently taking coumadin, but not on dialysis or oxygen.  Patient family will help her transport at discharge.   08/24/23 1427   Discharge Assessment   Assessment Type Discharge Planning Assessment   Confirmed/corrected address, phone number and insurance Yes   Confirmed Demographics Correct on Facesheet   Source of Information patient   People in Home child(nadeen), adult   Do you expect to return to your current living situation? Yes   Do you have help at home or someone to help you manage your care at home? No   Prior to hospitilization cognitive status: Alert/Oriented   Current cognitive status: Alert/Oriented   Equipment Currently Used at Home none   Readmission within 30 days? No   Patient currently being followed by outpatient case management? No   Do you currently have service(s) that help you manage your care at home? No   Do you take prescription medications? Yes   Do you have prescription coverage? Yes   Coverage Healthy Blue   Do you have any problems affording any of your prescribed medications? No   Is the patient taking medications as prescribed? yes   Who is going to help you get home at discharge? Family   How do you get to doctors appointments? car, drives self   Are you on dialysis? No   Do you take coumadin? Yes   Who monitors your labs? [facility]   DME Needed Upon Discharge  none   Transition of Care Barriers None   Discharge Plan A Home   Physical Activity   On average, how many days per week do you engage in moderate to strenuous exercise (like a brisk walk)? 0 days   On average, how many minutes do you engage in exercise at this level? 0 min   Financial Resource Strain   How hard is it for you to pay for the very basics like food, housing, medical care, and heating? Not hard   Housing Stability   In the last 12 months, was there a time when you were not able to pay the mortgage or rent on time? N   In the last 12  months, was there a time when you did not have a steady place to sleep or slept in a shelter (including now)? N   Transportation Needs   In the past 12 months, has lack of transportation kept you from medical appointments or from getting medications? no   In the past 12 months, has lack of transportation kept you from meetings, work, or from getting things needed for daily living? No   Food Insecurity   Within the past 12 months, you worried that your food would run out before you got the money to buy more. Never true   Within the past 12 months, the food you bought just didn't last and you didn't have money to get more. Never true   Stress   Do you feel stress - tense, restless, nervous, or anxious, or unable to sleep at night because your mind is troubled all the time - these days? Not at all   Social Connections   In a typical week, how many times do you talk on the phone with family, friends, or neighbors? More than 3   How often do you get together with friends or relatives? Once   How often do you attend Roman Catholic or Uatsdin services? Never   Do you belong to any clubs or organizations such as Roman Catholic groups, unions, fraternal or athletic groups, or school groups? No   How often do you attend meetings of the clubs or organizations you belong to? Never   Are you , , , , never , or living with a partner? Never marrie   Alcohol Use   Q1: How often do you have a drink containing alcohol? Never   Q2: How many drinks containing alcohol do you have on a typical day when you are drinking? None   Q3: How often do you have six or more drinks on one occasion? Never   OTHER   Name(s) of People in Home Agnes Scott     [facility]  Initial Discharge Assessment       Primary Care Provider: [facility]    Admission Diagnosis: Wheezing [R06.2]  Hypokalemia [E87.6]  COPD with acute exacerbation [J44.1]  Ovarian mass, left [N83.8]    Admission Date:  8/23/2023  Expected Discharge Date: 8/24/2023    Transition of Care Barriers: (P) None    Payor: MEDICAID / Plan: HEALTHY BLUE ([employer] [place] / Product Type: Managed Medicaid /     Extended Emergency Contact Information  Primary Emergency Contact: Torri Scott   [place]  Mobile Phone: [phone]  Relation: Father  Secondary Emergency Contact: Agnes Scott  Mobile Phone: [phone]  Relation: Daughter  Preferred language: English   needed? No    Discharge Plan A: (P) Home         [id number] - [facility] [place] - [address] AT [facility] [address]  [address]  [place] [address]  Phone: [phone] Fax: [phone]    [facility]  [address]  [place] [address]  Phone: [phone] Fax: [phone]      Initial Assessment (most recent)       Adult Discharge Assessment - 08/24/23 1427          Discharge Assessment    Assessment Type Discharge Planning Assessment (P)      Confirmed/corrected address, phone number and insurance Yes (P)      Confirmed Demographics Correct on Facesheet (P)      Source of Information patient (P)      People in Home child(nadeen), adult (P)      Do you expect to return to your current living situation? Yes (P)      Do you have help at home or someone to help you manage your care at home? No (P)      Prior to hospitilization cognitive status: Alert/Oriented (P)      Current cognitive status: Alert/Oriented (P)      Equipment Currently Used at Home none (P)      Readmission within 30 days? No (P)      Patient currently being followed by outpatient case management? No (P)      Do you currently have service(s) that help you manage your care at home? No (P)      Do you take prescription medications? Yes (P)      Do you have prescription coverage? Yes (P)      Coverage Healthy Blue (P)      Do you have any problems affording any of your prescribed medications? No (P)      Is the  patient taking medications as prescribed? yes (P)      Who is going to help you get home at discharge? Family (P)      How do you get to doctors appointments? car, drives self (P)      Are you on dialysis? No (P)      Do you take coumadin? Yes (P)      Who [facility] your labs[facility] (P)      DME Needed Upon Discharge  none (P)      Transition of Care Barriers None (P)      Discharge Plan A Home (P)         Physical Activity    On average, how many days per week do you engage in moderate to strenuous exercise (like a brisk walk)? 0 days (P)      On average, how many minutes do you engage in exercise at this level? 0 min (P)         Financial Resource Strain    How hard is it for you to pay for the very basics like food, housing, medical care, and heating? Not hard at all (P)         Housing Stability    In the last 12 months, was there a time when you were not able to pay the mortgage or rent on time? No (P)      In the last 12 months, was there a time when you did not have a steady place to sleep or slept in a shelter (including now)? No (P)         Transportation Needs    In the past 12 months, has lack of transportation kept you from medical appointments or from getting medications? No (P)      In the past 12 months, has lack of transportation kept you from meetings, work, or from getting things needed for daily living? No (P)         Food Insecurity    Within the past 12 months, you worried that your food would run out before you got the money to buy more. Never true (P)      Within the past 12 months, the food you bought just didn't last and you didn't have money to get more. Never true (P)         Stress    Do you feel stress - tense, restless, nervous, or anxious, or unable to sleep at night because your mind is troubled all the time - these days? Not at all (P)         Social Connections    In a typical week, how many times do you talk on the phone with family, friends, or neighbors? More than  three times a week (P)      How often do you get together with friends or relatives? Once a week (P)      How often do you attend Jehovah's witness or Gnosticist services? Never (P)      Do you belong to any clubs or organizations such as Jehovah's witness groups, unions, fraternal or athletic groups, or school groups? No (P)      How often do you attend meetings of the clubs or organizations you belong to? Never (P)      Are you , , , , never , or living with a partner? Never  (P)         Alcohol Use    Q1: How often do you have a drink containing alcohol? Never (P)      Q2: How many drinks containing alcohol do you have on a typical day when you are drinking? Patient does not drink (P)      Q3: How often do you have six or more drinks on one occasion? Never (P)         OTHER    Name(s) of People in Home Agnes Scott (P)

## 2023-08-24 NOTE — ASSESSMENT & PLAN NOTE
Well controlled with home regimen.  Patient reports she is no longer on metformin.  Patient has been taking Jardiance and weekly Ozempic.  Sliding scale insulin as needed for now.

## 2023-08-24 NOTE — PLAN OF CARE
Patient will f/u with her [facility] doctors.  Patient's family will transport her home at discharge.   08/24/23 1507   Final Note   Assessment Type Final Discharge Note   Anticipated Discharge Disposition Home   Post-Acute Status   Discharge Delays None known at this time     [facility]  Discharge Final Note    Primary Care Provider: [facility]    Expected Discharge Date: 8/24/2023    Final Discharge Note (most recent)       Final Note - 08/24/23 1507          Final Note    Assessment Type Final Discharge Note (P)      Anticipated Discharge Disposition Home or Self Care (P)         Post-Acute Status    Discharge Delays None known at this time (P)                      Important Message from Medicare

## 2023-08-24 NOTE — PLAN OF CARE
Pt discharged home with family. All lines DC'ed. Discharge instructions reviewed and all questions answered. Pt belongings returned. All VSS upon discharge.

## 2023-08-24 NOTE — CONSULTS
[facility] [place])  Gynecologic Oncology  Consult Note    Patient Name: Delaney Scott  MRN: [id number]  Admission Date: 2023  Hospital Length of Stay: 1 days  Attending Provider: Duy Moy MD  Primary Care Provider: [facility], [facility]  Principal Problem: COPD with acute exacerbation     Inpatient consult to Gynecologic Oncology  Consult performed by: Tammi Hdz MD  Consult ordered by: Suzette Hardy PA-C        Subjective:     Chief Complaint/Reason for Admission: COPD exacerbation    History of Present Illness:   Delaney Scott is 53 y.o.  who is admitted for COPD exacerbation. Gyn Onc is consulted for 10 cm adnexal mass noted on CT scan in ED.  Patient reports that she initially presented to the ED with abdominal pain, worse on the left than the right. The pain came out of nowhere. It moves from the midline to her back on the left side of her lower abdomen. She tried taking Tylenol for it, which made it slightly duller. She has never had this pain before.   Denies nausea/vomiting. She is tolerating PO without issue. Reports occasional diarrhea, which she has attributed to her metformin. Reports some urinary symptoms; states that she sometimes has interruptions in her urinary stream and sometimes has suprapubic pain. Denies dysuria and frequency. Denies vaginal bleeding and discharge.  Patient reports that she is unsure if she post-menopausal. She has not seen a GYN in many years. She has always had irregular menses and has not had a cycle in 7 months. However, she is unsure if she has ever gone a full year without a cycle. She does not think she has ever had an abnormal pap smear, but does not know when her last pap smear was. She has always had normal mammograms and strongly declines a colonoscopy. Reports a strong family history of cancer, although she cannot be positive which kinds of cancer. She believes that she had a maternal aunt with endometrial cancer  and maternal aunt with ovarian cancer.  Of note, patient states that she was told that she had a 10 cm mass in her pelvis a few years ago and she is relieved to know that the mass has not grown. She strongly does not desire surgery.    Oncology Treatment Plan:   [No matching plan found]    Oncology History: none    Past Medical History:   Diagnosis Date    Anticoagulant long-term use     Arthritis     Asthma     CHF (congestive heart failure), NYHA class III 2014    COPD with acute exacerbation     Diabetes mellitus     Hypertension      Past Surgical History:   Procedure Laterality Date    BILATERAL TUBAL LIGATION      laparascopic    CARDIAC SURGERY       SECTION      x2    MITRAL VALVE REPLACEMENT      [place] mechanical valve    TUBAL LIGATION       Family History       Problem Relation (Age of Onset)    Cancer Mother    Diabetes type II Mother    Hypertension Mother, Father          Tobacco Use    Smoking status: Some Days     Current packs/day: 0.50     Types: Cigarettes    Smokeless tobacco: Never   Substance and Sexual Activity    Alcohol use: No    Drug use: No    Sexual activity: Not on file       PTA Medications   Medication Sig    albuterol (PROVENTIL/VENTOLIN HFA) 90 mcg/actuation inhaler Inhale 2 puffs into the lungs every 6 (six) hours as needed.    atorvastatin (LIPITOR) 40 MG tablet Take 40 mg by mouth once daily.    carvediloL (COREG) 6.25 MG tablet Take 25 mg by mouth 2 (two) times daily with meals.    diclofenac sodium (VOLTAREN) 1 % Gel Apply 2 g topically 4 (four) times daily.    ENTRESTO 24-26 mg per tablet Take 1 tablet by mouth 2 (two) times daily.    FLOVENT  mcg/actuation inhaler Inhale 2 puffs into the lungs 2 (two) times daily.    fluticasone propionate (FLONASE) 50 mcg/actuation nasal spray 2 sprays by Each Nostril route.    furosemide (LASIX) 40 MG tablet Take 40 mg by mouth once daily.    JARDIANCE 10 mg tablet Take 10 mg by mouth.    loratadine (CLARITIN) 10  mg tablet Take 10 mg by mouth once daily.    montelukast (SINGULAIR) 10 mg tablet Take 10 mg by mouth every evening.    OZEMPIC 0.25 mg or 0.5 mg (2 mg/3 mL) pen injector Inject 0.5 mg into the skin every 7 days.    umeclidinium-vilanteroL (ANORO ELLIPTA) 62.5-25 mcg/actuation DsDv Inhale 1 puff into the lungs.    warfarin (COUMADIN) 5 MG tablet Take 1 tablet (5 mg total) by mouth Daily. (Patient taking differently: Take 5 mg by mouth. A whole tab (5MG) on sun, mon, wed, and fri     A half tab (2.5mg) on tues, thurs, and sat,)    albuterol (PROVENTIL) 2.5 mg /3 mL (0.083 %) nebulizer solution Take 3 mLs (2.5 mg total) by nebulization every 6 (six) hours as needed (For shortness of breath or wheezing).    blood sugar diagnostic Strp use once daily TO TEST BLOOD GLUCOSE.    blood-glucose meter Misc as directed    cyclobenzaprine (FLEXERIL) 10 MG tablet Take 10 mg by mouth 3 (three) times daily.    docusate sodium (COLACE) 100 MG capsule Take 1 capsule by mouth 2 (two) times daily.    HYDROcodone-acetaminophen (NORCO) 5-325 mg per tablet Take 1 tablet by mouth every 6 (six) hours as needed for Pain.    lancets 33 gauge Misc by Misc.(Non-Drug; Combo Route) route once daily.    LIDOcaine (LIDODERM) 5 % Place 1 patch onto the skin once daily. Remove & Discard patch within 12 hours or as directed by MD    omeprazole (PRILOSEC) 20 MG capsule Take 1 capsule (20 mg total) by mouth 2 (two) times a day.    ondansetron (ZOFRAN-ODT) 4 MG TbDL Take 1 tablet (4 mg total) by mouth every 8 (eight) hours as needed (nausea).       Review of patient's allergies indicates:  No Known Allergies    Review of Systems   Constitutional:  Negative for chills and fever.   Respiratory:  Negative for cough and shortness of breath.    Cardiovascular:  Negative for chest pain and palpitations.   Gastrointestinal:  Positive for abdominal pain and diarrhea. Negative for constipation, nausea and vomiting.   Genitourinary:  Negative for dysuria,  urgency, vaginal bleeding, vaginal discharge and vaginal pain.   Musculoskeletal:  Negative for arthralgias.   Integumentary:  Negative for rash.   Neurological:  Negative for syncope and headaches.      Objective:     Vital Signs (Most Recent):  Temp: 97.7 °F (36.5 °C) (08/24/23 1105)  Pulse: (!) 55 (08/24/23 1105)  Resp: 15 (08/24/23 1105)  BP: (!) 123/56 (08/24/23 1105)  SpO2: (!) 94 % (08/24/23 1105) Vital Signs (24h Range):  Temp:  [97.7 °F (36.5 °C)-98.4 °F (36.9 °C)] 97.7 °F (36.5 °C)  Pulse:  [55-79] 55  Resp:  [15-30] 15  SpO2:  [89 %-100 %] 94 %  BP: (100-151)/(51-87) 123/56     Weight: (!) 137 kg (302 lb)  Body mass index is 45.92 kg/m².    Physical Exam:   Constitutional: She is oriented to person, place, and time. She appears well-developed and well-nourished. No distress.    HENT:   Head: Normocephalic and atraumatic.    Eyes: EOM are normal.     Cardiovascular:  Normal rate.             Pulmonary/Chest: Effort normal. No respiratory distress.        Abdominal: Soft. She exhibits no mass. There is no rebound and no guarding.     Genitourinary:    Vagina normal.   Right adnexum displays no tenderness and no fullness. Left adnexum displays tenderness. Left adnexum displays no fullness.    Genitourinary Comments: BME performed, limited due to body habitus. Increased tenderness to palpation over left adnexa, although no rebound or guarding. No CMT. Unable to assess uterine contour.             Musculoskeletal: Normal range of motion.       Neurological: She is alert and oriented to person, place, and time.    Skin: Skin is warm and dry. She is not diaphoretic.    Psychiatric: She has a normal mood and affect.       Laboratory:  Recent Lab Results         08/24/23  0432        Procalcitonin <0.02  Comment: A concentration < 0.25 ng/mL represents a low risk of bacterial   infection.  Procalcitonin may not be accurate among patients with localized   infection, recent trauma or major surgery, immunosuppressed  state,   invasive fungal infection, renal dysfunction. Decisions regarding   initiation or continuation of antibiotic therapy should not be based   solely on procalcitonin levels.         Anion Gap 9       Baso # 0.01       Basophil % 0.2       BUN 12       Calcium 9.6       Chloride 99       CO2 32       Creatinine 1.0       Differential Method Automated       eGFR >60       Eos # 0.0       Eosinophil % 0.0       Glucose 123       Gran # (ANC) 5.6       Gran % 85.2       Hematocrit 38.9       Hemoglobin 11.2       Immature Grans (Abs) 0.02  Comment: Mild elevation in immature granulocytes is non specific and   can be seen in a variety of conditions including stress response,   acute inflammation, trauma and pregnancy. Correlation with other   laboratory and clinical findings is essential.         Immature Granulocytes 0.3       Lymph # 0.7       Lymph % 10.8       MCH 26.8       MCHC 28.8       MCV 93       Mono # 0.2       Mono % 3.5       MPV 10.0       nRBC 0       Platelets 262       Potassium 3.6       RBC 4.18       RDW 17.2       Sodium 140       WBC 6.59               Diagnostic Results:  Imaging Results               US Pelvis Comp with Transvag NON-OB (xpd (Final result)  Result time 08/23/23 12:59:38      Final result by Elena Cee MD (08/23/23 12:59:38)                   Impression:      Neither ovary identified.  Within the adnexa is a septated cystic mass measuring 10.1 x 8.5 x 4.3 cm.  Poorly evaluated on this exam.  Correlation with female pelvis MRI without and with contrast recommended as an outpatient.    This report was flagged in Epic as abnormal.      Electronically signed by: Elena Cee  Date:    08/23/2023  Time:    12:59               Narrative:    EXAMINATION:  US PELVIS COMP WITH TRANSVAG NON-OB (XPD)    CLINICAL HISTORY:  ovarian mass on CT;    TECHNIQUE:  Transabdominal sonography of the pelvis was performed, followed by transvaginal sonography to better evaluate the uterus  and ovaries.    COMPARISON:  CT abdomen and pelvis earlier today    FINDINGS:  Uterus:    Size: 8.8 x 4.2 x 5.3 cm    The parenchyma is homogeneous.  Endometrium is normal caliber measuring 0.5 cm.    Right ovary:    Not visualized    Left ovary:    Not visualized    Free Fluid:    None.                                        CT Abdomen Pelvis With Contrast (Final result)  Result time 08/23/23 11:26:25      Final result by Jeffrey Rose MD (08/23/23 11:26:25)                   Impression:      10.7 x 8.6 cm mass in the left adnexa with both fluid and soft tissue component.  Initial evaluation with pelvic ultrasound may be obtained for further evaluation.    Scattered colonic diverticula without evidence of acute diverticulitis.    This report was flagged in Epic as abnormal.      Electronically signed by: Jeffrey Rose MD  Date:    08/23/2023  Time:    11:26               Narrative:    EXAMINATION:  CT ABDOMEN PELVIS WITH CONTRAST    CLINICAL HISTORY:  LLQ abdominal pain;    TECHNIQUE:  Low dose axial images, sagittal and coronal reformations were obtained from the lung bases to the pubic symphysis following the IV administration of 100 mL of Omnipaque 350 .  Oral contrast was not given.    COMPARISON:  None.    FINDINGS:  There are no pleural effusions.  There is no evidence of a pneumothorax.  No airspace opacity is present.  No pulmonary nodules identified.    There are postop changes of prior mitral valve replacement.  The heart is unremarkable.  There is normal tapering of the abdominal aorta.  The aortic branch vessels are within normal limits.    There is no lymphadenopathy in the abdomen or pelvis.    The esophagus, stomach, and duodenum are within normal limits.  The small bowel is unremarkable.  The appendix is unremarkable.  There is colonic diverticula without evidence of acute diverticulitis.    The liver is unremarkable.  The gallbladder is within normal limits.  The biliary tree is within normal  limits.  The spleen is within normal limits.  The pancreas is unremarkable.  The adrenal glands are unremarkable.    The kidneys are unremarkable.  The uterus and urinary bladder are unremarkable.  The uterus is within normal limits.  There is at 10.7 x 8.6 cm mass in the left adnexa with fluid and soft tissue component.  This is incompletely characterized.    There is no evidence of free fluid in the abdomen or pelvis.  There is no evidence of free air.  There is no evidence of pneumatosis.  No portal venous air is identified.    The psoas margins are unremarkable.  There is an umbilical hernia containing omental fat.  The remaining abdominal wall is unremarkable.                                       X-Ray Chest AP Portable (Final result)  Result time 08/23/23 09:47:31      Final result by Reilly Rai MD (08/23/23 09:47:31)                   Impression:      See above      Electronically signed by: Reilly Rai MD  Date:    08/23/2023  Time:    09:47               Narrative:    EXAMINATION:  XR CHEST AP PORTABLE    CLINICAL HISTORY:  Wheezing    TECHNIQUE:  Single frontal view of the chest was performed.    COMPARISON:  12/15/2022    FINDINGS:  cardiac size is within normal limits. Wire sutures seen in the sternum and patient has had a valvular replacement. Lungs are clear no vascular congestion infiltrate or pleural effusion is noted.                                        Assessment/Plan:     Active Diagnoses:    Diagnosis Date Noted POA    PRINCIPAL PROBLEM:  COPD with acute exacerbation [J44.1] 11/03/2021 Yes    Pelvic mass [R19.00] 08/23/2023 Yes    Mobitz type 1 second degree atrioventricular block [I44.1] 08/23/2023 Yes    Essential hypertension [I10] 02/27/2020 Yes    Type 2 diabetes mellitus, without long-term current use of insulin [E11.9] 02/27/2020 Yes    Severe obesity (BMI >= 40) [E66.01] 02/27/2020 Yes    Chronic anticoagulation [Z79.01] 02/27/2020 Not Applicable    S/P MVR (mitral  valve replacement) [Z95.2] 02/27/2020 Not Applicable    Sleep apnea [G47.30] 12/19/2014 Yes    Chronic systolic heart failure [I50.22] 12/19/2014 Yes      Problems Resolved During this Admission:     1) Adnexal Mass  - Per patient, mass has been present for a few years, although unable to see imaging or notes confirming this in chart or Care Everywhere.   - Patient is poor surgical candidate and strongly does not desire surgery.   - Recommend outpatient follow up with Gyn Onc to determine next steps for management of mass, clinic messaged to coordinate.  - Recommend follow up with benign gyn for Health Maintenance, including pap smear/HPV testing. Patient has appointment with [facility] in October, however will message our resident clinic as patient expressed interest in following up at [facility]. Reviewed that patient's PCP is at [facility], but we are always happy to see her.    Thank you for your consult. I will sign off. Please contact us if you have any additional questions.    Tammi Hdz MD  Gynecologic Oncology  [facility] ([place])

## 2023-08-24 NOTE — HOSPITAL COURSE
Patient is a 53-year-old woman with a history of asthma and chronic obstructive pulmonary disease, current tobacco smoker, hypertension, diabetes mellitus type 2, chronic systolic heart failure, mitral valve replacement on warfarin who presented with left lower quadrant abdominal pain.  Patient noted to have 2nd degree type 1 AV block Steve.  Evaluated by Cardiology.  No intervention advised terms of first-degree heart block.    Patient had evidence of exacerbation of her reactive airway disease.  Bronchodilator breathing treatments and steroids with improvement.     Patient also reported chronic left lower abdominal pain which has become worse more recently. Imaging shows left adnexal mass located at the location of her pain.  Patient treated with pain medication with good effect.  Gynecology/oncology service consulted and recommended outpatient follow-up for consideration of elective surgical intervention/biopsy.    Patient responded well to treatment for chronic obstructive pulmonary disease exacerbation and moving air well now and stable to discharge home on medical therapy with close outpatient follow-up advised.  Patient advised to hold her beta-blocker therapy for now in light of for first-degree heart block.

## 2023-08-24 NOTE — PLAN OF CARE
Problem: Adult Inpatient Plan of Care  Goal: Plan of Care Review  Outcome: Ongoing, Progressing  Goal: Patient-Specific Goal (Individualized)  Outcome: Ongoing, Progressing  Goal: Absence of Hospital-Acquired Illness or Injury  Outcome: Ongoing, Progressing  Goal: Optimal Comfort and Wellbeing  Outcome: Ongoing, Progressing  Goal: Readiness for Transition of Care  Outcome: Ongoing, Progressing     Problem: Bariatric Environmental Safety  Goal: Safety Maintained with Care  Outcome: Ongoing, Progressing     Problem: Adjustment to Illness COPD (Chronic Obstructive Pulmonary Disease)  Goal: Optimal Chronic Illness Coping  Outcome: Ongoing, Progressing     Problem: Functional Ability Impaired COPD (Chronic Obstructive Pulmonary Disease)  Goal: Optimal Level of Functional Gladys  Outcome: Ongoing, Progressing     Problem: Infection COPD (Chronic Obstructive Pulmonary Disease)  Goal: Absence of Infection Signs and Symptoms  Outcome: Ongoing, Progressing     Problem: Oral Intake Inadequate COPD (Chronic Obstructive Pulmonary Disease)  Goal: Improved Nutrition Intake  Outcome: Ongoing, Progressing     Problem: Respiratory Compromise COPD (Chronic Obstructive Pulmonary Disease)  Goal: Effective Oxygenation and Ventilation  Outcome: Ongoing, Progressing     Problem: Diabetes Comorbidity  Goal: Blood Glucose Level Within Targeted Range  Outcome: Ongoing, Progressing

## 2023-08-24 NOTE — ASSESSMENT & PLAN NOTE
Presenting with lower left abdominal pain, sharp and constant. Reports being told previously that she had a cyst, but does not appear to have been seen by GYN  CT AP with 10.7 x 8.6 cm mass in the left adnexa with both fluid and soft tissue component.  Initial evaluation with pelvic ultrasound may be obtained for further evaluation.   Pelvic US with Neither ovary identified.  Within the adnexa is a septated cystic mass measuring 10.1 x 8.5 x 4.3 cm.  Poorly evaluated on this exam.  Correlation with female pelvis MRI without and with contrast recommended as an outpatient.   Will consult GYN ONC for further assistance (pending)

## 2023-08-24 NOTE — PROGRESS NOTES
"    Cardiology Progress Note    8/24/2023  1:42 PM    Subjective/Interim:      No complaints.       Objective:   24-hour Vitals:  Temp:  [97.7 °F (36.5 °C)-98.4 °F (36.9 °C)] 97.7 °F (36.5 °C)  Pulse:  [55-79] 55  Resp:  [15-30] 15  SpO2:  [89 %-100 %] 94 %  BP: (100-151)/(51-87) 123/56    Physical Examination:  Vitals: BP (!) 123/56 (BP Location: Left arm, Patient Position: Lying)   Pulse (!) 55   Temp 97.7 °F (36.5 °C) (Oral)   Resp 15   Ht 5' 8" (1.727 m)   Wt (!) 137 kg (302 lb)   LMP 01/12/2023   SpO2 (!) 94%   BMI 45.92 kg/m²     Physical Exam  Vitals reviewed.   Constitutional:       Appearance: She is obese.   Cardiovascular:      Rate and Rhythm: Normal rate and regular rhythm.      Pulses:           Carotid pulses are 2+ on the right side and 2+ on the left side.       Dorsalis pedis pulses are 2+ on the right side and 2+ on the left side.      Heart sounds: No murmur heard.     Comments: Sternal scar.  Metallic valve click present.  Musculoskeletal:      Right lower leg: No edema.      Left lower leg: No edema.   Neurological:      Mental Status: She is alert.           Intake/Output Summary (Last 24 hours) at 8/24/2023 1342  Last data filed at 8/24/2023 0600  Gross per 24 hour   Intake --   Output 1 ml   Net -1 ml       Laboratory:  Trended Lab Data:  Recent Labs   Lab 08/23/23  0954 08/24/23  0432   WBC 5.54 6.59   HGB 11.1* 11.2*   HCT 38.0 38.9    262       Recent Labs   Lab 08/23/23  0954 08/24/23  0432    140   K 2.9* 3.6   CL 97 99   CO2 32* 32*   BUN 11 12   GLU 99 123*   CALCIUM 9.4 9.6   MG 2.0  --        Recent Labs   Lab 08/23/23  0954   PROT 7.9   ALBUMIN 3.4*   BILITOT 0.5   AST 14   ALT 8*   ALKPHOS 109       Recent Labs   Lab 08/18/23  0817 08/23/23  0954   INR 2.1* 1.9*       Cardiac: No results for input(s): "TROPONINI", "CKTOTAL", "CKMB", "BNP" in the last 168 hours.    FLP: No results found for: "CHOL", "HDL", "LDLCALC", "TRIG", "CHOLHDL"  DM:   Lab Results "   Component Value Date    HGBA1C 5.7 (H) 11/03/2021    HGBA1C 5.8 (H) 02/27/2020    CREATININE 1.0 08/24/2023     Thyroid:   Lab Results   Component Value Date    TSH 0.788 12/16/2022     Anemia:   Lab Results   Component Value Date    FERRITIN 224 01/07/2022     Urinalysis:   Lab Results   Component Value Date    COLORU Colorless (A) 08/23/2023    SPECGRAV 1.010 08/23/2023    NITRITE Negative 08/23/2023    KETONESU Negative 08/23/2023    UROBILINOGEN Negative 08/23/2023     @      Other Results:  EKG (my interpretation):    TELEMETRY:  sinus with AV wenckebach    Echo: No results found for this or any previous visit.    Radiology:  US Pelvis Comp with Transvag NON-OB (xpd    Result Date: 8/23/2023  EXAMINATION: US PELVIS COMP WITH TRANSVAG NON-OB (XPD) CLINICAL HISTORY: ovarian mass on CT; TECHNIQUE: Transabdominal sonography of the pelvis was performed, followed by transvaginal sonography to better evaluate the uterus and ovaries. COMPARISON: CT abdomen and pelvis earlier today FINDINGS: Uterus: Size: 8.8 x 4.2 x 5.3 cm The parenchyma is homogeneous.  Endometrium is normal caliber measuring 0.5 cm. Right ovary: Not visualized Left ovary: Not visualized Free Fluid: None.     Neither ovary identified.  Within the adnexa is a septated cystic mass measuring 10.1 x 8.5 x 4.3 cm.  Poorly evaluated on this exam.  Correlation with female pelvis MRI without and with contrast recommended as an outpatient. This report was flagged in Epic as abnormal. Electronically signed by: Elena Cee Date:    08/23/2023 Time:    12:59    CT Abdomen Pelvis With Contrast    Result Date: 8/23/2023  EXAMINATION: CT ABDOMEN PELVIS WITH CONTRAST CLINICAL HISTORY: LLQ abdominal pain; TECHNIQUE: Low dose axial images, sagittal and coronal reformations were obtained from the lung bases to the pubic symphysis following the IV administration of 100 mL of Omnipaque 350 .  Oral contrast was not given. COMPARISON: None. FINDINGS: There are no  pleural effusions.  There is no evidence of a pneumothorax.  No airspace opacity is present.  No pulmonary nodules identified. There are postop changes of prior mitral valve replacement.  The heart is unremarkable.  There is normal tapering of the abdominal aorta.  The aortic branch vessels are within normal limits. There is no lymphadenopathy in the abdomen or pelvis. The esophagus, stomach, and duodenum are within normal limits.  The small bowel is unremarkable.  The appendix is unremarkable.  There is colonic diverticula without evidence of acute diverticulitis. The liver is unremarkable.  The gallbladder is within normal limits.  The biliary tree is within normal limits.  The spleen is within normal limits.  The pancreas is unremarkable.  The adrenal glands are unremarkable. The kidneys are unremarkable.  The uterus and urinary bladder are unremarkable.  The uterus is within normal limits.  There is at 10.7 x 8.6 cm mass in the left adnexa with fluid and soft tissue component.  This is incompletely characterized. There is no evidence of free fluid in the abdomen or pelvis.  There is no evidence of free air.  There is no evidence of pneumatosis.  No portal venous air is identified. The psoas margins are unremarkable.  There is an umbilical hernia containing omental fat.  The remaining abdominal wall is unremarkable.     10.7 x 8.6 cm mass in the left adnexa with both fluid and soft tissue component.  Initial evaluation with pelvic ultrasound may be obtained for further evaluation. Scattered colonic diverticula without evidence of acute diverticulitis. This report was flagged in Epic as abnormal. Electronically signed by: Jeffrey Rose MD Date:    08/23/2023 Time:    11:26    X-Ray Chest AP Portable    Result Date: 8/23/2023  EXAMINATION: XR CHEST AP PORTABLE CLINICAL HISTORY: Wheezing TECHNIQUE: Single frontal view of the chest was performed. COMPARISON: 12/15/2022 FINDINGS: cardiac size is within normal limits.  Wire sutures seen in the sternum and patient has had a valvular replacement. Lungs are clear no vascular congestion infiltrate or pleural effusion is noted.     See above Electronically signed by: Reilly Rai MD Date:    08/23/2023 Time:    09:47    X-Ray Toe 2 or More Views Right    Result Date: 8/18/2023  EXAMINATION: XR TOE 2 OR MORE VIEWS RIGHT CLINICAL HISTORY: pain; TECHNIQUE: XR TOE 2 OR MORE VIEWS RIGHT COMPARISON: None FINDINGS: There is an oblique fracture the distal aspect proximal phalanx of the 2nd digit which extends into the PIP joint.  Minimal displacement without definite articular incongruity.     See above Electronically signed by: Pancho Byers Jr Date:    08/18/2023 Time:    10:34        Current Medications:     Infusions:       Scheduled:   albuterol-ipratropium  3 mL Nebulization Q6H WAKE    atorvastatin  40 mg Oral Daily    fluticasone furoate-vilanteroL  1 puff Inhalation Daily    fluticasone propionate  2 spray Each Nostril Daily    furosemide  40 mg Oral Daily    guaiFENesin  600 mg Oral BID    montelukast  10 mg Oral QHS    predniSONE  40 mg Oral Daily    sacubitriL-valsartan  1 tablet Oral BID    warfarin  5 mg Oral Daily        PRN:  acetaminophen, acetaminophen, HYDROcodone-acetaminophen, HYDROcodone-acetaminophen, melatonin, ondansetron, polyethylene glycol, prochlorperazine, sodium chloride 0.9%     Assessment and Plan:     Delaney Scott is a 53 y.o.female with  H/o mechanical MVR, on warfarin  -INR goal 2.5 to 3.5.  will need anticoagulation bridging if any procedure is needed.  Will need antibiotic prophylaxis.  8/24:  INR 1.9.  will give warfarin 7.5mg tonight.     Second-degree type 1 AV block (Wenckebach), improved  -replace electrolytes and monitor on telemetry.  Holding coreg.     Adnexal mass  -as per primary team         Chandra May MD        Disclaimer: This document was created using voice recognition software (M*Modal Fluency Direct). Although it may be  edited, this document may contain errors related to incorrect recognition of the spoken word. Please call the physician if clarification is needed.

## 2023-08-24 NOTE — ASSESSMENT & PLAN NOTE
Holding beta-blocker therapy due to heart block.  Continue other antihypertensive medications.  Blood pressure well-controlled this morning.

## 2023-08-24 NOTE — CARE UPDATE
08/24/23 0825   Patient Assessment/Suction   Level of Consciousness (AVPU) alert   Expansion/Accessory Muscles/Retractions expansion symmetric;no retractions   All Lung Fields Breath Sounds Anterior:;Lateral:   MEKA Breath Sounds Posterior:;Lateral:;diminished   PRE-TX-O2   Device (Oxygen Therapy) room air   SpO2 (!) 93 %   Pulse Oximetry Type Continuous   $ Pulse Oximetry - Multiple Charge Pulse Oximetry - Multiple   Pulse 73   Resp 18   Aerosol Therapy   $ Aerosol Therapy Charges Aerosol Treatment   Daily Review of Necessity (SVN) completed   Respiratory Treatment Status (SVN) given   Treatment Route (SVN) mask;oxygen   Patient Position (SVN) Mcelroy's   Post Treatment Assessment (SVN) breath sounds unchanged   Signs of Intolerance (SVN) none   Inhaler   $ Inhaler Charges MDI (Metered Dose Inahler) Treatment;Mouth rinsed post treatment   Daily Review of Necessity (Inhaler) completed   Respiratory Treatment Status (Inhaler) given   Treatment Route (Inhaler) mouthpiece   Patient Position (Inhaler) Mcelroy's   Post Treatment Assessment (Inhaler) breath sounds unchanged   Signs of Intolerance (Inhaler) none

## 2023-08-25 ENCOUNTER — TELEPHONE (OUTPATIENT)
Dept: OBSTETRICS AND GYNECOLOGY | Facility: CLINIC | Age: 53
End: 2023-08-25
Payer: MEDICAID

## 2023-08-25 ENCOUNTER — PATIENT OUTREACH (OUTPATIENT)
Dept: ADMINISTRATIVE | Facility: CLINIC | Age: 53
End: 2023-08-25
Payer: MEDICAID

## 2023-08-25 ENCOUNTER — TELEPHONE (OUTPATIENT)
Dept: GYNECOLOGIC ONCOLOGY | Facility: CLINIC | Age: 53
End: 2023-08-25
Payer: MEDICAID

## 2023-08-25 NOTE — TELEPHONE ENCOUNTER
----- Message from Tammi Hdz MD sent at 8/24/2023  4:11 PM CDT -----  Hi,    Would you be able to schedule this patient for follow up? She needs a well woman exam (not urgent, she's also seeing gyn onc for an adnexal mass).    Thanks!  Tammi

## 2023-08-25 NOTE — TELEPHONE ENCOUNTER
----- Message from Brian Garay RN sent at 8/24/2023  4:17 PM CDT -----    ----- Message -----  From: Tammi Hdz MD  Sent: 8/24/2023   4:09 PM CDT  To: Sundeep Moura Staff    Hi,    This patient was seen inpatient at [facility] for a 10 cm adnexal mass. Would you be able to schedule her for outpatient follow up with Dr. Urbano?    Thanks!    Tammi

## 2023-08-25 NOTE — PROGRESS NOTES
C3 nurse attempted to contact Delaney Scott  for a TCC post hospital discharge follow up call. No answer. The patient does not have a scheduled HOSFU appointment, and the pt does not have an [facility] PCP.

## 2023-08-25 NOTE — DISCHARGE SUMMARY
"[facility] Intensive Care ([facility] Medicine  Discharge Summary      Patient Name: Delaney Scott  MRN: [id number]  ABRAHAM: [id number]  Patient Class: IP- Inpatient  Admission Date: 8/23/2023  Hospital Length of Stay: 1 days  Discharge Date and Time: 8/24/2023  3:37 PM  Attending Physician: Duy Moy MD  Discharging Provider: Duy Moy MD    HPI:   Chema Hardy PA-C ([facility] Medicine):    "Ms Baltazar is a 53 year old woman with a past medical history that includes COPD, asthma, heart failure, diabetes type 2, hypertension, mitral valve replacement on warfarin for left lower quadrant pain that began yesterday. She describes the pain as sharp and constant, the norco in the ER provided mild relief. Denies N/V/D, last BM was on Monday. She denies vaginal bleeding, vaginal discharge. No history of GYN malignancy that she is aware of. Surgical history includes 2 c-sections. She also notes some wheezing and shortness of breath with exertion. No chest pain, fevers, chills.     In ER: afebrile, o2 sats 80% ORA, requiring 2L NC to maintain sats; VBG with pH 7.41, CO2 55.8; CMP shows K 2.9; INR 1.9; CXR unremarkable; CT AP reveals "10.7 x 8.6 cm mass in the left adnexa with both fluid and soft tissue component." Pelvic US: "Within the adnexa is a septated cystic mass measuring 10.1 x 8.5 x 4.3 cm." She also had an EKG which sinus rhythm with second-degree type 1 AV block; cardiology consulted: replace electrolytes and monitor"      Hospital Course:   Patient is a 53-year-old woman with a history of asthma and chronic obstructive pulmonary disease, current tobacco smoker, hypertension, diabetes mellitus type 2, chronic systolic heart failure, mitral valve replacement on warfarin who presented with left lower quadrant abdominal pain.  Patient noted to have 2nd degree type 1 AV block Wenckebach.  Evaluated by Cardiology.  No intervention advised terms of first-degree heart block.    Patient had " evidence of exacerbation of her reactive airway disease.  Bronchodilator breathing treatments and steroids with improvement.     Patient also reported chronic left lower abdominal pain which has become worse more recently. Imaging shows left adnexal mass located at the location of her pain.  Patient treated with pain medication with good effect.  Gynecology/oncology service consulted and recommended outpatient follow-up for consideration of elective surgical intervention/biopsy.    Patient responded well to treatment for chronic obstructive pulmonary disease exacerbation and moving air well now and stable to discharge home on medical therapy with close outpatient follow-up advised.  Patient advised to hold her beta-blocker therapy for now in light of for first-degree heart block.       Goals of Care Treatment Preferences:  Code Status: Full Code      Consults:   Consults (From admission, onward)        Status Ordering Provider     Inpatient consult to Gynecologic Oncology  Once        Provider:  (Not yet assigned)    COLETTE Alegre          Final Active Diagnoses:    Diagnosis Date Noted POA    PRINCIPAL PROBLEM:  COPD with acute exacerbation [J44.1] 11/03/2021 Yes    Pelvic mass [R19.00] 08/23/2023 Yes    Mobitz type 1 second degree atrioventricular block [I44.1] 08/23/2023 Yes    Essential hypertension [I10] 02/27/2020 Yes    Type 2 diabetes mellitus, without long-term current use of insulin [E11.9] 02/27/2020 Yes    Severe obesity (BMI >= 40) [E66.01] 02/27/2020 Yes    Chronic anticoagulation [Z79.01] 02/27/2020 Not Applicable    S/P MVR (mitral valve replacement) [Z95.2] 02/27/2020 Not Applicable    Sleep apnea [G47.30] 12/19/2014 Yes    Chronic systolic heart failure [I50.22] 12/19/2014 Yes      Problems Resolved During this Admission:       Discharged Condition: Stable    Disposition: Home or Self Care    Patient Instructions:      Ambulatory referral/consult to Gynecologic Oncology    Standing Status: Future   Referral Priority: Urgent Referral Type: Consultation   Referral Reason: Specialty Services Required   Requested Specialty: Gynecologic Oncology   Number of Visits Requested: 1     Activity as tolerated        Medications:  Reconciled Home Medications:      Medication List      START taking these medications    predniSONE 20 MG tablet  Commonly known as: DELTASONE  Take 2 tablets (40 mg total) by mouth once daily. for 4 days        CHANGE how you take these medications    * albuterol 2.5 mg /3 mL (0.083 %) nebulizer solution  Commonly known as: PROVENTIL  Take 3 mLs (2.5 mg total) by nebulization every 6 (six) hours as needed (For shortness of breath or wheezing).  What changed: Another medication with the same name was changed. Make sure you understand how and when to take each.     * albuterol 90 mcg/actuation inhaler  Commonly known as: PROVENTIL/VENTOLIN HFA  Inhale 2 puffs into the lungs every 6 (six) hours as needed for Wheezing or Shortness of Breath.  What changed: reasons to take this     HYDROcodone-acetaminophen 5-325 mg per tablet  Commonly known as: NORCO  Take 1 tablet by mouth every 8 (eight) hours as needed for Pain.  What changed: when to take this     warfarin 5 MG tablet  Commonly known as: COUMADIN  Take 1 tablet (5 mg total) by mouth Daily.  What changed:   · when to take this  · additional instructions         * This list has 2 medication(s) that are the same as other medications prescribed for you. Read the directions carefully, and ask your doctor or other care provider to review them with you.            CONTINUE taking these medications    atorvastatin 40 MG tablet  Commonly known as: LIPITOR  Take 40 mg by mouth once daily.     ENTRESTO 24-26 mg per tablet  Generic drug: sacubitriL-valsartan  Take 1 tablet by mouth 2 (two) times daily.     * fluticasone propionate 50 mcg/actuation nasal spray  Commonly known as: FLONASE  2 sprays by Each Nostril route.     *  FLOVENT  mcg/actuation inhaler  Generic drug: fluticasone propionate  Inhale 2 puffs into the lungs 2 (two) times daily.     furosemide 40 MG tablet  Commonly known as: LASIX  Take 40 mg by mouth once daily.     JARDIANCE 10 mg tablet  Generic drug: empagliflozin  Take 10 mg by mouth.     loratadine 10 mg tablet  Commonly known as: CLARITIN  Take 10 mg by mouth once daily.     montelukast 10 mg tablet  Commonly known as: SINGULAIR  Take 10 mg by mouth every evening.     OZEMPIC 0.25 mg or 0.5 mg (2 mg/3 mL) pen injector  Generic drug: semaglutide  Inject 0.5 mg into the skin every 7 days.     TRUEPLUS LANCETS 33 gauge Misc  Generic drug: lancets  by Misc.(Non-Drug; Combo Route) route once daily.     umeclidinium-vilanteroL 62.5-25 mcg/actuation Dsdv  Commonly known as: ANORO ELLIPTA  Inhale 1 puff into the lungs.         * This list has 2 medication(s) that are the same as other medications prescribed for you. Read the directions carefully, and ask your doctor or other care provider to review them with you.            STOP taking these medications    carvediloL 6.25 MG tablet  Commonly known as: COREG     cyclobenzaprine 10 MG tablet  Commonly known as: FLEXERIL     diclofenac sodium 1 % Gel  Commonly known as: VOLTAREN     docusate sodium 100 MG capsule  Commonly known as: COLACE     LIDOcaine 5 %  Commonly known as: LIDODERM     omeprazole 20 MG capsule  Commonly known as: PRILOSEC     ondansetron 4 MG Tbdl  Commonly known as: ZOFRAN-ODT     TRUE METRIX GLUCOSE METER Misc  Generic drug: blood-glucose meter     TRUE METRIX GLUCOSE TEST STRIP Strp  Generic drug: blood sugar diagnostic            Indwelling Lines/Drains at time of discharge:   Lines/Drains/Airways     None                 Time spent on the discharge of patient: 35 minutes         Duy Moy MD  Department of Hospital Medicine  [facility] Intensive [facility] ([facility]

## 2023-08-28 NOTE — PROGRESS NOTES
C3 nurse spoke with Delaney Scott  for a TCC post hospital discharge follow up call. The patient reports does not have a scheduled HOSFU appointment. C3 nurse was unable to schedule [facility]OSFU [facility]. Patient advised to contact their PCP to schedule a HOSPFU within 5-7 days. Declined NP.

## 2023-09-20 ENCOUNTER — TELEPHONE (OUTPATIENT)
Dept: OBSTETRICS AND GYNECOLOGY | Facility: CLINIC | Age: 53
End: 2023-09-20
Payer: MEDICAID

## 2023-10-26 ENCOUNTER — HOSPITAL ENCOUNTER (INPATIENT)
Facility: OTHER | Age: 53
LOS: 5 days | Discharge: HOME | DRG: 191 | End: 2023-10-31
Attending: EMERGENCY MEDICINE | Admitting: EMERGENCY MEDICINE
Payer: MEDICAID

## 2023-10-26 DIAGNOSIS — J44.1 COPD WITH ACUTE EXACERBATION: Primary | ICD-10-CM

## 2023-10-26 PROCEDURE — 12000002 HC ACUTE/MED SURGE SEMI-PRIVATE ROOM

## 2023-10-26 PROCEDURE — 87635 SARS-COV-2 COVID-19 AMP PRB: CPT | Performed by: EMERGENCY MEDICINE

## 2023-10-26 RX ORDER — IPRATROPIUM BROMIDE AND ALBUTEROL SULFATE 2.5; .5 MG/3ML; MG/3ML
3 SOLUTION RESPIRATORY (INHALATION)
Status: COMPLETED | OUTPATIENT
Start: 2023-10-27 | End: 2023-10-27

## 2023-10-26 RX ORDER — METHYLPREDNISOLONE SOD SUCC 125 MG
125 VIAL (EA) INJECTION
Status: COMPLETED | OUTPATIENT
Start: 2023-10-27 | End: 2023-10-27

## 2023-10-27 LAB
ALBUMIN SERPL BCP-MCNC: 3.1 G/DL (ref 3.5–5.2)
ALBUMIN SERPL BCP-MCNC: 3.3 G/DL (ref 3.5–5.2)
ALP SERPL-CCNC: 102 U/L (ref 55–135)
ALP SERPL-CCNC: 108 U/L (ref 55–135)
ALT SERPL W/O P-5'-P-CCNC: 22 U/L (ref 10–44)
ALT SERPL W/O P-5'-P-CCNC: 23 U/L (ref 10–44)
ANION GAP SERPL CALC-SCNC: 10 MMOL/L (ref 8–16)
ANION GAP SERPL CALC-SCNC: 8 MMOL/L (ref 8–16)
ANISOCYTOSIS BLD QL SMEAR: SLIGHT
AST SERPL-CCNC: 15 U/L (ref 10–40)
AST SERPL-CCNC: 18 U/L (ref 10–40)
BASOPHILS # BLD AUTO: 0 K/UL (ref 0–0.2)
BASOPHILS # BLD AUTO: 0.04 K/UL (ref 0–0.2)
BASOPHILS NFR BLD: 0 % (ref 0–1.9)
BASOPHILS NFR BLD: 0.7 % (ref 0–1.9)
BILIRUB SERPL-MCNC: 0.3 MG/DL (ref 0.1–1)
BILIRUB SERPL-MCNC: 0.3 MG/DL (ref 0.1–1)
BNP SERPL-MCNC: 65 PG/ML (ref 0–99)
BUN SERPL-MCNC: 11 MG/DL (ref 6–20)
BUN SERPL-MCNC: 9 MG/DL (ref 6–20)
CALCIUM SERPL-MCNC: 9.5 MG/DL (ref 8.7–10.5)
CALCIUM SERPL-MCNC: 9.7 MG/DL (ref 8.7–10.5)
CHLORIDE SERPL-SCNC: 104 MMOL/L (ref 95–110)
CHLORIDE SERPL-SCNC: 105 MMOL/L (ref 95–110)
CO2 SERPL-SCNC: 25 MMOL/L (ref 23–29)
CO2 SERPL-SCNC: 27 MMOL/L (ref 23–29)
CREAT SERPL-MCNC: 1.1 MG/DL (ref 0.5–1.4)
CREAT SERPL-MCNC: 1.1 MG/DL (ref 0.5–1.4)
CTP QC/QA: YES
CTP QC/QA: YES
DIFFERENTIAL METHOD: ABNORMAL
DIFFERENTIAL METHOD: ABNORMAL
EOSINOPHIL # BLD AUTO: 0 K/UL (ref 0–0.5)
EOSINOPHIL # BLD AUTO: 0.4 K/UL (ref 0–0.5)
EOSINOPHIL NFR BLD: 0.2 % (ref 0–8)
EOSINOPHIL NFR BLD: 7 % (ref 0–8)
ERYTHROCYTE [DISTWIDTH] IN BLOOD BY AUTOMATED COUNT: 16.6 % (ref 11.5–14.5)
ERYTHROCYTE [DISTWIDTH] IN BLOOD BY AUTOMATED COUNT: 16.6 % (ref 11.5–14.5)
EST. GFR  (NO RACE VARIABLE): >60 ML/MIN/1.73 M^2
EST. GFR  (NO RACE VARIABLE): >60 ML/MIN/1.73 M^2
GLUCOSE SERPL-MCNC: 102 MG/DL (ref 70–110)
GLUCOSE SERPL-MCNC: 141 MG/DL (ref 70–110)
HCO3 UR-SCNC: 29.3 MMOL/L (ref 24–28)
HCT VFR BLD AUTO: 32.8 % (ref 37–48.5)
HCT VFR BLD AUTO: 35 % (ref 37–48.5)
HCT VFR BLD CALC: 36 %PCV (ref 36–54)
HGB BLD-MCNC: 10 G/DL (ref 12–16)
HGB BLD-MCNC: 10.5 G/DL (ref 12–16)
HGB BLD-MCNC: 12 G/DL
IMM GRANULOCYTES # BLD AUTO: 0.01 K/UL (ref 0–0.04)
IMM GRANULOCYTES # BLD AUTO: 0.03 K/UL (ref 0–0.04)
IMM GRANULOCYTES NFR BLD AUTO: 0.2 % (ref 0–0.5)
IMM GRANULOCYTES NFR BLD AUTO: 0.5 % (ref 0–0.5)
INR PPP: 2.5 (ref 0.8–1.2)
LYMPHOCYTES # BLD AUTO: 0.4 K/UL (ref 1–4.8)
LYMPHOCYTES # BLD AUTO: 1.5 K/UL (ref 1–4.8)
LYMPHOCYTES NFR BLD: 25.8 % (ref 18–48)
LYMPHOCYTES NFR BLD: 7.9 % (ref 18–48)
MAGNESIUM SERPL-MCNC: 1.9 MG/DL (ref 1.6–2.6)
MCH RBC QN AUTO: 27.3 PG (ref 27–31)
MCH RBC QN AUTO: 27.7 PG (ref 27–31)
MCHC RBC AUTO-ENTMCNC: 30 G/DL (ref 32–36)
MCHC RBC AUTO-ENTMCNC: 30.5 G/DL (ref 32–36)
MCV RBC AUTO: 91 FL (ref 82–98)
MCV RBC AUTO: 91 FL (ref 82–98)
MONOCYTES # BLD AUTO: 0 K/UL (ref 0.3–1)
MONOCYTES # BLD AUTO: 0.3 K/UL (ref 0.3–1)
MONOCYTES NFR BLD: 0.6 % (ref 4–15)
MONOCYTES NFR BLD: 5.4 % (ref 4–15)
NEUTROPHILS # BLD AUTO: 3.4 K/UL (ref 1.8–7.7)
NEUTROPHILS # BLD AUTO: 4.9 K/UL (ref 1.8–7.7)
NEUTROPHILS NFR BLD: 60.6 % (ref 38–73)
NEUTROPHILS NFR BLD: 91.1 % (ref 38–73)
NRBC BLD-RTO: 0 /100 WBC
NRBC BLD-RTO: 0 /100 WBC
OVALOCYTES BLD QL SMEAR: ABNORMAL
PCO2 BLDA: 49.7 MMHG (ref 35–45)
PH SMN: 7.38 [PH] (ref 7.35–7.45)
PLATELET # BLD AUTO: 268 K/UL (ref 150–450)
PLATELET # BLD AUTO: 271 K/UL (ref 150–450)
PLATELET BLD QL SMEAR: ABNORMAL
PMV BLD AUTO: 10.6 FL (ref 9.2–12.9)
PMV BLD AUTO: 9.9 FL (ref 9.2–12.9)
PO2 BLDA: 20 MMHG (ref 40–60)
POC BE: 4 MMOL/L
POC MOLECULAR INFLUENZA A AGN: NEGATIVE
POC MOLECULAR INFLUENZA B AGN: NEGATIVE
POC SATURATED O2: 30 % (ref 95–100)
POC TCO2: 31 MMOL/L (ref 24–29)
POCT GLUCOSE: 126 MG/DL (ref 70–110)
POIKILOCYTOSIS BLD QL SMEAR: SLIGHT
POLYCHROMASIA BLD QL SMEAR: ABNORMAL
POTASSIUM SERPL-SCNC: 3.6 MMOL/L (ref 3.5–5.1)
POTASSIUM SERPL-SCNC: 4.1 MMOL/L (ref 3.5–5.1)
PROT SERPL-MCNC: 7.2 G/DL (ref 6–8.4)
PROT SERPL-MCNC: 7.6 G/DL (ref 6–8.4)
PROTHROMBIN TIME: 25.7 SEC (ref 9–12.5)
RBC # BLD AUTO: 3.61 M/UL (ref 4–5.4)
RBC # BLD AUTO: 3.84 M/UL (ref 4–5.4)
SAMPLE: ABNORMAL
SARS-COV-2 RDRP RESP QL NAA+PROBE: NEGATIVE
SODIUM SERPL-SCNC: 139 MMOL/L (ref 136–145)
SODIUM SERPL-SCNC: 140 MMOL/L (ref 136–145)
TROPONIN I SERPL DL<=0.01 NG/ML-MCNC: <0.006 NG/ML (ref 0–0.03)
WBC # BLD AUTO: 5.32 K/UL (ref 3.9–12.7)
WBC # BLD AUTO: 5.69 K/UL (ref 3.9–12.7)

## 2023-10-27 PROCEDURE — 85610 PROTHROMBIN TIME: CPT | Performed by: INTERNAL MEDICINE

## 2023-10-27 PROCEDURE — G0378 HOSPITAL OBSERVATION PER HR: HCPCS

## 2023-10-27 PROCEDURE — 25000003 PHARM REV CODE 250: Performed by: INTERNAL MEDICINE

## 2023-10-27 PROCEDURE — 94644 CONT INHLJ TX 1ST HOUR: CPT

## 2023-10-27 PROCEDURE — 84484 ASSAY OF TROPONIN QUANT: CPT | Performed by: EMERGENCY MEDICINE

## 2023-10-27 PROCEDURE — 94640 AIRWAY INHALATION TREATMENT: CPT

## 2023-10-27 PROCEDURE — 94645 CONT INHLJ TX EACH ADDL HOUR: CPT

## 2023-10-27 PROCEDURE — 83735 ASSAY OF MAGNESIUM: CPT | Performed by: INTERNAL MEDICINE

## 2023-10-27 PROCEDURE — 94761 N-INVAS EAR/PLS OXIMETRY MLT: CPT

## 2023-10-27 PROCEDURE — 63600175 PHARM REV CODE 636 W HCPCS: Performed by: EMERGENCY MEDICINE

## 2023-10-27 PROCEDURE — 80053 COMPREHEN METABOLIC PANEL: CPT | Mod: 91 | Performed by: INTERNAL MEDICINE

## 2023-10-27 PROCEDURE — 25000242 PHARM REV CODE 250 ALT 637 W/ HCPCS: Performed by: INTERNAL MEDICINE

## 2023-10-27 PROCEDURE — 25000242 PHARM REV CODE 250 ALT 637 W/ HCPCS: Performed by: EMERGENCY MEDICINE

## 2023-10-27 PROCEDURE — 85025 COMPLETE CBC W/AUTO DIFF WBC: CPT | Mod: 91 | Performed by: INTERNAL MEDICINE

## 2023-10-27 PROCEDURE — 12000002 HC ACUTE/MED SURGE SEMI-PRIVATE ROOM

## 2023-10-27 PROCEDURE — 96374 THER/PROPH/DIAG INJ IV PUSH: CPT

## 2023-10-27 PROCEDURE — 99900035 HC TECH TIME PER 15 MIN (STAT)

## 2023-10-27 PROCEDURE — 82803 BLOOD GASES ANY COMBINATION: CPT

## 2023-10-27 PROCEDURE — 85025 COMPLETE CBC W/AUTO DIFF WBC: CPT | Performed by: EMERGENCY MEDICINE

## 2023-10-27 PROCEDURE — 83880 ASSAY OF NATRIURETIC PEPTIDE: CPT | Performed by: EMERGENCY MEDICINE

## 2023-10-27 PROCEDURE — 27000221 HC OXYGEN, UP TO 24 HOURS

## 2023-10-27 PROCEDURE — 94640 AIRWAY INHALATION TREATMENT: CPT | Mod: XB

## 2023-10-27 PROCEDURE — 99285 EMERGENCY DEPT VISIT HI MDM: CPT | Mod: 25

## 2023-10-27 PROCEDURE — 80053 COMPREHEN METABOLIC PANEL: CPT | Performed by: EMERGENCY MEDICINE

## 2023-10-27 RX ORDER — IBUPROFEN 200 MG
24 TABLET ORAL
Status: DISCONTINUED | OUTPATIENT
Start: 2023-10-27 | End: 2023-10-31 | Stop reason: HOSPADM

## 2023-10-27 RX ORDER — SODIUM CHLORIDE 0.9 % (FLUSH) 0.9 %
10 SYRINGE (ML) INJECTION
Status: DISCONTINUED | OUTPATIENT
Start: 2023-10-27 | End: 2023-10-31 | Stop reason: HOSPADM

## 2023-10-27 RX ORDER — FUROSEMIDE 40 MG/1
40 TABLET ORAL DAILY
Status: DISCONTINUED | OUTPATIENT
Start: 2023-10-27 | End: 2023-10-31 | Stop reason: HOSPADM

## 2023-10-27 RX ORDER — ACETAMINOPHEN 325 MG/1
650 TABLET ORAL EVERY 8 HOURS PRN
Status: DISCONTINUED | OUTPATIENT
Start: 2023-10-27 | End: 2023-10-31 | Stop reason: HOSPADM

## 2023-10-27 RX ORDER — INSULIN ASPART 100 [IU]/ML
0-5 INJECTION, SOLUTION INTRAVENOUS; SUBCUTANEOUS
Status: DISCONTINUED | OUTPATIENT
Start: 2023-10-27 | End: 2023-10-31 | Stop reason: HOSPADM

## 2023-10-27 RX ORDER — MONTELUKAST SODIUM 10 MG/1
10 TABLET ORAL NIGHTLY
Status: DISCONTINUED | OUTPATIENT
Start: 2023-10-27 | End: 2023-10-31 | Stop reason: HOSPADM

## 2023-10-27 RX ORDER — IPRATROPIUM BROMIDE AND ALBUTEROL SULFATE 2.5; .5 MG/3ML; MG/3ML
3 SOLUTION RESPIRATORY (INHALATION) EVERY 6 HOURS PRN
Status: ON HOLD | COMMUNITY
Start: 2023-10-16 | End: 2023-10-31 | Stop reason: SDUPTHER

## 2023-10-27 RX ORDER — IBUPROFEN 200 MG
16 TABLET ORAL
Status: DISCONTINUED | OUTPATIENT
Start: 2023-10-27 | End: 2023-10-31 | Stop reason: HOSPADM

## 2023-10-27 RX ORDER — SODIUM CHLORIDE 0.9 % (FLUSH) 0.9 %
3 SYRINGE (ML) INJECTION
Status: DISCONTINUED | OUTPATIENT
Start: 2023-10-27 | End: 2023-10-31 | Stop reason: HOSPADM

## 2023-10-27 RX ORDER — ONDANSETRON 2 MG/ML
4 INJECTION INTRAMUSCULAR; INTRAVENOUS EVERY 8 HOURS PRN
Status: DISCONTINUED | OUTPATIENT
Start: 2023-10-27 | End: 2023-10-31 | Stop reason: HOSPADM

## 2023-10-27 RX ORDER — IPRATROPIUM BROMIDE AND ALBUTEROL SULFATE 2.5; .5 MG/3ML; MG/3ML
3 SOLUTION RESPIRATORY (INHALATION)
Status: DISPENSED | OUTPATIENT
Start: 2023-10-27 | End: 2023-10-27

## 2023-10-27 RX ORDER — WARFARIN SODIUM 5 MG/1
5 TABLET ORAL
Status: DISCONTINUED | OUTPATIENT
Start: 2023-10-27 | End: 2023-10-31 | Stop reason: HOSPADM

## 2023-10-27 RX ORDER — FLUTICASONE PROPIONATE 50 MCG
2 SPRAY, SUSPENSION (ML) NASAL DAILY
Status: DISCONTINUED | OUTPATIENT
Start: 2023-10-27 | End: 2023-10-31 | Stop reason: HOSPADM

## 2023-10-27 RX ORDER — CETIRIZINE HYDROCHLORIDE 10 MG/1
10 TABLET ORAL DAILY
Status: DISCONTINUED | OUTPATIENT
Start: 2023-10-27 | End: 2023-10-31 | Stop reason: HOSPADM

## 2023-10-27 RX ORDER — WARFARIN 2.5 MG/1
2.5 TABLET ORAL
Status: DISCONTINUED | OUTPATIENT
Start: 2023-10-28 | End: 2023-10-31 | Stop reason: HOSPADM

## 2023-10-27 RX ORDER — GLUCAGON 1 MG
1 KIT INJECTION
Status: DISCONTINUED | OUTPATIENT
Start: 2023-10-27 | End: 2023-10-31 | Stop reason: HOSPADM

## 2023-10-27 RX ORDER — CARVEDILOL 12.5 MG/1
25 TABLET ORAL
Status: ON HOLD | COMMUNITY
Start: 2022-12-06 | End: 2023-10-31 | Stop reason: HOSPADM

## 2023-10-27 RX ORDER — TALC
6 POWDER (GRAM) TOPICAL NIGHTLY PRN
Status: DISCONTINUED | OUTPATIENT
Start: 2023-10-27 | End: 2023-10-31 | Stop reason: HOSPADM

## 2023-10-27 RX ORDER — ATORVASTATIN CALCIUM 20 MG/1
40 TABLET, FILM COATED ORAL DAILY
Status: DISCONTINUED | OUTPATIENT
Start: 2023-10-27 | End: 2023-10-31 | Stop reason: HOSPADM

## 2023-10-27 RX ORDER — PREDNISONE 20 MG/1
40 TABLET ORAL DAILY
Status: DISCONTINUED | OUTPATIENT
Start: 2023-10-27 | End: 2023-10-31 | Stop reason: HOSPADM

## 2023-10-27 RX ORDER — AMOXICILLIN 250 MG
1 CAPSULE ORAL 2 TIMES DAILY
Status: DISCONTINUED | OUTPATIENT
Start: 2023-10-27 | End: 2023-10-31 | Stop reason: HOSPADM

## 2023-10-27 RX ORDER — IPRATROPIUM BROMIDE AND ALBUTEROL SULFATE 2.5; .5 MG/3ML; MG/3ML
3 SOLUTION RESPIRATORY (INHALATION)
Status: DISCONTINUED | OUTPATIENT
Start: 2023-10-27 | End: 2023-10-28

## 2023-10-27 RX ORDER — ONDANSETRON 2 MG/ML
4 INJECTION INTRAMUSCULAR; INTRAVENOUS EVERY 12 HOURS PRN
Status: DISCONTINUED | OUTPATIENT
Start: 2023-10-27 | End: 2023-10-31 | Stop reason: HOSPADM

## 2023-10-27 RX ORDER — GABAPENTIN 100 MG/1
1 CAPSULE ORAL NIGHTLY
Status: ON HOLD | COMMUNITY
Start: 2023-10-16 | End: 2023-10-31 | Stop reason: HOSPADM

## 2023-10-27 RX ORDER — IPRATROPIUM BROMIDE AND ALBUTEROL SULFATE 2.5; .5 MG/3ML; MG/3ML
3 SOLUTION RESPIRATORY (INHALATION)
Status: COMPLETED | OUTPATIENT
Start: 2023-10-27 | End: 2023-10-27

## 2023-10-27 RX ADMIN — FLUTICASONE FUROATE 1 PUFF: 200 POWDER RESPIRATORY (INHALATION) at 08:10

## 2023-10-27 RX ADMIN — CETIRIZINE HYDROCHLORIDE 10 MG: 10 TABLET, FILM COATED ORAL at 09:10

## 2023-10-27 RX ADMIN — WARFARIN SODIUM 5 MG: 5 TABLET ORAL at 04:10

## 2023-10-27 RX ADMIN — IPRATROPIUM BROMIDE AND ALBUTEROL SULFATE 3 ML: 2.5; .5 SOLUTION RESPIRATORY (INHALATION) at 02:10

## 2023-10-27 RX ADMIN — ATORVASTATIN CALCIUM 40 MG: 20 TABLET, FILM COATED ORAL at 09:10

## 2023-10-27 RX ADMIN — IPRATROPIUM BROMIDE AND ALBUTEROL SULFATE 3 ML: 2.5; .5 SOLUTION RESPIRATORY (INHALATION) at 12:10

## 2023-10-27 RX ADMIN — SACUBITRIL AND VALSARTAN 1 TABLET: 24; 26 TABLET, FILM COATED ORAL at 09:10

## 2023-10-27 RX ADMIN — IPRATROPIUM BROMIDE AND ALBUTEROL SULFATE 3 ML: 2.5; .5 SOLUTION RESPIRATORY (INHALATION) at 01:10

## 2023-10-27 RX ADMIN — IPRATROPIUM BROMIDE AND ALBUTEROL SULFATE 3 ML: 2.5; .5 SOLUTION RESPIRATORY (INHALATION) at 06:10

## 2023-10-27 RX ADMIN — MONTELUKAST 10 MG: 10 TABLET, FILM COATED ORAL at 09:10

## 2023-10-27 RX ADMIN — SACUBITRIL AND VALSARTAN 1 TABLET: 24; 26 TABLET, FILM COATED ORAL at 10:10

## 2023-10-27 RX ADMIN — FLUTICASONE PROPIONATE 100 MCG: 50 SPRAY, METERED NASAL at 09:10

## 2023-10-27 RX ADMIN — IPRATROPIUM BROMIDE AND ALBUTEROL SULFATE 3 ML: 2.5; .5 SOLUTION RESPIRATORY (INHALATION) at 08:10

## 2023-10-27 RX ADMIN — FUROSEMIDE 40 MG: 40 TABLET ORAL at 09:10

## 2023-10-27 RX ADMIN — EMPAGLIFLOZIN 10 MG: 10 TABLET, FILM COATED ORAL at 09:10

## 2023-10-27 RX ADMIN — SENNOSIDES AND DOCUSATE SODIUM 1 TABLET: 50; 8.6 TABLET ORAL at 09:10

## 2023-10-27 RX ADMIN — METHYLPREDNISOLONE SODIUM SUCCINATE 125 MG: 125 INJECTION, POWDER, FOR SOLUTION INTRAMUSCULAR; INTRAVENOUS at 12:10

## 2023-10-27 NOTE — ASSESSMENT & PLAN NOTE
Patient presented with complaints of SOB that began the day PTA.  Patient states she continues to smoke 1 pack every 2-3 days.  She states her HFA and Nebulizer did not help, so she came to the ED for evaluation.  CXR in ED with the lung parenchyma stable in appearance.  There is no pneumothorax or pleural effusion. She was treated with IV steroids and Duonebs with mild improvement in SOB.  No f/c, n/v/d.  Has a mostly nonproductive cough. Sating well on RA currently    Plan:  Continue with Dunnebs q6  Continue with Prednisone 40mg q day x 5 days  No need for ant-biotics at this time  Continue LABA/ICS

## 2023-10-27 NOTE — ASSESSMENT & PLAN NOTE
No evidence of volume overload  TTE in 9/2023 with EF 50-55%, LVDF indeterminate, prosthetic MV  -Continue Jardiance and Entresto  -Contiune Lasix

## 2023-10-27 NOTE — H&P
"[facility]  History & Physical    Patient Name: Delaney Scott  MRN: [id number]  Patient Class: OP- Observation  Admission Date: 10/26/2023  Attending Physician: Prem Bernard MD   Primary Care Provider: [facility]         Patient information was obtained from patient, past medical records and ER records.     Subjective:     Principal Problem:COPD exacerbation    Chief Complaint:   Chief Complaint   Patient presents with    Shortness of Breath     "I can't breathe" since 1300 today. HX asthma, attempted inhaler at home with no relief. Labored breathing in triage. "I feel like there is a lump in my throat". Reveals she was recently admitted after having hysterectomy on 10/12.        HPI: Patient is a 53 year old female with a PMH of COPD/Asthma, HTN, T2D, CHF, MVR on AC, S/p RA-Hystrectomy/BSO/Lysis of Adhesions and Cystoscopy in 10/2023 who presented with complaints of SOB that began the day PTA.  Patient states she continues to smoke 1 pack every 2-3 days.  She states her HFA and Nebulizer did not help, so she came to the ED for evaluation.  CXR in ED with the lung parenchyma stable in appearance.  There is no pneumothorax or pleural effusion. She was treated with IV steroids and Duonebs with mild improvement in SOB.  No f/c, n/v/d.  Has a mostly nonproductive cough.  No chest pain.        Past Medical History:   Diagnosis Date    Anticoagulant long-term use     Arthritis     Asthma     CHF (congestive heart failure), NYHA class III 2014    COPD with acute exacerbation     Diabetes mellitus     Hypertension        Past Surgical History:   Procedure Laterality Date    BILATERAL TUBAL LIGATION      laparascopic    CARDIAC SURGERY       SECTION      x2    MITRAL VALVE REPLACEMENT      [employer] mechanical valve    TUBAL LIGATION         Review of patient's allergies indicates:  No Known Allergies    No current facility-administered " medications on file prior to encounter.     Current Outpatient Medications on File Prior to Encounter   Medication Sig    albuterol (PROVENTIL) 2.5 mg /3 mL (0.083 %) nebulizer solution Take 3 mLs (2.5 mg total) by nebulization every 6 (six) hours as needed (For shortness of breath or wheezing).    albuterol (PROVENTIL/VENTOLIN HFA) 90 mcg/actuation inhaler Inhale 2 puffs into the lungs every 6 (six) hours as needed for Wheezing or Shortness of Breath.    atorvastatin (LIPITOR) 40 MG tablet Take 40 mg by mouth once daily.    ENTRESTO 24-26 mg per tablet Take 1 tablet by mouth 2 (two) times daily.    FLOVENT  mcg/actuation inhaler Inhale 2 puffs into the lungs 2 (two) times daily.    fluticasone propionate (FLONASE) 50 mcg/actuation nasal spray 2 sprays by Each Nostril route.    furosemide (LASIX) 40 MG tablet Take 40 mg by mouth once daily.    JARDIANCE 10 mg tablet Take 10 mg by mouth.    lancets 33 gauge Misc by Misc.(Non-Drug; Combo Route) route once daily. (Patient not taking: Reported on 8/28/2023)    loratadine (CLARITIN) 10 mg tablet Take 10 mg by mouth once daily.    montelukast (SINGULAIR) 10 mg tablet Take 10 mg by mouth every evening.    OZEMPIC 0.25 mg or 0.5 mg (2 mg/3 mL) pen injector Inject 0.5 mg into the skin every 7 days.    umeclidinium-vilanteroL (ANORO ELLIPTA) 62.5-25 mcg/actuation DsDv Inhale 1 puff into the lungs.    warfarin (COUMADIN) 5 MG tablet Take 1 tablet (5 mg total) by mouth Daily. (Patient taking differently: Take 5 mg by mouth. A whole tab (5MG) on sun, mon, wed, and fri     A half tab (2.5mg) on tues, thurs, and sat,)     Family History       Problem Relation (Age of Onset)    Cancer Mother    Diabetes type II Mother    Hypertension Mother, Father          Tobacco Use    Smoking status: Some Days     Current packs/day: 0.50     Types: Cigarettes    Smokeless tobacco: Never   Substance and Sexual Activity    Alcohol use: No    Drug use: No    Sexual  activity: Not on file     Review of Systems   Constitutional:  Positive for activity change. Negative for chills and fever.   HENT:  Negative for ear pain.    Eyes:  Negative for pain.   Respiratory:  Positive for cough, shortness of breath and wheezing. Negative for stridor.    Cardiovascular:  Negative for chest pain, palpitations and leg swelling.   Gastrointestinal:  Negative for abdominal distention, abdominal pain, diarrhea, nausea and vomiting.   Genitourinary:  Negative for difficulty urinating and dysuria.   Musculoskeletal:  Negative for neck pain.   Skin:  Negative for wound.   Neurological:  Negative for dizziness and headaches.   Psychiatric/Behavioral:  Negative for agitation, behavioral problems and confusion.      Objective:     Vital Signs (Most Recent):  Temp: 97.9 °F (36.6 °C) (10/27/23 0645)  Pulse: 84 (10/27/23 1248)  Resp: (!) 21 (10/27/23 1248)  BP: (!) 109/56 (10/27/23 1248)  SpO2: (!) 94 % (10/27/23 1248) Vital Signs (24h Range):  Temp:  [97.9 °F (36.6 °C)-98.3 °F (36.8 °C)] 97.9 °F (36.6 °C)  Pulse:  [68-88] 84  Resp:  [16-22] 21  SpO2:  [90 %-99 %] 94 %  BP: (101-135)/(51-66) 109/56        There is no height or weight on file to calculate BMI.     Physical Exam  Constitutional:       General: She is not in acute distress.     Appearance: Normal appearance. She is obese. She is not ill-appearing or toxic-appearing.   HENT:      Head: Normocephalic and atraumatic.      Right Ear: External ear normal.      Left Ear: External ear normal.      Nose: Nose normal.      Mouth/Throat:      Mouth: Mucous membranes are moist.      Pharynx: Oropharynx is clear.   Eyes:      General: No scleral icterus.     Pupils: Pupils are equal, round, and reactive to light.   Cardiovascular:      Rate and Rhythm: Normal rate and regular rhythm.      Pulses: Normal pulses.      Heart sounds: Normal heart sounds.   Pulmonary:      Effort: Pulmonary effort is normal.      Breath sounds: No stridor. Wheezing present.  No rales.   Abdominal:      General: Abdomen is flat. Bowel sounds are normal. There is no distension.      Tenderness: There is no abdominal tenderness.   Musculoskeletal:         General: Normal range of motion.      Cervical back: Normal range of motion and neck supple. No rigidity.      Right lower leg: No edema.      Left lower leg: No edema.   Skin:     General: Skin is warm and dry.      Findings: No rash.   Neurological:      General: No focal deficit present.      Mental Status: She is alert. Mental status is at baseline.      Cranial Nerves: No cranial nerve deficit.      Motor: No weakness.   Psychiatric:         Mood and Affect: Mood normal.         Behavior: Behavior normal.              CRANIAL NERVES     CN III, IV, VI   Pupils are equal, round, and reactive to light.       Significant Labs: All pertinent labs within the past 24 hours have been reviewed.    Significant Imaging: I have reviewed all pertinent imaging results/findings within the past 24 hours.    Assessment/Plan:     * COPD exacerbation  Patient presented with complaints of SOB that began the day PTA.  Patient states she continues to smoke 1 pack every 2-3 days.  She states her HFA and Nebulizer did not help, so she came to the ED for evaluation.  CXR in ED with the lung parenchyma stable in appearance.  There is no pneumothorax or pleural effusion. She was treated with IV steroids and Duonebs with mild improvement in SOB.  No f/c, n/v/d.  Has a mostly nonproductive cough. Sating well on RA currently    Plan:  Continue with Dunnebs q6  Continue with Prednisone 40mg q day x 5 days  No need for ant-biotics at this time  Continue LABA/ICS        Type 2 diabetes mellitus, without long-term current use of insulin  A1C in 9/2023 was 5.6  Home Meds:  Ozempic, Jardiance.  -Hold Ozempic and continue Jardiance  -Diabetic Diet  -Low correction dose SSI      Chronic systolic heart failure  No evidence of volume overload  TTE in 9/2023 with EF  50-55%, LVDF indeterminate, prosthetic MV  -Continue Jardiance and Entresto  -Contiune Lasix        Hyperlipidemia  -Continue statin      Sleep apnea  -Continue CPAP      Severe obesity (BMI >= 40)  There is no height or weight on file to calculate BMI. Morbid obesity complicates all aspects of disease management from diagnostic modalities to treatment. Weight loss encouraged and health benefits explained to patient.         S/P MVR (mitral valve replacement)  -Continue Warfarin  -Follow daily INR      Essential hypertension  SBP stable  -See above        VTE Risk Mitigation (From admission, onward)         Ordered     warfarin (COUMADIN) tablet 2.5 mg  Every Tues, Thurs, Sat, Sun         10/27/23 0728     warfarin (COUMADIN) tablet 5 mg  Every Mon, Wed, Fri         10/27/23 0728     IP VTE HIGH RISK PATIENT  Once         10/27/23 0728     Place sequential compression device  Until discontinued         10/27/23 0728                   On 10/27/2023, patient should be placed in hospital observation services under my care.            Prem Bernard MD  Department of Hospital Medicine  [facility] - Emergency Dept

## 2023-10-27 NOTE — ASSESSMENT & PLAN NOTE
A1C in 9/2023 was 5.6  Home Meds:  Ozempic, Jardiance.  -Hold Ozempic and continue Jardiance  -Diabetic Diet  -Low correction dose SSI

## 2023-10-27 NOTE — HPI
"Per Dr. Amaro. LAQUITA Bernard:    "Patient is a 53 year old female with a PMH of COPD/Asthma, HTN, T2D, CHF, MVR on AC, S/p RA-Hystrectomy/BSO/Lysis of Adhesions and Cystoscopy in 10/2023 who presented with complaints of SOB that began the day PTA.  Patient states she continues to smoke 1 pack every 2-3 days.  She states her HFA and Nebulizer did not help, so she came to the ED for evaluation.  CXR in ED with the lung parenchyma stable in appearance.  There is no pneumothorax or pleural effusion. She was treated with IV steroids and Duonebs with mild improvement in SOB.  No f/c, n/v/d.  Has a mostly nonproductive cough.  No chest pain."  "

## 2023-10-27 NOTE — SUBJECTIVE & OBJECTIVE
Past Medical History:   Diagnosis Date    Anticoagulant long-term use     Arthritis     Asthma     CHF (congestive heart failure), NYHA class III 2014    COPD with acute exacerbation     Diabetes mellitus     Hypertension        Past Surgical History:   Procedure Laterality Date    BILATERAL TUBAL LIGATION      laparascopic    CARDIAC SURGERY       SECTION      x2    MITRAL VALVE REPLACEMENT      [employer] mechanical valve    TUBAL LIGATION         Review of patient's allergies indicates:  No Known Allergies    No current facility-administered medications on file prior to encounter.     Current Outpatient Medications on File Prior to Encounter   Medication Sig    albuterol (PROVENTIL) 2.5 mg /3 mL (0.083 %) nebulizer solution Take 3 mLs (2.5 mg total) by nebulization every 6 (six) hours as needed (For shortness of breath or wheezing).    albuterol (PROVENTIL/VENTOLIN HFA) 90 mcg/actuation inhaler Inhale 2 puffs into the lungs every 6 (six) hours as needed for Wheezing or Shortness of Breath.    atorvastatin (LIPITOR) 40 MG tablet Take 40 mg by mouth once daily.    ENTRESTO 24-26 mg per tablet Take 1 tablet by mouth 2 (two) times daily.    FLOVENT  mcg/actuation inhaler Inhale 2 puffs into the lungs 2 (two) times daily.    fluticasone propionate (FLONASE) 50 mcg/actuation nasal spray 2 sprays by Each Nostril route.    furosemide (LASIX) 40 MG tablet Take 40 mg by mouth once daily.    JARDIANCE 10 mg tablet Take 10 mg by mouth.    lancets 33 gauge Misc by Misc.(Non-Drug; Combo Route) route once daily. (Patient not taking: Reported on 2023)    loratadine (CLARITIN) 10 mg tablet Take 10 mg by mouth once daily.    montelukast (SINGULAIR) 10 mg tablet Take 10 mg by mouth every evening.    OZEMPIC 0.25 mg or 0.5 mg (2 mg/3 mL) pen injector Inject 0.5 mg into the skin every 7 days.    umeclidinium-vilanteroL (ANORO ELLIPTA) 62.5-25 mcg/actuation DsDv Inhale 1 puff into the lungs.    warfarin  (COUMADIN) 5 MG tablet Take 1 tablet (5 mg total) by mouth Daily. (Patient taking differently: Take 5 mg by mouth. A whole tab (5MG) on sun, mon, wed, and fri     A half tab (2.5mg) on tues, thurs, and sat,)     Family History       Problem Relation (Age of Onset)    Cancer Mother    Diabetes type II Mother    Hypertension Mother, Father          Tobacco Use    Smoking status: Some Days     Current packs/day: 0.50     Types: Cigarettes    Smokeless tobacco: Never   Substance and Sexual Activity    Alcohol use: No    Drug use: No    Sexual activity: Not on file     Review of Systems   Constitutional:  Positive for activity change. Negative for chills and fever.   HENT:  Negative for ear pain.    Eyes:  Negative for pain.   Respiratory:  Positive for cough, shortness of breath and wheezing. Negative for stridor.    Cardiovascular:  Negative for chest pain, palpitations and leg swelling.   Gastrointestinal:  Negative for abdominal distention, abdominal pain, diarrhea, nausea and vomiting.   Genitourinary:  Negative for difficulty urinating and dysuria.   Musculoskeletal:  Negative for neck pain.   Skin:  Negative for wound.   Neurological:  Negative for dizziness and headaches.   Psychiatric/Behavioral:  Negative for agitation, behavioral problems and confusion.      Objective:     Vital Signs (Most Recent):  Temp: 97.9 °F (36.6 °C) (10/27/23 0645)  Pulse: 84 (10/27/23 1248)  Resp: (!) 21 (10/27/23 1248)  BP: (!) 109/56 (10/27/23 1248)  SpO2: (!) 94 % (10/27/23 1248) Vital Signs (24h Range):  Temp:  [97.9 °F (36.6 °C)-98.3 °F (36.8 °C)] 97.9 °F (36.6 °C)  Pulse:  [68-88] 84  Resp:  [16-22] 21  SpO2:  [90 %-99 %] 94 %  BP: (101-135)/(51-66) 109/56        There is no height or weight on file to calculate BMI.     Physical Exam  Constitutional:       General: She is not in acute distress.     Appearance: Normal appearance. She is obese. She is not ill-appearing or toxic-appearing.   HENT:      Head: Normocephalic and  atraumatic.      Right Ear: External ear normal.      Left Ear: External ear normal.      Nose: Nose normal.      Mouth/Throat:      Mouth: Mucous membranes are moist.      Pharynx: Oropharynx is clear.   Eyes:      General: No scleral icterus.     Pupils: Pupils are equal, round, and reactive to light.   Cardiovascular:      Rate and Rhythm: Normal rate and regular rhythm.      Pulses: Normal pulses.      Heart sounds: Normal heart sounds.   Pulmonary:      Effort: Pulmonary effort is normal.      Breath sounds: No stridor. Wheezing present. No rales.   Abdominal:      General: Abdomen is flat. Bowel sounds are normal. There is no distension.      Tenderness: There is no abdominal tenderness.   Musculoskeletal:         General: Normal range of motion.      Cervical back: Normal range of motion and neck supple. No rigidity.      Right lower leg: No edema.      Left lower leg: No edema.   Skin:     General: Skin is warm and dry.      Findings: No rash.   Neurological:      General: No focal deficit present.      Mental Status: She is alert. Mental status is at baseline.      Cranial Nerves: No cranial nerve deficit.      Motor: No weakness.   Psychiatric:         Mood and Affect: Mood normal.         Behavior: Behavior normal.              CRANIAL NERVES     CN III, IV, VI   Pupils are equal, round, and reactive to light.       Significant Labs: All pertinent labs within the past 24 hours have been reviewed.    Significant Imaging: I have reviewed all pertinent imaging results/findings within the past 24 hours.

## 2023-10-27 NOTE — PLAN OF CARE
Lives with adult children - independent in ADLs - daughter will provide transportation home     [facility] Emergency Dept  Initial Discharge Assessment       Primary Care Provider: [facility]    Admission Diagnosis: COPD with acute exacerbation [J44.1]    Admission Date: 10/26/2023  Expected Discharge Date:     Transition of Care Barriers: None    Payor: MEDICAID / Plan: [place] / Product Type: Managed Medicaid /     Extended Emergency Contact Information  Primary Emergency Contact: Torir Scott   [place]  Mobile Phone: [phone]  Relation: Father  Secondary Emergency Contact: SoniaAgnes  Mobile Phone: [phone]  Relation: Daughter  Preferred language: English   needed? No       Discharge Plan B: Home with family      [id number] - [place], [place] - [address] AT [facility] [address]  [address]  [place] [address]  Phone: [phone] Fax: [phone]    [facility]  [address]  [place] [address]  Phone: [phone] Fax: [phone]      Initial Assessment (most recent)       Adult Discharge Assessment - 10/27/23 0810          Discharge Assessment    Assessment Type Discharge Planning Assessment     Confirmed/corrected address, phone number and insurance Yes     Confirmed Demographics Correct on Facesheet     Source of Information patient     Does patient/caregiver understand observation status Yes     People in Home child(nadeen), adult     Do you expect to return to your current living situation? Yes     Do you have help at home or someone to help you manage your care at home? Yes     Prior to hospitilization cognitive status: Alert/Oriented     Current cognitive status: Alert/Oriented     Walking or Climbing Stairs ambulation difficulty, requires equipment     Equipment Currently Used at Home cane, straight;nebulizer     Patient currently being followed by  outpatient case management? No     Do you currently have service(s) that help you manage your care at home? No     Do you take prescription medications? Yes     Do you have prescription coverage? Yes     Do you have any problems affording any of your prescribed medications? No     Is the patient taking medications as prescribed? yes     Who is going to help you get home at discharge? daughter     How do you get to doctors appointments? family or friend will provide     Are you on dialysis? No     DME Needed Upon Discharge  none     Discharge Plan discussed with: Patient     Transition of Care Barriers None     Discharge Plan B Home with family

## 2023-10-27 NOTE — ED NOTES
Pt awake and alert; resting quietly on stretcher.  Pt remains on continuous cardiac and pulse ox monitoring with 3 liters of supplemental oxygen via nasal cannula.  Non-invasive blood pressure continues to cycle every 30 minutes.  VS stable; NSR noted. Pt denies pain at this time but reporting increased SOB. Pt verbally requesting a breathing treatment. RT informed.  No acute distress or discomfort reported or observed.  Pt denies restroom needs at this time; is able to reposition self on stretcher. Bed locked in lowest position; side rails up and locked x 2; call light, bedside table, and personal belongings within reach. Room assessed for safety measures and cleanliness; no action needed at this time. Plan of care discussed; awaiting hospital bed assignment.  Pt instructed to alert nurse for assistance and before attempting to get out of bed; verbalizes understanding; will continue to monitor.

## 2023-10-27 NOTE — ED NOTES
Pt sleeping quietly on stretcher; left undisturbed at this time.  Pt remains on continuous cardiac and pulse ox monitoring with 2 liters of supplemental oxygen via nasal cannula.  Non-invasive blood pressure continues to cycle every 30 minutes.  VS stable; NSR noted. Pt appears comfortable; respirations are spontaneous, even, and non-labored; no signs of acute distress or discomfort observed.  Pt is able to reposition self on stretcher. Bed locked in lowest position; side rails up and locked x 2; call light, bedside table, and personal belongings within reach. Room assessed for safety measures and cleanliness; no action needed at this time; will continue to monitor.

## 2023-10-27 NOTE — CARE UPDATE
10/27/23 1410   Patient Assessment/Suction   Level of Consciousness (AVPU) alert   Respiratory Effort Normal;Unlabored   All Lung Fields Breath Sounds wheezes, expiratory   Skin Integrity   $ Wound Care Tech Time 15 min   Area Observed Left;Right;Behind ear;Nares   Skin Appearance without discoloration   PRE-TX-O2   Device (Oxygen Therapy) nasal cannula with humidification   $ Is the patient on Low Flow Oxygen? Yes   Flow (L/min) 2   SpO2 (!) 91 %   Pulse Oximetry Type Intermittent   $ Pulse Oximetry - Multiple Charge Pulse Oximetry - Multiple   Pulse 84   Resp 16   Aerosol Therapy   $ Aerosol Therapy Charges Aerosol Treatment   Respiratory Treatment Status (SVN) given   Treatment Route (SVN) mask;air   Patient Position (SVN) Mcelroy's   Post Treatment Assessment (SVN) breath sounds unchanged   Signs of Intolerance (SVN) none

## 2023-10-27 NOTE — ED PROVIDER NOTES
"Encounter Date: 10/26/2023       History     Chief Complaint   Patient presents with    Shortness of Breath     "I can't breathe" since 1300 today. HX asthma, attempted inhaler at home with no relief. Labored breathing in triage. "I feel like there is a lump in my throat". Reveals she was recently admitted after having hysterectomy on 10/12.     53-year-old female with history of hypertension, diabetes, CHF with mitral valve replacement on long-term anticoagulation, presents with complaint of shortness of breath.  She states current symptoms are typical of her asthma/COPD exacerbations.  Symptoms began this morning.  She had preceding runny nose and mild cough.  She has been using her inhaler at home, but is out of her nebulizer solution, and she did not have relief.  She denies any fevers or chills, denies any leg swelling, she does report chest pain described as "tightness" across the front of her chest.  She reports that within the last 2 weeks she had a hysterectomy, was always on Lovenox or her anticoagulation.  She states that she is recovering well from surgery, no abdominal pain, vaginal bleeding, and having normal bowel movements.       Review of patient's allergies indicates:  No Known Allergies  Past Medical History:   Diagnosis Date    Anticoagulant long-term use     Arthritis     Asthma     CHF (congestive heart failure), NYHA class III 2014    COPD with acute exacerbation     Diabetes mellitus     Hypertension      Past Surgical History:   Procedure Laterality Date    BILATERAL TUBAL LIGATION      laparascopic    CARDIAC SURGERY       SECTION      x2    MITRAL VALVE REPLACEMENT      [place] mechanical valve    TUBAL LIGATION       Family History   Problem Relation Age of Onset    Hypertension Mother     Cancer Mother         unknown type    Diabetes type II Mother     Hypertension Father      Social History     Tobacco Use    Smoking status: Some Days     Current packs/day: 0.50     " Types: Cigarettes    Smokeless tobacco: Never   Substance Use Topics    Alcohol use: No    Drug use: No     Review of Systems   Constitutional:  Negative for chills and fever.   HENT:  Positive for rhinorrhea. Negative for congestion and sore throat.    Eyes:  Negative for visual disturbance.   Respiratory:  Positive for cough, chest tightness, shortness of breath and wheezing.    Cardiovascular:  Positive for chest pain. Negative for palpitations.   Gastrointestinal:  Negative for abdominal pain, diarrhea and vomiting.   Genitourinary:  Negative for decreased urine volume, dysuria and vaginal discharge.   Musculoskeletal:  Negative for joint swelling, neck pain and neck stiffness.   Skin:  Negative for rash and wound.   Neurological:  Negative for weakness, numbness and headaches.   Psychiatric/Behavioral:  Negative for confusion.        Physical Exam     Initial Vitals [10/26/23 2333]   BP Pulse Resp Temp SpO2   (!) 101/57 75 (!) 22 98.3 °F (36.8 °C) (!) 94 %      MAP       --         Physical Exam    Nursing note and vitals reviewed.  Constitutional: She appears well-developed and well-nourished. No distress.   Obese.   HENT:   Head: Normocephalic and atraumatic.   Mouth/Throat: Oropharynx is clear and moist. No oropharyngeal exudate.   Eyes: Conjunctivae and EOM are normal. Pupils are equal, round, and reactive to light.   Neck: Neck supple.   Cardiovascular:  Normal rate and normal heart sounds.           No murmur heard.  Pulmonary/Chest: She is in respiratory distress. She has wheezes (Inspiratory and expiratory). She has no rhonchi. She has no rales.   Abdominal: Abdomen is soft. There is no abdominal tenderness.   Multiple abdominal wound incisions are healing well, clean dry and intact, several with Dermabond in place. There is no rebound and no guarding.   Musculoskeletal:         General: No tenderness or edema.      Cervical back: Neck supple.     Neurological: She is alert and oriented to person,  place, and time. She has normal strength. GCS score is 15. GCS eye subscore is 4. GCS verbal subscore is 5. GCS motor subscore is 6.   Skin: Skin is warm and dry. No rash noted.   Psychiatric: She has a normal mood and affect. Thought content normal.         ED Course   Critical Care    Date/Time: 10/27/2023 6:26 AM    Performed by: Marisabel Vu MD  Authorized by: Marisabel Vu MD  Direct patient critical care time: 16 minutes  Additional history critical care time: 5 minutes  Ordering / reviewing critical care time: 6 minutes  Documentation critical care time: 6 minutes  Consulting other physicians critical care time: 6 minutes  Total critical care time (exclusive of procedural time) : 39 minutes  Critical care time was exclusive of separately billable procedures and treating other patients.  Critical care was necessary to treat or prevent imminent or life-threatening deterioration of the following conditions: respiratory failure.  Critical care was time spent personally by me on the following activities: blood draw for specimens, development of treatment plan with patient or surrogate, discussions with consultants, evaluation of patient's response to treatment, examination of patient, interpretation of cardiac output measurements, obtaining history from patient or surrogate, ordering and performing treatments and interventions, ordering and review of laboratory studies, ordering and review of radiographic studies, pulse oximetry, re-evaluation of patient's condition and review of old charts.        Labs Reviewed   CBC W/ AUTO DIFFERENTIAL - Abnormal; Notable for the following components:       Result Value    RBC 3.61 (*)     Hemoglobin 10.0 (*)     Hematocrit 32.8 (*)     MCHC 30.5 (*)     RDW 16.6 (*)     All other components within normal limits   COMPREHENSIVE METABOLIC PANEL - Abnormal; Notable for the following components:    Albumin 3.1 (*)     All other components within normal limits   B-TYPE  NATRIURETIC PEPTIDE   TROPONIN I   SARS-COV-2 RDRP GENE   POCT INFLUENZA A/B MOLECULAR          Imaging Results              X-Ray Chest AP Portable (Final result)  Result time 10/27/23 00:56:19      Final result by Alberta Acevedo MD (10/27/23 00:56:19)                   Impression:      No significant change.      Electronically signed by: Alberta Acevedo  Date:    10/27/2023  Time:    00:56               Narrative:    EXAMINATION:  AP PORTABLE CHEST    CLINICAL HISTORY:  Asthma;    TECHNIQUE:  AP portable chest radiograph was submitted.    COMPARISON:  11/02/2021 and 08/23/2023    FINDINGS:  Median sternotomy changes are present.  There is mitral valve replacement.  AP portable chest radiograph demonstrates a cardiac silhouette within normal limits.  There is mild bulging at the AP window, which may or may not suggest pulmonary hypertension.  Similar appearance is seen on the study of 11/02/2021.  The lung parenchyma is stable in appearance.  There is no pneumothorax or pleural effusion.  Two of the upper sternal wires are again broken.                                       Medications   albuterol-ipratropium 2.5 mg-0.5 mg/3 mL nebulizer solution 3 mL (3 mLs Nebulization Given 10/27/23 0144)   albuterol-ipratropium 2.5 mg-0.5 mg/3 mL nebulizer solution 3 mL (3 mLs Nebulization Given 10/27/23 0024)   methylPREDNISolone sodium succinate injection 125 mg (125 mg Intravenous Given 10/27/23 0018)   albuterol-ipratropium 2.5 mg-0.5 mg/3 mL nebulizer solution 3 mL (3 mLs Nebulization Given 10/27/23 0619)     Medical Decision Making  Emergent evaluation a 53-year-old female with history obstructive airways disease presents with complaint of wheezing and chest tightness, out of albuterol nebulizer solution.  Vital signs reveal room-air hypoxia, afebrile.  On exam she is inspiratory and expiratory wheezes, respiratory distress.  Despite multiple rounds of DuoNebs and steroids, she continues to require 2 L oxygen by  nasal cannula and continued expiratory wheezes.  She will be admitted to Hospital Medicine for further care.  Rapid COVID and flu testing are performed and negative.  Considered possibility of cardiac ischemia or heart failure as cause of symptoms and differential diagnosis, no evidence of this and workup.  Ultimately she is admitted in serious condition for further care and monitoring.    Amount and/or Complexity of Data Reviewed  Labs: ordered. Decision-making details documented in ED Course.  Radiology: ordered. Decision-making details documented in ED Course.  ECG/medicine tests:  Decision-making details documented in ED Course.    Risk  Prescription drug management.               ED Course as of 10/27/23 0626   Fri Oct 27, 2023   0016 EKG 12-lead  Junctional rhythm at rate of 74, no STEMI.  Similar appearance to previous tracing on file. [AK]   0101 CBC auto differential(!)  Reviewed.  No leukocytosis.  H&H is similar to her baseline.  No need for emergent transfusion. [AK]   0101 B-Type natriuretic peptide (BNP)  Reviewed and within normal limits, doubt CHF. [AK]   0101 Troponin I  Reviewed and within normal limits, doubt acute MI.  [AK]   0101 POCT COVID-19 Rapid Screening  Reviewed and negative. [AK]   0101 Comprehensive metabolic panel(!)  Reviewed and benign.  No profound hyperglycemia, no electrolyte disturbance, no azotemia, no evidence of hepatitis. [AK]   0102 X-Ray Chest AP Portable  Reviewed.  Sternotomy wires in place.  No lobar infiltrate or large pleural effusion or pneumothorax.  I will defer to official radiology reading. [AK]   0108 Significant continued wheezing on repeat exam.  Will order additional nebs. [AK]   0610 On reassessment, patient has continued wheezing.  On room air, pulse oximetry reading is 90-91%.  Will plan for Hospital Medicine admission and further care. [AK]      ED Course User Index  [AK] Marisabel Vu MD                    Clinical Impression:   Final  diagnoses:  [J44.1] COPD with acute exacerbation (Primary)        ED Disposition Condition    Observation Stable                Marisabel Vu MD  10/27/23 0669

## 2023-10-28 LAB
ANION GAP SERPL CALC-SCNC: 8 MMOL/L (ref 8–16)
BASOPHILS # BLD AUTO: 0.03 K/UL (ref 0–0.2)
BASOPHILS NFR BLD: 0.5 % (ref 0–1.9)
BUN SERPL-MCNC: 16 MG/DL (ref 6–20)
CALCIUM SERPL-MCNC: 9.9 MG/DL (ref 8.7–10.5)
CHLORIDE SERPL-SCNC: 106 MMOL/L (ref 95–110)
CO2 SERPL-SCNC: 28 MMOL/L (ref 23–29)
CREAT SERPL-MCNC: 1.1 MG/DL (ref 0.5–1.4)
DIFFERENTIAL METHOD: ABNORMAL
EOSINOPHIL # BLD AUTO: 0.1 K/UL (ref 0–0.5)
EOSINOPHIL NFR BLD: 1.4 % (ref 0–8)
ERYTHROCYTE [DISTWIDTH] IN BLOOD BY AUTOMATED COUNT: 16.5 % (ref 11.5–14.5)
EST. GFR  (NO RACE VARIABLE): >60 ML/MIN/1.73 M^2
GLUCOSE SERPL-MCNC: 95 MG/DL (ref 70–110)
HCT VFR BLD AUTO: 33 % (ref 37–48.5)
HGB BLD-MCNC: 9.6 G/DL (ref 12–16)
IMM GRANULOCYTES # BLD AUTO: 0.02 K/UL (ref 0–0.04)
IMM GRANULOCYTES NFR BLD AUTO: 0.4 % (ref 0–0.5)
INR PPP: 2.7 (ref 0.8–1.2)
LYMPHOCYTES # BLD AUTO: 1 K/UL (ref 1–4.8)
LYMPHOCYTES NFR BLD: 17.2 % (ref 18–48)
MAGNESIUM SERPL-MCNC: 2.1 MG/DL (ref 1.6–2.6)
MCH RBC QN AUTO: 27 PG (ref 27–31)
MCHC RBC AUTO-ENTMCNC: 29.1 G/DL (ref 32–36)
MCV RBC AUTO: 93 FL (ref 82–98)
MONOCYTES # BLD AUTO: 0.7 K/UL (ref 0.3–1)
MONOCYTES NFR BLD: 11.9 % (ref 4–15)
NEUTROPHILS # BLD AUTO: 3.9 K/UL (ref 1.8–7.7)
NEUTROPHILS NFR BLD: 68.6 % (ref 38–73)
NRBC BLD-RTO: 0 /100 WBC
PHOSPHATE SERPL-MCNC: 5.2 MG/DL (ref 2.7–4.5)
PLATELET # BLD AUTO: 263 K/UL (ref 150–450)
PMV BLD AUTO: 9.9 FL (ref 9.2–12.9)
POCT GLUCOSE: 107 MG/DL (ref 70–110)
POCT GLUCOSE: 96 MG/DL (ref 70–110)
POCT GLUCOSE: 99 MG/DL (ref 70–110)
POTASSIUM SERPL-SCNC: 4.3 MMOL/L (ref 3.5–5.1)
PROTHROMBIN TIME: 27.9 SEC (ref 9–12.5)
RBC # BLD AUTO: 3.56 M/UL (ref 4–5.4)
SODIUM SERPL-SCNC: 142 MMOL/L (ref 136–145)
WBC # BLD AUTO: 5.71 K/UL (ref 3.9–12.7)

## 2023-10-28 PROCEDURE — 85610 PROTHROMBIN TIME: CPT | Performed by: INTERNAL MEDICINE

## 2023-10-28 PROCEDURE — 94640 AIRWAY INHALATION TREATMENT: CPT

## 2023-10-28 PROCEDURE — 80048 BASIC METABOLIC PNL TOTAL CA: CPT | Performed by: INTERNAL MEDICINE

## 2023-10-28 PROCEDURE — G0378 HOSPITAL OBSERVATION PER HR: HCPCS

## 2023-10-28 PROCEDURE — 25000242 PHARM REV CODE 250 ALT 637 W/ HCPCS: Performed by: INTERNAL MEDICINE

## 2023-10-28 PROCEDURE — 36415 COLL VENOUS BLD VENIPUNCTURE: CPT | Performed by: INTERNAL MEDICINE

## 2023-10-28 PROCEDURE — 94640 AIRWAY INHALATION TREATMENT: CPT | Mod: XB

## 2023-10-28 PROCEDURE — 25000003 PHARM REV CODE 250: Performed by: INTERNAL MEDICINE

## 2023-10-28 PROCEDURE — 83735 ASSAY OF MAGNESIUM: CPT | Performed by: INTERNAL MEDICINE

## 2023-10-28 PROCEDURE — 63600175 PHARM REV CODE 636 W HCPCS: Performed by: INTERNAL MEDICINE

## 2023-10-28 PROCEDURE — 63700000 PHARM REV CODE 250 ALT 637 W/O HCPCS: Performed by: INTERNAL MEDICINE

## 2023-10-28 PROCEDURE — 84100 ASSAY OF PHOSPHORUS: CPT | Performed by: INTERNAL MEDICINE

## 2023-10-28 PROCEDURE — 27000221 HC OXYGEN, UP TO 24 HOURS

## 2023-10-28 PROCEDURE — 94761 N-INVAS EAR/PLS OXIMETRY MLT: CPT

## 2023-10-28 PROCEDURE — 21400001 HC TELEMETRY ROOM

## 2023-10-28 PROCEDURE — 85025 COMPLETE CBC W/AUTO DIFF WBC: CPT | Performed by: INTERNAL MEDICINE

## 2023-10-28 RX ORDER — AZITHROMYCIN 250 MG/1
500 TABLET, FILM COATED ORAL ONCE
Status: COMPLETED | OUTPATIENT
Start: 2023-10-28 | End: 2023-10-28

## 2023-10-28 RX ORDER — BENZONATATE 100 MG/1
200 CAPSULE ORAL 3 TIMES DAILY PRN
Status: DISCONTINUED | OUTPATIENT
Start: 2023-10-28 | End: 2023-10-31 | Stop reason: HOSPADM

## 2023-10-28 RX ORDER — BENZONATATE 100 MG/1
100 CAPSULE ORAL 3 TIMES DAILY PRN
Status: DISCONTINUED | OUTPATIENT
Start: 2023-10-28 | End: 2023-10-28

## 2023-10-28 RX ORDER — IPRATROPIUM BROMIDE AND ALBUTEROL SULFATE 2.5; .5 MG/3ML; MG/3ML
3 SOLUTION RESPIRATORY (INHALATION) EVERY 4 HOURS
Status: DISCONTINUED | OUTPATIENT
Start: 2023-10-28 | End: 2023-10-31 | Stop reason: HOSPADM

## 2023-10-28 RX ORDER — GUAIFENESIN 100 MG/5ML
200 SOLUTION ORAL EVERY 4 HOURS PRN
Status: DISCONTINUED | OUTPATIENT
Start: 2023-10-28 | End: 2023-10-31 | Stop reason: HOSPADM

## 2023-10-28 RX ORDER — GUAIFENESIN 100 MG/5ML
200 SOLUTION ORAL EVERY 4 HOURS PRN
Status: DISCONTINUED | OUTPATIENT
Start: 2023-10-28 | End: 2023-10-28

## 2023-10-28 RX ORDER — AZITHROMYCIN 250 MG/1
250 TABLET, FILM COATED ORAL DAILY
Status: DISCONTINUED | OUTPATIENT
Start: 2023-10-29 | End: 2023-10-31 | Stop reason: HOSPADM

## 2023-10-28 RX ADMIN — ACETAMINOPHEN 650 MG: 325 TABLET ORAL at 08:10

## 2023-10-28 RX ADMIN — WARFARIN SODIUM 2.5 MG: 2.5 TABLET ORAL at 05:10

## 2023-10-28 RX ADMIN — IPRATROPIUM BROMIDE AND ALBUTEROL SULFATE 3 ML: 2.5; .5 SOLUTION RESPIRATORY (INHALATION) at 03:10

## 2023-10-28 RX ADMIN — CETIRIZINE HYDROCHLORIDE 10 MG: 10 TABLET, FILM COATED ORAL at 08:10

## 2023-10-28 RX ADMIN — SACUBITRIL AND VALSARTAN 1 TABLET: 24; 26 TABLET, FILM COATED ORAL at 08:10

## 2023-10-28 RX ADMIN — IPRATROPIUM BROMIDE AND ALBUTEROL SULFATE 3 ML: 2.5; .5 SOLUTION RESPIRATORY (INHALATION) at 11:10

## 2023-10-28 RX ADMIN — FLUTICASONE PROPIONATE 100 MCG: 50 SPRAY, METERED NASAL at 09:10

## 2023-10-28 RX ADMIN — IPRATROPIUM BROMIDE AND ALBUTEROL SULFATE 3 ML: 2.5; .5 SOLUTION RESPIRATORY (INHALATION) at 12:10

## 2023-10-28 RX ADMIN — IPRATROPIUM BROMIDE AND ALBUTEROL SULFATE 3 ML: 2.5; .5 SOLUTION RESPIRATORY (INHALATION) at 04:10

## 2023-10-28 RX ADMIN — AZITHROMYCIN MONOHYDRATE 500 MG: 250 TABLET ORAL at 10:10

## 2023-10-28 RX ADMIN — MONTELUKAST 10 MG: 10 TABLET, FILM COATED ORAL at 08:10

## 2023-10-28 RX ADMIN — IPRATROPIUM BROMIDE AND ALBUTEROL SULFATE 3 ML: 2.5; .5 SOLUTION RESPIRATORY (INHALATION) at 07:10

## 2023-10-28 RX ADMIN — ATORVASTATIN CALCIUM 40 MG: 20 TABLET, FILM COATED ORAL at 08:10

## 2023-10-28 RX ADMIN — SENNOSIDES AND DOCUSATE SODIUM 1 TABLET: 50; 8.6 TABLET ORAL at 08:10

## 2023-10-28 RX ADMIN — FUROSEMIDE 40 MG: 40 TABLET ORAL at 08:10

## 2023-10-28 RX ADMIN — EMPAGLIFLOZIN 10 MG: 10 TABLET, FILM COATED ORAL at 08:10

## 2023-10-28 RX ADMIN — PREDNISONE 40 MG: 20 TABLET ORAL at 08:10

## 2023-10-28 RX ADMIN — BENZONATATE 200 MG: 100 CAPSULE ORAL at 08:10

## 2023-10-28 NOTE — PLAN OF CARE
Resting comfortably in bed at this time.  VSS on RA and afebrile this shift.  Repositions self independently in bed and ambulating .   Free from injury or skin breakdown; Fall precautions maintained and call light in reach.  POC updated questions answered and comments acknowledged.  Purposeful hourly rounding completed this shift.    Problem: Adult Inpatient Plan of Care  Goal: Plan of Care Review  Outcome: Ongoing, Progressing  Goal: Patient-Specific Goal (Individualized)  Outcome: Ongoing, Progressing  Goal: Absence of Hospital-Acquired Illness or Injury  Outcome: Ongoing, Progressing  Goal: Optimal Comfort and Wellbeing  Outcome: Ongoing, Progressing  Goal: Readiness for Transition of Care  Outcome: Ongoing, Progressing     Problem: Bariatric Environmental Safety  Goal: Safety Maintained with Care  Outcome: Ongoing, Progressing     Problem: Adjustment to Illness COPD (Chronic Obstructive Pulmonary Disease)  Goal: Optimal Chronic Illness Coping  Outcome: Ongoing, Progressing     Problem: Functional Ability Impaired COPD (Chronic Obstructive Pulmonary Disease)  Goal: Optimal Level of Functional [place]  Outcome: Ongoing, Progressing     Problem: Infection COPD (Chronic Obstructive Pulmonary Disease)  Goal: Absence of Infection Signs and Symptoms  Outcome: Ongoing, Progressing     Problem: Oral Intake Inadequate COPD (Chronic Obstructive Pulmonary Disease)  Goal: Improved Nutrition Intake  Outcome: Ongoing, Progressing     Problem: Respiratory Compromise COPD (Chronic Obstructive Pulmonary Disease)  Goal: Effective Oxygenation and Ventilation  Outcome: Ongoing, Progressing     Problem: Diabetes Comorbidity  Goal: Blood Glucose Level Within Targeted Range  Outcome: Ongoing, Progressing

## 2023-10-28 NOTE — ED NOTES
"Called Aidan in resp concerning pt's order "inhalation treatment Q6H wake". Aidan confirmed he was on his way to the pt.  "

## 2023-10-28 NOTE — PROGRESS NOTES
[facility] Emergency Dept (Observation)  [facility]  Progress Note    Patient Name: Delaney Scott  MRN: [id number]  Patient Class: OP- Observation   Admission Date: 10/26/2023  Length of Stay: 0 days  Attending Physician: No att. providers found  Primary Care Provider: [facility]        Subjective:     Principal Problem:COPD exacerbation        HPI:  Patient is a 53 year old female with a PMH of COPD/Asthma, HTN, T2D, CHF, MVR on AC, S/p RA-Hystrectomy/BSO/Lysis of Adhesions and Cystoscopy in 10/2023 who presented with complaints of SOB that began the day PTA.  Patient states she continues to smoke 1 pack every 2-3 days.  She states her HFA and Nebulizer did not help, so she came to the ED for evaluation.  CXR in ED with the lung parenchyma stable in appearance.  There is no pneumothorax or pleural effusion. She was treated with IV steroids and Duonebs with mild improvement in SOB.  No f/c, n/v/d.  Has a mostly nonproductive cough.  No chest pain.        Overview/Hospital Course:  No notes on file    Interval History: Patient is awake and alert this morning.  No adverse events reported overnight.  Feeling better from admission, but still with SOB.  No chest pain.  Cough a little worse and now productive of dark white sputum.  No Fever/Chills.      Review of Systems   Constitutional:  Positive for activity change (decreased). Negative for chills and fever.   HENT:  Negative for ear pain.    Eyes:  Negative for pain.   Respiratory:  Positive for cough (now productive), shortness of breath (mildly imporved) and wheezing. Negative for stridor.    Cardiovascular:  Negative for chest pain, palpitations and leg swelling.   Gastrointestinal:  Negative for abdominal distention, abdominal pain, diarrhea, nausea and vomiting.   Genitourinary:  Negative for difficulty urinating and dysuria.   Musculoskeletal:  Negative for neck pain.   Skin:  Negative for wound.   Neurological:  Negative for dizziness and  headaches.   Psychiatric/Behavioral:  Negative for agitation, behavioral problems and confusion.      Objective:     Vital Signs (Most Recent):  Temp: 98 °F (36.7 °C) (10/28/23 0757)  Pulse: 81 (10/28/23 0958)  Resp: 18 (10/28/23 0734)  BP: (!) 106/54 (10/28/23 0757)  SpO2: (!) 94 % (10/28/23 0757) Vital Signs (24h Range):  Temp:  [97.5 °F (36.4 °C)-98 °F (36.7 °C)] 98 °F (36.7 °C)  Pulse:  [] 81  Resp:  [16-24] 18  SpO2:  [91 %-98 %] 94 %  BP: (106-135)/(54-66) 106/54        There is no height or weight on file to calculate BMI.  No intake or output data in the 24 hours ending 10/28/23 1056      Physical Exam  Constitutional:       General: She is not in acute distress.     Appearance: Normal appearance. She is obese. She is ill-appearing (mildly). She is not toxic-appearing.   HENT:      Head: Normocephalic and atraumatic.      Right Ear: External ear normal.      Left Ear: External ear normal.      Nose: Nose normal.      Mouth/Throat:      Mouth: Mucous membranes are moist.      Pharynx: Oropharynx is clear.   Eyes:      General: No scleral icterus.     Pupils: Pupils are equal, round, and reactive to light.   Neck:      Comments: No JVD  Cardiovascular:      Rate and Rhythm: Normal rate and regular rhythm.      Pulses: Normal pulses.      Heart sounds: Normal heart sounds.   Pulmonary:      Effort: Pulmonary effort is normal. No respiratory distress.      Breath sounds: No stridor. Wheezing present. No rales.      Comments: Decreased air movement  Abdominal:      General: Abdomen is flat. Bowel sounds are normal. There is no distension.      Tenderness: There is no abdominal tenderness.   Musculoskeletal:         General: Normal range of motion.      Cervical back: Normal range of motion and neck supple. No rigidity.      Right lower leg: No edema.      Left lower leg: No edema.   Skin:     General: Skin is warm and dry.      Findings: No rash.   Neurological:      General: No focal deficit present.       Mental Status: She is alert. Mental status is at baseline.      Cranial Nerves: No cranial nerve deficit.      Motor: No weakness.   Psychiatric:         Mood and Affect: Mood normal.         Behavior: Behavior normal.             Significant Labs: All pertinent labs within the past 24 hours have been reviewed.    Significant Imaging: I have reviewed all pertinent imaging results/findings within the past 24 hours.      Assessment/Plan:      * COPD exacerbation  Patient presented with complaints of SOB that began the day PTA.  Patient states she continues to smoke 1 pack every 2-3 days.  She states her HFA and Nebulizer did not help, so she came to the ED for evaluation.  CXR in ED with the lung parenchyma stable in appearance.  There is no pneumothorax or pleural effusion. She was treated in ED with IV steroids and Duonebs with mild improvement in SOB.  She is feeling a little better, but still with significant wheezing and cough now productive.  Pox stable on 2L today.    Plan:  Continue with Dunnebs - change to q4  Continue with Prednisone 40mg q day x 5 days  Add Azithromycin x 5 days  Continue LABA/ICS  O2NC as needed to keep Pox 88-92%        Type 2 diabetes mellitus, without long-term current use of insulin  A1C in 9/2023 was 5.6  Home Meds:  Ozempic, Jardiance.  -Hold Ozempic and continue Jardiance  -Diabetic Diet  -Low correction dose SSI      Chronic systolic heart failure  No evidence of volume overload  TTE in 9/2023 with EF 50-55%, LVDF indeterminate, prosthetic MV  -Continue Jardiance and Entresto  -Contiune Lasix        Hyperlipidemia  -Continue statin      Sleep apnea  -Continue CPAP      Severe obesity (BMI >= 40)  There is no height or weight on file to calculate BMI. Morbid obesity complicates all aspects of disease management from diagnostic modalities to treatment. Weight loss encouraged and health benefits explained to patient.         S/P MVR (mitral valve replacement)  INR on admission was  2.5  -Continue Warfarin  -Follow daily INR      Essential hypertension  SBP stable  -See above        VTE Risk Mitigation (From admission, onward)         Ordered     warfarin (COUMADIN) tablet 2.5 mg  Every Tues, Thurs, Sat, Sun         10/27/23 0728     warfarin (COUMADIN) tablet 5 mg  Every Mon, Wed, Fri         10/27/23 0728     IP VTE HIGH RISK PATIENT  Once         10/27/23 0728     Place sequential compression device  Until discontinued         10/27/23 0728                Discharge Planning   LINO:      Code Status: Full Code   Is the patient medically ready for discharge?:     Reason for patient still in hospital (select all that apply): Patient trending condition, Treatment and Consult recommendations                     Prem Bernard MD  Department of Hospital Medicine   [facility] - Emergency Dept (Observation)

## 2023-10-28 NOTE — ASSESSMENT & PLAN NOTE
Patient presented with complaints of SOB that began the day PTA.  Patient states she continues to smoke 1 pack every 2-3 days.  She states her HFA and Nebulizer did not help, so she came to the ED for evaluation.  CXR in ED with the lung parenchyma stable in appearance.  There is no pneumothorax or pleural effusion. She was treated in ED with IV steroids and Duonebs with mild improvement in SOB.  She is feeling a little better, but still with significant wheezing and cough now productive.  Pox stable on 2L today.    Plan:  Continue with Dunnebs - change to q4  Continue with Prednisone 40mg q day x 5 days  Add Azithromycin x 5 days  Continue LABA/ICS  O2NC as needed to keep Pox 88-92%

## 2023-10-28 NOTE — ED NOTES
AM rounding: pt lying in bed on R side, HOB low smith's, awake, alert, coughing, pre-breathing treatment. RR even/unlabored, denies pain at present, discomfort level 3/10, 2L NC, NADN, pt on continue cardiac monitor, will continue to monitor.

## 2023-10-28 NOTE — ED NOTES
Pt took at home med Ellipta 62.5 mcg one puff instead. RT informed. States she will educate pt on new dose for next administration.

## 2023-10-28 NOTE — ED NOTES
"Discussed pt with Aidan in resp, stating she has a treatment scheduled for 2000. Aidan replied "I will see her when I can."  "

## 2023-10-28 NOTE — SUBJECTIVE & OBJECTIVE
Interval History: Patient is awake and alert this morning.  No adverse events reported overnight.  Feeling better from admission, but still with SOB.  No chest pain.  Cough a little worse and now productive of dark white sputum.  No Fever/Chills.      Review of Systems   Constitutional:  Positive for activity change (decreased). Negative for chills and fever.   HENT:  Negative for ear pain.    Eyes:  Negative for pain.   Respiratory:  Positive for cough (now productive), shortness of breath (mildly imporved) and wheezing. Negative for stridor.    Cardiovascular:  Negative for chest pain, palpitations and leg swelling.   Gastrointestinal:  Negative for abdominal distention, abdominal pain, diarrhea, nausea and vomiting.   Genitourinary:  Negative for difficulty urinating and dysuria.   Musculoskeletal:  Negative for neck pain.   Skin:  Negative for wound.   Neurological:  Negative for dizziness and headaches.   Psychiatric/Behavioral:  Negative for agitation, behavioral problems and confusion.      Objective:     Vital Signs (Most Recent):  Temp: 98 °F (36.7 °C) (10/28/23 0757)  Pulse: 81 (10/28/23 0958)  Resp: 18 (10/28/23 0734)  BP: (!) 106/54 (10/28/23 0757)  SpO2: (!) 94 % (10/28/23 0757) Vital Signs (24h Range):  Temp:  [97.5 °F (36.4 °C)-98 °F (36.7 °C)] 98 °F (36.7 °C)  Pulse:  [] 81  Resp:  [16-24] 18  SpO2:  [91 %-98 %] 94 %  BP: (106-135)/(54-66) 106/54        There is no height or weight on file to calculate BMI.  No intake or output data in the 24 hours ending 10/28/23 1056      Physical Exam  Constitutional:       General: She is not in acute distress.     Appearance: Normal appearance. She is obese. She is ill-appearing (mildly). She is not toxic-appearing.   HENT:      Head: Normocephalic and atraumatic.      Right Ear: External ear normal.      Left Ear: External ear normal.      Nose: Nose normal.      Mouth/Throat:      Mouth: Mucous membranes are moist.      Pharynx: Oropharynx is clear.    Eyes:      General: No scleral icterus.     Pupils: Pupils are equal, round, and reactive to light.   Neck:      Comments: No JVD  Cardiovascular:      Rate and Rhythm: Normal rate and regular rhythm.      Pulses: Normal pulses.      Heart sounds: Normal heart sounds.   Pulmonary:      Effort: Pulmonary effort is normal. No respiratory distress.      Breath sounds: No stridor. Wheezing present. No rales.      Comments: Decreased air movement  Abdominal:      General: Abdomen is flat. Bowel sounds are normal. There is no distension.      Tenderness: There is no abdominal tenderness.   Musculoskeletal:         General: Normal range of motion.      Cervical back: Normal range of motion and neck supple. No rigidity.      Right lower leg: No edema.      Left lower leg: No edema.   Skin:     General: Skin is warm and dry.      Findings: No rash.   Neurological:      General: No focal deficit present.      Mental Status: She is alert. Mental status is at baseline.      Cranial Nerves: No cranial nerve deficit.      Motor: No weakness.   Psychiatric:         Mood and Affect: Mood normal.         Behavior: Behavior normal.             Significant Labs: All pertinent labs within the past 24 hours have been reviewed.    Significant Imaging: I have reviewed all pertinent imaging results/findings within the past 24 hours.

## 2023-10-28 NOTE — PLAN OF CARE
3:38 PM  Patient received at bed number 317. A Ox4. Vitals stable in room air. Repositions self independently in bed and can walk in the room independently. telemetry continued.

## 2023-10-29 LAB
ANION GAP SERPL CALC-SCNC: 7 MMOL/L (ref 8–16)
BASOPHILS # BLD AUTO: 0.03 K/UL (ref 0–0.2)
BASOPHILS NFR BLD: 0.6 % (ref 0–1.9)
BUN SERPL-MCNC: 15 MG/DL (ref 6–20)
CALCIUM SERPL-MCNC: 9.4 MG/DL (ref 8.7–10.5)
CHLORIDE SERPL-SCNC: 105 MMOL/L (ref 95–110)
CO2 SERPL-SCNC: 30 MMOL/L (ref 23–29)
CREAT SERPL-MCNC: 1.1 MG/DL (ref 0.5–1.4)
DIFFERENTIAL METHOD: ABNORMAL
EOSINOPHIL # BLD AUTO: 0.1 K/UL (ref 0–0.5)
EOSINOPHIL NFR BLD: 2.7 % (ref 0–8)
ERYTHROCYTE [DISTWIDTH] IN BLOOD BY AUTOMATED COUNT: 16.6 % (ref 11.5–14.5)
EST. GFR  (NO RACE VARIABLE): >60 ML/MIN/1.73 M^2
GLUCOSE SERPL-MCNC: 90 MG/DL (ref 70–110)
HCT VFR BLD AUTO: 35.5 % (ref 37–48.5)
HGB BLD-MCNC: 10.3 G/DL (ref 12–16)
IMM GRANULOCYTES # BLD AUTO: 0.02 K/UL (ref 0–0.04)
IMM GRANULOCYTES NFR BLD AUTO: 0.4 % (ref 0–0.5)
INR PPP: 2.5 (ref 0.8–1.2)
LYMPHOCYTES # BLD AUTO: 0.8 K/UL (ref 1–4.8)
LYMPHOCYTES NFR BLD: 15.5 % (ref 18–48)
MAGNESIUM SERPL-MCNC: 1.9 MG/DL (ref 1.6–2.6)
MCH RBC QN AUTO: 26.9 PG (ref 27–31)
MCHC RBC AUTO-ENTMCNC: 29 G/DL (ref 32–36)
MCV RBC AUTO: 93 FL (ref 82–98)
MONOCYTES # BLD AUTO: 0.5 K/UL (ref 0.3–1)
MONOCYTES NFR BLD: 10.8 % (ref 4–15)
NEUTROPHILS # BLD AUTO: 3.4 K/UL (ref 1.8–7.7)
NEUTROPHILS NFR BLD: 70 % (ref 38–73)
NRBC BLD-RTO: 0 /100 WBC
PHOSPHATE SERPL-MCNC: 4.2 MG/DL (ref 2.7–4.5)
PLATELET # BLD AUTO: 271 K/UL (ref 150–450)
PMV BLD AUTO: 9.5 FL (ref 9.2–12.9)
POCT GLUCOSE: 131 MG/DL (ref 70–110)
POCT GLUCOSE: 96 MG/DL (ref 70–110)
POTASSIUM SERPL-SCNC: 3.9 MMOL/L (ref 3.5–5.1)
PROTHROMBIN TIME: 25.9 SEC (ref 9–12.5)
RBC # BLD AUTO: 3.83 M/UL (ref 4–5.4)
SODIUM SERPL-SCNC: 142 MMOL/L (ref 136–145)
WBC # BLD AUTO: 4.9 K/UL (ref 3.9–12.7)

## 2023-10-29 PROCEDURE — 84100 ASSAY OF PHOSPHORUS: CPT | Performed by: INTERNAL MEDICINE

## 2023-10-29 PROCEDURE — 25000003 PHARM REV CODE 250: Performed by: INTERNAL MEDICINE

## 2023-10-29 PROCEDURE — 63600175 PHARM REV CODE 636 W HCPCS: Performed by: INTERNAL MEDICINE

## 2023-10-29 PROCEDURE — 25000242 PHARM REV CODE 250 ALT 637 W/ HCPCS: Performed by: INTERNAL MEDICINE

## 2023-10-29 PROCEDURE — 85610 PROTHROMBIN TIME: CPT | Performed by: INTERNAL MEDICINE

## 2023-10-29 PROCEDURE — 94640 AIRWAY INHALATION TREATMENT: CPT

## 2023-10-29 PROCEDURE — 36415 COLL VENOUS BLD VENIPUNCTURE: CPT | Performed by: INTERNAL MEDICINE

## 2023-10-29 PROCEDURE — 21400001 HC TELEMETRY ROOM

## 2023-10-29 PROCEDURE — 99900035 HC TECH TIME PER 15 MIN (STAT)

## 2023-10-29 PROCEDURE — 94761 N-INVAS EAR/PLS OXIMETRY MLT: CPT

## 2023-10-29 PROCEDURE — 85025 COMPLETE CBC W/AUTO DIFF WBC: CPT | Performed by: INTERNAL MEDICINE

## 2023-10-29 PROCEDURE — 63700000 PHARM REV CODE 250 ALT 637 W/O HCPCS: Performed by: INTERNAL MEDICINE

## 2023-10-29 PROCEDURE — 80048 BASIC METABOLIC PNL TOTAL CA: CPT | Performed by: INTERNAL MEDICINE

## 2023-10-29 PROCEDURE — 83735 ASSAY OF MAGNESIUM: CPT | Performed by: INTERNAL MEDICINE

## 2023-10-29 PROCEDURE — 27000221 HC OXYGEN, UP TO 24 HOURS

## 2023-10-29 PROCEDURE — 94640 AIRWAY INHALATION TREATMENT: CPT | Mod: XB

## 2023-10-29 RX ADMIN — ATORVASTATIN CALCIUM 40 MG: 20 TABLET, FILM COATED ORAL at 08:10

## 2023-10-29 RX ADMIN — EMPAGLIFLOZIN 10 MG: 10 TABLET, FILM COATED ORAL at 08:10

## 2023-10-29 RX ADMIN — FUROSEMIDE 40 MG: 40 TABLET ORAL at 08:10

## 2023-10-29 RX ADMIN — CETIRIZINE HYDROCHLORIDE 10 MG: 10 TABLET, FILM COATED ORAL at 08:10

## 2023-10-29 RX ADMIN — MONTELUKAST 10 MG: 10 TABLET, FILM COATED ORAL at 08:10

## 2023-10-29 RX ADMIN — IPRATROPIUM BROMIDE AND ALBUTEROL SULFATE 3 ML: 2.5; .5 SOLUTION RESPIRATORY (INHALATION) at 12:10

## 2023-10-29 RX ADMIN — PREDNISONE 40 MG: 20 TABLET ORAL at 08:10

## 2023-10-29 RX ADMIN — GUAIFENESIN 200 MG: 100 SOLUTION ORAL at 08:10

## 2023-10-29 RX ADMIN — SACUBITRIL AND VALSARTAN 1 TABLET: 24; 26 TABLET, FILM COATED ORAL at 08:10

## 2023-10-29 RX ADMIN — BENZONATATE 200 MG: 100 CAPSULE ORAL at 02:10

## 2023-10-29 RX ADMIN — FLUTICASONE FUROATE 1 PUFF: 200 POWDER RESPIRATORY (INHALATION) at 08:10

## 2023-10-29 RX ADMIN — IPRATROPIUM BROMIDE AND ALBUTEROL SULFATE 3 ML: 2.5; .5 SOLUTION RESPIRATORY (INHALATION) at 08:10

## 2023-10-29 RX ADMIN — SENNOSIDES AND DOCUSATE SODIUM 1 TABLET: 50; 8.6 TABLET ORAL at 08:10

## 2023-10-29 RX ADMIN — IPRATROPIUM BROMIDE AND ALBUTEROL SULFATE 3 ML: 2.5; .5 SOLUTION RESPIRATORY (INHALATION) at 03:10

## 2023-10-29 RX ADMIN — WARFARIN SODIUM 2.5 MG: 2.5 TABLET ORAL at 05:10

## 2023-10-29 RX ADMIN — IPRATROPIUM BROMIDE AND ALBUTEROL SULFATE 3 ML: 2.5; .5 SOLUTION RESPIRATORY (INHALATION) at 04:10

## 2023-10-29 RX ADMIN — FLUTICASONE PROPIONATE 100 MCG: 50 SPRAY, METERED NASAL at 08:10

## 2023-10-29 RX ADMIN — AZITHROMYCIN MONOHYDRATE 250 MG: 250 TABLET ORAL at 08:10

## 2023-10-29 RX ADMIN — BENZONATATE 200 MG: 100 CAPSULE ORAL at 04:10

## 2023-10-29 NOTE — PLAN OF CARE
VSS on RA and afebrile this shift.   Wheezing and coughing present.  Tolerating pain on PO. Repositions self independently in bed and ambulating around room.   Free from injury or skin breakdown; Fall precautions maintained and call light in reach.  POC updated questions answered and comments acknowledged.  Purposeful hourly rounding completed this shift.    Problem: Adult Inpatient Plan of Care  Goal: Plan of Care Review  Outcome: Ongoing, Progressing  Goal: Patient-Specific Goal (Individualized)  Outcome: Ongoing, Progressing  Goal: Absence of Hospital-Acquired Illness or Injury  Outcome: Ongoing, Progressing  Goal: Optimal Comfort and Wellbeing  Outcome: Ongoing, Progressing  Goal: Readiness for Transition of Care  Outcome: Ongoing, Progressing     Problem: Bariatric Environmental Safety  Goal: Safety Maintained with Care  Outcome: Ongoing, Progressing     Problem: Adjustment to Illness COPD (Chronic Obstructive Pulmonary Disease)  Goal: Optimal Chronic Illness Coping  Outcome: Ongoing, Progressing     Problem: Functional Ability Impaired COPD (Chronic Obstructive Pulmonary Disease)  Goal: Optimal Level of Functional [place]  Outcome: Ongoing, Progressing     Problem: Infection COPD (Chronic Obstructive Pulmonary Disease)  Goal: Absence of Infection Signs and Symptoms  Outcome: Ongoing, Progressing     Problem: Oral Intake Inadequate COPD (Chronic Obstructive Pulmonary Disease)  Goal: Improved Nutrition Intake  Outcome: Ongoing, Progressing     Problem: Respiratory Compromise COPD (Chronic Obstructive Pulmonary Disease)  Goal: Effective Oxygenation and Ventilation  Outcome: Ongoing, Progressing     Problem: Diabetes Comorbidity  Goal: Blood Glucose Level Within Targeted Range  Outcome: Ongoing, Progressing

## 2023-10-29 NOTE — ASSESSMENT & PLAN NOTE
A1C in 9/2023 was 5.6  Home Meds:  Ozempic, Jardiance.  -Hold Ozempic and continue Jardiance  -Diabetic Diet  -Low correction dose SSI  -Monitor and adjust now that she is on steroids

## 2023-10-29 NOTE — PLAN OF CARE
Pt remained free of falls and injuries throughout shift. AAOx4. Pt calm and cooperative. Purposeful hourly rounding performed. Pt swallows meds whole. IV flushed and saline locked. Managed c/o lower back pain with PRN tylenol. Pt has productive cough, PRN Tessalon Perles given. Patient ambulates independently. VSS on room air. Pt resting comfortably in bed, denies pain, denies needs at this time. Bed low and locked, call light in reach. Side rails up x2. Will continue to monitor.

## 2023-10-29 NOTE — SUBJECTIVE & OBJECTIVE
Interval History: Patient is awake and alert this morning.  No adverse events reported overnight.  Feeling better from admission, but still with SOB, Productive Cough, Wheezing.  No chest pain.  No Fever/Chills.  Still needs continued treatment.    Review of Systems   Constitutional:  Positive for activity change (decreased). Negative for chills and fever.   HENT:  Negative for ear pain.    Eyes:  Negative for pain.   Respiratory:  Positive for cough (now productive), shortness of breath (mildly improved) and wheezing. Negative for stridor.    Cardiovascular:  Negative for chest pain, palpitations and leg swelling.   Gastrointestinal:  Negative for abdominal distention, abdominal pain, diarrhea, nausea and vomiting.   Genitourinary:  Negative for difficulty urinating and dysuria.   Musculoskeletal:  Negative for neck pain.   Skin:  Negative for wound.   Neurological:  Negative for dizziness and headaches.   Psychiatric/Behavioral:  Negative for agitation, behavioral problems and confusion.      Objective:     Vital Signs (Most Recent):  Temp: 97.8 °F (36.6 °C) (10/29/23 0723)  Pulse: 80 (10/29/23 0824)  Resp: 16 (10/29/23 0824)  BP: 134/63 (10/29/23 0723)  SpO2: 96 % (10/29/23 0824) Vital Signs (24h Range):  Temp:  [97.8 °F (36.6 °C)-98.9 °F (37.2 °C)] 97.8 °F (36.6 °C)  Pulse:  [] 80  Resp:  [16-20] 16  SpO2:  [93 %-100 %] 96 %  BP: (116-134)/(57-75) 134/63     Weight: (!) 139.3 kg (307 lb)  Body mass index is 48.08 kg/m².    Intake/Output Summary (Last 24 hours) at 10/29/2023 0977  Last data filed at 10/29/2023 0427  Gross per 24 hour   Intake 390 ml   Output --   Net 390 ml         Physical Exam  Constitutional:       General: She is not in acute distress.     Appearance: Normal appearance. She is obese. She is ill-appearing (mildly). She is not toxic-appearing.   HENT:      Head: Normocephalic and atraumatic.      Right Ear: External ear normal.      Left Ear: External ear normal.      Nose: Nose normal.       Mouth/Throat:      Mouth: Mucous membranes are moist.      Pharynx: Oropharynx is clear.   Eyes:      General: No scleral icterus.     Pupils: Pupils are equal, round, and reactive to light.   Neck:      Comments: No JVD  Cardiovascular:      Rate and Rhythm: Normal rate and regular rhythm.      Pulses: Normal pulses.      Heart sounds: Normal heart sounds.   Pulmonary:      Effort: Pulmonary effort is normal. No respiratory distress.      Breath sounds: No stridor. Wheezing present. No rales.      Comments: Decreased air movement  Abdominal:      General: Abdomen is flat. Bowel sounds are normal. There is no distension.      Tenderness: There is no abdominal tenderness.   Musculoskeletal:         General: Normal range of motion.      Cervical back: Normal range of motion and neck supple. No rigidity.      Right lower leg: No edema.      Left lower leg: No edema.   Skin:     General: Skin is warm and dry.      Findings: No rash.   Neurological:      General: No focal deficit present.      Mental Status: She is alert. Mental status is at baseline.      Cranial Nerves: No cranial nerve deficit.      Motor: No weakness.   Psychiatric:         Mood and Affect: Mood normal.         Behavior: Behavior normal.             Significant Labs: All pertinent labs within the past 24 hours have been reviewed.    Significant Imaging: I have reviewed all pertinent imaging results/findings within the past 24 hours.

## 2023-10-29 NOTE — PROGRESS NOTES
[facility] Medicine  Progress Note    Patient Name: Delaney Scott  MRN: [id number]  Patient Class: IP- Inpatient   Admission Date: 10/26/2023  Length of Stay: 0 days  Attending Physician: Prem Bernard MD  Primary Care Provider: [facility]        Subjective:     Principal Problem:COPD exacerbation        HPI:  Patient is a 53 year old female with a PMH of COPD/Asthma, HTN, T2D, CHF, MVR on AC, S/p RA-Hystrectomy/BSO/Lysis of Adhesions and Cystoscopy in 10/2023 who presented with complaints of SOB that began the day PTA.  Patient states she continues to smoke 1 pack every 2-3 days.  She states her HFA and Nebulizer did not help, so she came to the ED for evaluation.  CXR in ED with the lung parenchyma stable in appearance.  There is no pneumothorax or pleural effusion. She was treated with IV steroids and Duonebs with mild improvement in SOB.  No f/c, n/v/d.  Has a mostly nonproductive cough.  No chest pain.        Interval History: Patient is awake and alert this morning.  No adverse events reported overnight.  Feeling better from admission, but still with SOB, Productive Cough, Wheezing.  No chest pain.  No Fever/Chills.  Still needs continued treatment.    Review of Systems   Constitutional:  Positive for activity change (decreased). Negative for chills and fever.   HENT:  Negative for ear pain.    Eyes:  Negative for pain.   Respiratory:  Positive for cough (now productive), shortness of breath (mildly improved) and wheezing. Negative for stridor.    Cardiovascular:  Negative for chest pain, palpitations and leg swelling.   Gastrointestinal:  Negative for abdominal distention, abdominal pain, diarrhea, nausea and vomiting.   Genitourinary:  Negative for difficulty urinating and dysuria.   Musculoskeletal:  Negative for neck pain.   Skin:  Negative for wound.   Neurological:  Negative for dizziness and headaches.   Psychiatric/Behavioral:  Negative for agitation,  behavioral problems and confusion.      Objective:     Vital Signs (Most Recent):  Temp: 97.8 °F (36.6 °C) (10/29/23 0723)  Pulse: 80 (10/29/23 0824)  Resp: 16 (10/29/23 0824)  BP: 134/63 (10/29/23 0723)  SpO2: 96 % (10/29/23 0824) Vital Signs (24h Range):  Temp:  [97.8 °F (36.6 °C)-98.9 °F (37.2 °C)] 97.8 °F (36.6 °C)  Pulse:  [] 80  Resp:  [16-20] 16  SpO2:  [93 %-100 %] 96 %  BP: (116-134)/(57-75) 134/63     Weight: (!) 139.3 kg (307 lb)  Body mass index is 48.08 kg/m².    Intake/Output Summary (Last 24 hours) at 10/29/2023 0926  Last data filed at 10/29/2023 0427  Gross per 24 hour   Intake 390 ml   Output --   Net 390 ml         Physical Exam  Constitutional:       General: She is not in acute distress.     Appearance: Normal appearance. She is obese. She is ill-appearing (mildly). She is not toxic-appearing.   HENT:      Head: Normocephalic and atraumatic.      Right Ear: External ear normal.      Left Ear: External ear normal.      Nose: Nose normal.      Mouth/Throat:      Mouth: Mucous membranes are moist.      Pharynx: Oropharynx is clear.   Eyes:      General: No scleral icterus.     Pupils: Pupils are equal, round, and reactive to light.   Neck:      Comments: No JVD  Cardiovascular:      Rate and Rhythm: Normal rate and regular rhythm.      Pulses: Normal pulses.      Heart sounds: Normal heart sounds.   Pulmonary:      Effort: Pulmonary effort is normal. No respiratory distress.      Breath sounds: No stridor. Wheezing present. No rales.      Comments: Decreased air movement  Abdominal:      General: Abdomen is flat. Bowel sounds are normal. There is no distension.      Tenderness: There is no abdominal tenderness.   Musculoskeletal:         General: Normal range of motion.      Cervical back: Normal range of motion and neck supple. No rigidity.      Right lower leg: No edema.      Left lower leg: No edema.   Skin:     General: Skin is warm and dry.      Findings: No rash.   Neurological:       General: No focal deficit present.      Mental Status: She is alert. Mental status is at baseline.      Cranial Nerves: No cranial nerve deficit.      Motor: No weakness.   Psychiatric:         Mood and Affect: Mood normal.         Behavior: Behavior normal.             Significant Labs: All pertinent labs within the past 24 hours have been reviewed.    Significant Imaging: I have reviewed all pertinent imaging results/findings within the past 24 hours.      Assessment/Plan:      * COPD exacerbation  Patient presented with complaints of SOB that began the day PTA.  Patient states she continues to smoke 1 pack every 2-3 days.  She states her HFA and Nebulizer did not help, so she came to the ED for evaluation.  CXR in ED with the lung parenchyma stable in appearance.  There is no pneumothorax or pleural effusion. She was treated in ED with IV steroids and Duonebs with mild improvement in SOB.  She is improving, but still with decreased air movement and wheezing.  Pox low-normal on RA.  Still with POWELL.  Needs another day of treatment here.    Plan:  Continue with Dunnebs q4  Continue Steroids with Prednisone 40mg q day - day #3/5 of steroids  Continue Azithromycin - day #2/5  Continue LABA/ICS  Continue with antitussives prn  O2NC as needed to keep Pox 88-92%        Type 2 diabetes mellitus, without long-term current use of insulin  A1C in 9/2023 was 5.6  Home Meds:  Ozempic, Jardiance.  -Hold Ozempic and continue Jardiance  -Diabetic Diet  -Low correction dose SSI  -Monitor and adjust now that she is on steroids      Chronic systolic heart failure  No evidence of volume overload  TTE in 9/2023 with EF 50-55%, LVDF indeterminate, prosthetic MV  -Continue Jardiance and Entresto  -Contiune Lasix        Hyperlipidemia  -Continue statin      Sleep apnea  -Continue CPAP qHS      Severe obesity (BMI >= 40)  Body mass index is 48.08 kg/m². Morbid obesity complicates all aspects of disease management from diagnostic  modalities to treatment. Weight loss encouraged and health benefits explained to patient.         S/P MVR (mitral valve replacement)  INR on admission was 2.5 and stable today at 2.5.  -Continue Warfarin  -Follow daily INR      Essential hypertension  SBP stable  -See above      VTE Risk Mitigation (From admission, onward)         Ordered     warfarin (COUMADIN) tablet 2.5 mg  Every Tues, Thurs, Sat, Sun         10/27/23 0728     warfarin (COUMADIN) tablet 5 mg  Every Mon, Wed, Fri         10/27/23 0728     IP VTE HIGH RISK PATIENT  Once         10/27/23 0728     Place sequential compression device  Until discontinued         10/27/23 0728                Discharge Planning   LINO:      Code Status: Full Code   Is the patient medically ready for discharge?:     Reason for patient still in hospital (select all that apply): Patient trending condition, Treatment and Consult recommendations                     Prem Bernard MD  Department of Hospital Medicine   [facility] ([place])

## 2023-10-29 NOTE — ASSESSMENT & PLAN NOTE
Patient presented with complaints of SOB that began the day PTA.  Patient states she continues to smoke 1 pack every 2-3 days.  She states her HFA and Nebulizer did not help, so she came to the ED for evaluation.  CXR in ED with the lung parenchyma stable in appearance.  There is no pneumothorax or pleural effusion. She was treated in ED with IV steroids and Duonebs with mild improvement in SOB.  She is improving, but still with decreased air movement and wheezing.  Pox low-normal on RA.  Still with POWELL.  Needs another day of treatment here.    Plan:  Continue with Dunnebs q4  Continue Steroids with Prednisone 40mg q day - day #3/5 of steroids  Continue Azithromycin - day #2/5  Continue LABA/ICS  Continue with antitussives prn  O2NC as needed to keep Pox 88-92%

## 2023-10-30 LAB
ANION GAP SERPL CALC-SCNC: 9 MMOL/L (ref 8–16)
BASOPHILS # BLD AUTO: 0.04 K/UL (ref 0–0.2)
BASOPHILS NFR BLD: 0.8 % (ref 0–1.9)
BUN SERPL-MCNC: 16 MG/DL (ref 6–20)
CALCIUM SERPL-MCNC: 9.4 MG/DL (ref 8.7–10.5)
CHLORIDE SERPL-SCNC: 102 MMOL/L (ref 95–110)
CO2 SERPL-SCNC: 29 MMOL/L (ref 23–29)
CREAT SERPL-MCNC: 1.1 MG/DL (ref 0.5–1.4)
DIFFERENTIAL METHOD: ABNORMAL
EOSINOPHIL # BLD AUTO: 0.1 K/UL (ref 0–0.5)
EOSINOPHIL NFR BLD: 1.1 % (ref 0–8)
ERYTHROCYTE [DISTWIDTH] IN BLOOD BY AUTOMATED COUNT: 16.4 % (ref 11.5–14.5)
EST. GFR  (NO RACE VARIABLE): >60 ML/MIN/1.73 M^2
GLUCOSE SERPL-MCNC: 88 MG/DL (ref 70–110)
HCT VFR BLD AUTO: 34.1 % (ref 37–48.5)
HGB BLD-MCNC: 10.1 G/DL (ref 12–16)
IMM GRANULOCYTES # BLD AUTO: 0.01 K/UL (ref 0–0.04)
IMM GRANULOCYTES NFR BLD AUTO: 0.2 % (ref 0–0.5)
INR PPP: 2.5 (ref 0.8–1.2)
LYMPHOCYTES # BLD AUTO: 1 K/UL (ref 1–4.8)
LYMPHOCYTES NFR BLD: 20.6 % (ref 18–48)
MAGNESIUM SERPL-MCNC: 1.9 MG/DL (ref 1.6–2.6)
MCH RBC QN AUTO: 27 PG (ref 27–31)
MCHC RBC AUTO-ENTMCNC: 29.6 G/DL (ref 32–36)
MCV RBC AUTO: 91 FL (ref 82–98)
MONOCYTES # BLD AUTO: 0.8 K/UL (ref 0.3–1)
MONOCYTES NFR BLD: 16.6 % (ref 4–15)
NEUTROPHILS # BLD AUTO: 2.9 K/UL (ref 1.8–7.7)
NEUTROPHILS NFR BLD: 60.7 % (ref 38–73)
NRBC BLD-RTO: 0 /100 WBC
PHOSPHATE SERPL-MCNC: 3.9 MG/DL (ref 2.7–4.5)
PLATELET # BLD AUTO: 251 K/UL (ref 150–450)
PMV BLD AUTO: 9.8 FL (ref 9.2–12.9)
POCT GLUCOSE: 132 MG/DL (ref 70–110)
POCT GLUCOSE: 79 MG/DL (ref 70–110)
POCT GLUCOSE: 93 MG/DL (ref 70–110)
POTASSIUM SERPL-SCNC: 3.6 MMOL/L (ref 3.5–5.1)
PROTHROMBIN TIME: 25.6 SEC (ref 9–12.5)
RBC # BLD AUTO: 3.74 M/UL (ref 4–5.4)
SODIUM SERPL-SCNC: 140 MMOL/L (ref 136–145)
WBC # BLD AUTO: 4.76 K/UL (ref 3.9–12.7)

## 2023-10-30 PROCEDURE — 63700000 PHARM REV CODE 250 ALT 637 W/O HCPCS: Performed by: INTERNAL MEDICINE

## 2023-10-30 PROCEDURE — 94640 AIRWAY INHALATION TREATMENT: CPT

## 2023-10-30 PROCEDURE — 63600175 PHARM REV CODE 636 W HCPCS: Performed by: INTERNAL MEDICINE

## 2023-10-30 PROCEDURE — 36415 COLL VENOUS BLD VENIPUNCTURE: CPT | Performed by: INTERNAL MEDICINE

## 2023-10-30 PROCEDURE — 25000003 PHARM REV CODE 250: Performed by: INTERNAL MEDICINE

## 2023-10-30 PROCEDURE — 80048 BASIC METABOLIC PNL TOTAL CA: CPT | Performed by: INTERNAL MEDICINE

## 2023-10-30 PROCEDURE — 85610 PROTHROMBIN TIME: CPT | Performed by: INTERNAL MEDICINE

## 2023-10-30 PROCEDURE — 83735 ASSAY OF MAGNESIUM: CPT | Performed by: INTERNAL MEDICINE

## 2023-10-30 PROCEDURE — 84100 ASSAY OF PHOSPHORUS: CPT | Performed by: INTERNAL MEDICINE

## 2023-10-30 PROCEDURE — 85025 COMPLETE CBC W/AUTO DIFF WBC: CPT | Performed by: INTERNAL MEDICINE

## 2023-10-30 PROCEDURE — 94761 N-INVAS EAR/PLS OXIMETRY MLT: CPT

## 2023-10-30 PROCEDURE — 25000242 PHARM REV CODE 250 ALT 637 W/ HCPCS: Performed by: INTERNAL MEDICINE

## 2023-10-30 PROCEDURE — 21400001 HC TELEMETRY ROOM

## 2023-10-30 RX ADMIN — EMPAGLIFLOZIN 10 MG: 10 TABLET, FILM COATED ORAL at 09:10

## 2023-10-30 RX ADMIN — PREDNISONE 40 MG: 20 TABLET ORAL at 09:10

## 2023-10-30 RX ADMIN — SENNOSIDES AND DOCUSATE SODIUM 1 TABLET: 50; 8.6 TABLET ORAL at 08:10

## 2023-10-30 RX ADMIN — FLUTICASONE FUROATE 1 PUFF: 200 POWDER RESPIRATORY (INHALATION) at 07:10

## 2023-10-30 RX ADMIN — BENZONATATE 200 MG: 100 CAPSULE ORAL at 03:10

## 2023-10-30 RX ADMIN — GUAIFENESIN 200 MG: 100 SOLUTION ORAL at 03:10

## 2023-10-30 RX ADMIN — CETIRIZINE HYDROCHLORIDE 10 MG: 10 TABLET, FILM COATED ORAL at 09:10

## 2023-10-30 RX ADMIN — IPRATROPIUM BROMIDE AND ALBUTEROL SULFATE 3 ML: 2.5; .5 SOLUTION RESPIRATORY (INHALATION) at 07:10

## 2023-10-30 RX ADMIN — ATORVASTATIN CALCIUM 40 MG: 20 TABLET, FILM COATED ORAL at 09:10

## 2023-10-30 RX ADMIN — MONTELUKAST 10 MG: 10 TABLET, FILM COATED ORAL at 08:10

## 2023-10-30 RX ADMIN — IPRATROPIUM BROMIDE AND ALBUTEROL SULFATE 3 ML: 2.5; .5 SOLUTION RESPIRATORY (INHALATION) at 11:10

## 2023-10-30 RX ADMIN — IPRATROPIUM BROMIDE AND ALBUTEROL SULFATE 3 ML: 2.5; .5 SOLUTION RESPIRATORY (INHALATION) at 03:10

## 2023-10-30 RX ADMIN — FLUTICASONE PROPIONATE 100 MCG: 50 SPRAY, METERED NASAL at 09:10

## 2023-10-30 RX ADMIN — FUROSEMIDE 40 MG: 40 TABLET ORAL at 09:10

## 2023-10-30 RX ADMIN — SACUBITRIL AND VALSARTAN 1 TABLET: 24; 26 TABLET, FILM COATED ORAL at 08:10

## 2023-10-30 RX ADMIN — GUAIFENESIN 200 MG: 100 SOLUTION ORAL at 02:10

## 2023-10-30 RX ADMIN — IPRATROPIUM BROMIDE AND ALBUTEROL SULFATE 3 ML: 2.5; .5 SOLUTION RESPIRATORY (INHALATION) at 12:10

## 2023-10-30 RX ADMIN — SENNOSIDES AND DOCUSATE SODIUM 1 TABLET: 50; 8.6 TABLET ORAL at 09:10

## 2023-10-30 RX ADMIN — IPRATROPIUM BROMIDE AND ALBUTEROL SULFATE 3 ML: 2.5; .5 SOLUTION RESPIRATORY (INHALATION) at 02:10

## 2023-10-30 RX ADMIN — GUAIFENESIN 200 MG: 100 SOLUTION ORAL at 09:10

## 2023-10-30 RX ADMIN — GUAIFENESIN 200 MG: 100 SOLUTION ORAL at 08:10

## 2023-10-30 RX ADMIN — BENZONATATE 200 MG: 100 CAPSULE ORAL at 11:10

## 2023-10-30 RX ADMIN — BENZONATATE 200 MG: 100 CAPSULE ORAL at 09:10

## 2023-10-30 RX ADMIN — WARFARIN SODIUM 5 MG: 5 TABLET ORAL at 04:10

## 2023-10-30 RX ADMIN — SACUBITRIL AND VALSARTAN 1 TABLET: 24; 26 TABLET, FILM COATED ORAL at 09:10

## 2023-10-30 RX ADMIN — AZITHROMYCIN MONOHYDRATE 250 MG: 250 TABLET ORAL at 09:10

## 2023-10-30 NOTE — PLAN OF CARE
Patient resting well  On room air Aerosol given with no adverse reaction Meter dose inhaler given, no adverse reaction noted at this time

## 2023-10-30 NOTE — ASSESSMENT & PLAN NOTE
Patient presented with complaints of SOB that began the day PTA.  Patient states she continues to smoke 1 pack every 2-3 days.  She states her HFA and Nebulizer did not help, so she came to the ED for evaluation.  CXR in ED with the lung parenchyma stable in appearance.  There is no pneumothorax or pleural effusion. She was treated in ED with IV steroids and Duonebs with mild improvement in SOB.  She is improving, but still with decreased air movement and wheezing, and now worsening cough.  Pox low-normal on RA.  Still with POWELL.  Needs another day of treatment here.    Plan:  Repeat CXR today  Continue with Dunnebs q4  Continue Steroids with Prednisone 40mg q day - day #4/5 of steroids  Continue Azithromycin - day #3/5  Continue LABA/ICS  Continue with antitussives prn  O2NC as needed to keep Pox 88-92%

## 2023-10-30 NOTE — ASSESSMENT & PLAN NOTE
Body mass index is 48.08 kg/m². Morbid obesity complicates all aspects of disease management from diagnostic modalities to treatment. Weight loss encouraged and health benefits explained to patient.

## 2023-10-30 NOTE — SUBJECTIVE & OBJECTIVE
Interval History: Patient is awake and alert this morning.  No adverse events reported overnight.  Feeling better from admission, but still with SOB and worsening cough.  Has audible wheezing.  No chest pain.  No Fever/Chills.  Still needs continued treatment.    Review of Systems   Constitutional:  Positive for activity change (decreased). Negative for chills and fever.   HENT:  Negative for ear pain.    Eyes:  Negative for pain.   Respiratory:  Positive for cough (now productive), shortness of breath (mildly improved) and wheezing. Negative for stridor.    Cardiovascular:  Negative for chest pain, palpitations and leg swelling.   Gastrointestinal:  Negative for abdominal distention, abdominal pain, diarrhea, nausea and vomiting.   Genitourinary:  Negative for difficulty urinating and dysuria.   Musculoskeletal:  Negative for neck pain.   Skin:  Negative for wound.   Neurological:  Negative for dizziness and headaches.   Psychiatric/Behavioral:  Negative for agitation, behavioral problems and confusion.      Objective:     Vital Signs (Most Recent):  Temp: 99.1 °F (37.3 °C) (10/30/23 0412)  Pulse: 93 (10/30/23 0807)  Resp: 20 (10/30/23 0807)  BP: (!) 122/58 (10/30/23 0807)  SpO2: (!) 91 % (10/30/23 0807) Vital Signs (24h Range):  Temp:  [98.6 °F (37 °C)-99.1 °F (37.3 °C)] 99.1 °F (37.3 °C)  Pulse:  [81-93] 93  Resp:  [18-20] 20  SpO2:  [90 %-95 %] 91 %  BP: (102-130)/(51-86) 122/58     Weight: (!) 139.3 kg (307 lb)  Body mass index is 48.08 kg/m².    Intake/Output Summary (Last 24 hours) at 10/30/2023 0950  Last data filed at 10/30/2023 0555  Gross per 24 hour   Intake 1080 ml   Output --   Net 1080 ml           Physical Exam  Constitutional:       General: She is not in acute distress.     Appearance: Normal appearance. She is obese. She is ill-appearing (mildly). She is not toxic-appearing.   HENT:      Head: Normocephalic and atraumatic.      Right Ear: External ear normal.      Left Ear: External ear normal.       Nose: Nose normal.      Mouth/Throat:      Mouth: Mucous membranes are moist.      Pharynx: Oropharynx is clear.   Eyes:      General: No scleral icterus.     Pupils: Pupils are equal, round, and reactive to light.   Neck:      Comments: No JVD  Cardiovascular:      Rate and Rhythm: Normal rate and regular rhythm.      Pulses: Normal pulses.      Heart sounds: Normal heart sounds.   Pulmonary:      Effort: Pulmonary effort is normal. No respiratory distress.      Breath sounds: No stridor. Wheezing present. No rales.      Comments: Decreased air movement  Abdominal:      General: Abdomen is flat. Bowel sounds are normal. There is no distension.      Tenderness: There is no abdominal tenderness.   Musculoskeletal:         General: Normal range of motion.      Cervical back: Normal range of motion and neck supple. No rigidity.      Right lower leg: No edema.      Left lower leg: No edema.   Skin:     General: Skin is warm and dry.      Findings: No rash.   Neurological:      General: No focal deficit present.      Mental Status: She is alert. Mental status is at baseline.      Cranial Nerves: No cranial nerve deficit.      Motor: No weakness.   Psychiatric:         Mood and Affect: Mood normal.         Behavior: Behavior normal.             Significant Labs: All pertinent labs within the past 24 hours have been reviewed.    Significant Imaging: I have reviewed all pertinent imaging results/findings within the past 24 hours.

## 2023-10-30 NOTE — ASSESSMENT & PLAN NOTE
INR on admission was 2.5 and stable today at 2.5.  Target INR is 2.5-3.5  -Continue Warfarin  -Follow daily INR

## 2023-10-30 NOTE — PLAN OF CARE
Pt remained free of falls and injuries throughout shift. AAOx4. Pt calm and cooperative. Purposeful hourly rounding performed. Pt swallows meds whole. IV flushed and saline locked. Pt has productive cough, PRN guaifenesin given. Patient ambulates independently. VSS on room air. Pt watching tv in bed, denies pain, denies needs at this time. Bed low and locked, call light in reach. Side rails up x2. Will continue to monitor.

## 2023-10-30 NOTE — PROGRESS NOTES
[facility] Medicine  Progress Note    Patient Name: Delaney Scott  MRN: [id number]  Patient Class: IP- Inpatient   Admission Date: 10/26/2023  Length of Stay: 1 days  Attending Physician: Prem Bernard MD  Primary Care Provider: [facility]        Subjective:     Principal Problem:COPD exacerbation        HPI:  Patient is a 53 year old female with a PMH of COPD/Asthma, HTN, T2D, CHF, MVR on AC, S/p RA-Hystrectomy/BSO/Lysis of Adhesions and Cystoscopy in 10/2023 who presented with complaints of SOB that began the day PTA.  Patient states she continues to smoke 1 pack every 2-3 days.  She states her HFA and Nebulizer did not help, so she came to the ED for evaluation.  CXR in ED with the lung parenchyma stable in appearance.  There is no pneumothorax or pleural effusion. She was treated with IV steroids and Duonebs with mild improvement in SOB.  No f/c, n/v/d.  Has a mostly nonproductive cough.  No chest pain.        Overview/Hospital Course:  No notes on file    Interval History: Patient is awake and alert this morning.  No adverse events reported overnight.  Feeling better from admission, but still with SOB and worsening cough.  Has audible wheezing.  No chest pain.  No Fever/Chills.  Still needs continued treatment.    Review of Systems   Constitutional:  Positive for activity change (decreased). Negative for chills and fever.   HENT:  Negative for ear pain.    Eyes:  Negative for pain.   Respiratory:  Positive for cough (now productive), shortness of breath (mildly improved) and wheezing. Negative for stridor.    Cardiovascular:  Negative for chest pain, palpitations and leg swelling.   Gastrointestinal:  Negative for abdominal distention, abdominal pain, diarrhea, nausea and vomiting.   Genitourinary:  Negative for difficulty urinating and dysuria.   Musculoskeletal:  Negative for neck pain.   Skin:  Negative for wound.   Neurological:  Negative for dizziness and  headaches.   Psychiatric/Behavioral:  Negative for agitation, behavioral problems and confusion.      Objective:     Vital Signs (Most Recent):  Temp: 99.1 °F (37.3 °C) (10/30/23 0412)  Pulse: 93 (10/30/23 0807)  Resp: 20 (10/30/23 0807)  BP: (!) 122/58 (10/30/23 0807)  SpO2: (!) 91 % (10/30/23 0807) Vital Signs (24h Range):  Temp:  [98.6 °F (37 °C)-99.1 °F (37.3 °C)] 99.1 °F (37.3 °C)  Pulse:  [81-93] 93  Resp:  [18-20] 20  SpO2:  [90 %-95 %] 91 %  BP: (102-130)/(51-86) 122/58     Weight: (!) 139.3 kg (307 lb)  Body mass index is 48.08 kg/m².    Intake/Output Summary (Last 24 hours) at 10/30/2023 0950  Last data filed at 10/30/2023 0555  Gross per 24 hour   Intake 1080 ml   Output --   Net 1080 ml           Physical Exam  Constitutional:       General: She is not in acute distress.     Appearance: Normal appearance. She is obese. She is ill-appearing (mildly). She is not toxic-appearing.   HENT:      Head: Normocephalic and atraumatic.      Right Ear: External ear normal.      Left Ear: External ear normal.      Nose: Nose normal.      Mouth/Throat:      Mouth: Mucous membranes are moist.      Pharynx: Oropharynx is clear.   Eyes:      General: No scleral icterus.     Pupils: Pupils are equal, round, and reactive to light.   Neck:      Comments: No JVD  Cardiovascular:      Rate and Rhythm: Normal rate and regular rhythm.      Pulses: Normal pulses.      Heart sounds: Normal heart sounds.   Pulmonary:      Effort: Pulmonary effort is normal. No respiratory distress.      Breath sounds: No stridor. Wheezing present. No rales.      Comments: Decreased air movement  Abdominal:      General: Abdomen is flat. Bowel sounds are normal. There is no distension.      Tenderness: There is no abdominal tenderness.   Musculoskeletal:         General: Normal range of motion.      Cervical back: Normal range of motion and neck supple. No rigidity.      Right lower leg: No edema.      Left lower leg: No edema.   Skin:     General:  Skin is warm and dry.      Findings: No rash.   Neurological:      General: No focal deficit present.      Mental Status: She is alert. Mental status is at baseline.      Cranial Nerves: No cranial nerve deficit.      Motor: No weakness.   Psychiatric:         Mood and Affect: Mood normal.         Behavior: Behavior normal.             Significant Labs: All pertinent labs within the past 24 hours have been reviewed.    Significant Imaging: I have reviewed all pertinent imaging results/findings within the past 24 hours.      Assessment/Plan:      * COPD exacerbation  Patient presented with complaints of SOB that began the day PTA.  Patient states she continues to smoke 1 pack every 2-3 days.  She states her HFA and Nebulizer did not help, so she came to the ED for evaluation.  CXR in ED with the lung parenchyma stable in appearance.  There is no pneumothorax or pleural effusion. She was treated in ED with IV steroids and Duonebs with mild improvement in SOB.  She is improving, but still with decreased air movement and wheezing, and now worsening cough.  Pox low-normal on RA.  Still with POWELL.  Needs another day of treatment here.    Plan:  Repeat CXR today  Continue with Dunnebs q4  Continue Steroids with Prednisone 40mg q day - day #4/5 of steroids  Continue Azithromycin - day #3/5  Continue LABA/ICS  Continue with antitussives prn  O2NC as needed to keep Pox 88-92%        Type 2 diabetes mellitus, without long-term current use of insulin  A1C in 9/2023 was 5.6  Home Meds:  Ozempic, Jardiance.  -Hold Ozempic and continue Jardiance  -Diabetic Diet  -Low correction dose SSI  -Monitor and adjust now that she is on steroids      Chronic systolic heart failure  No evidence of volume overload  TTE in 9/2023 with EF 50-55%, LVDF indeterminate, prosthetic MV  -Continue Jardiance and Entresto  -Contiune Lasix  -Monitor fluid status        Hyperlipidemia  -Continue Statin      Sleep apnea  -Continue CPAP qHS      Severe  obesity (BMI >= 40)  Body mass index is 48.08 kg/m². Morbid obesity complicates all aspects of disease management from diagnostic modalities to treatment. Weight loss encouraged and health benefits explained to patient.         S/P MVR (mitral valve replacement)  INR on admission was 2.5 and stable today at 2.5.  Target INR is 2.5-3.5  -Continue Warfarin  -Follow daily INR      Essential hypertension  SBP stable  -See above        VTE Risk Mitigation (From admission, onward)         Ordered     warfarin (COUMADIN) tablet 2.5 mg  Every Tues, Thurs, Sat, Sun         10/27/23 0728     warfarin (COUMADIN) tablet 5 mg  Every Mon, Wed, Fri         10/27/23 0728     IP VTE HIGH RISK PATIENT  Once         10/27/23 0728     Place sequential compression device  Until discontinued         10/27/23 0728                Discharge Planning   LINO:      Code Status: Full Code   Is the patient medically ready for discharge?:     Reason for patient still in hospital (select all that apply): Patient trending condition, Treatment and Consult recommendations                     Prem Bernard MD  Department of Hospital Medicine   [facility] ([place])

## 2023-10-30 NOTE — ASSESSMENT & PLAN NOTE
No evidence of volume overload  TTE in 9/2023 with EF 50-55%, LVDF indeterminate, prosthetic MV  -Continue Jardiance and Entresto  -Contiune Lasix  -Monitor fluid status

## 2023-10-31 VITALS
DIASTOLIC BLOOD PRESSURE: 75 MMHG | OXYGEN SATURATION: 96 % | HEIGHT: 67 IN | HEART RATE: 96 BPM | TEMPERATURE: 99 F | RESPIRATION RATE: 16 BRPM | SYSTOLIC BLOOD PRESSURE: 125 MMHG | BODY MASS INDEX: 45.99 KG/M2 | WEIGHT: 293 LBS

## 2023-10-31 LAB
ANION GAP SERPL CALC-SCNC: 9 MMOL/L (ref 8–16)
BASOPHILS # BLD AUTO: 0.03 K/UL (ref 0–0.2)
BASOPHILS NFR BLD: 0.6 % (ref 0–1.9)
BUN SERPL-MCNC: 19 MG/DL (ref 6–20)
CALCIUM SERPL-MCNC: 9.5 MG/DL (ref 8.7–10.5)
CHLORIDE SERPL-SCNC: 102 MMOL/L (ref 95–110)
CO2 SERPL-SCNC: 31 MMOL/L (ref 23–29)
CREAT SERPL-MCNC: 1.1 MG/DL (ref 0.5–1.4)
DIFFERENTIAL METHOD: ABNORMAL
EOSINOPHIL # BLD AUTO: 0.1 K/UL (ref 0–0.5)
EOSINOPHIL NFR BLD: 1.5 % (ref 0–8)
ERYTHROCYTE [DISTWIDTH] IN BLOOD BY AUTOMATED COUNT: 16.3 % (ref 11.5–14.5)
EST. GFR  (NO RACE VARIABLE): >60 ML/MIN/1.73 M^2
GLUCOSE SERPL-MCNC: 89 MG/DL (ref 70–110)
HCT VFR BLD AUTO: 35.3 % (ref 37–48.5)
HGB BLD-MCNC: 10.3 G/DL (ref 12–16)
IMM GRANULOCYTES # BLD AUTO: 0.02 K/UL (ref 0–0.04)
IMM GRANULOCYTES NFR BLD AUTO: 0.4 % (ref 0–0.5)
INR PPP: 2.5 (ref 0.8–1.2)
LYMPHOCYTES # BLD AUTO: 1.3 K/UL (ref 1–4.8)
LYMPHOCYTES NFR BLD: 23.5 % (ref 18–48)
MAGNESIUM SERPL-MCNC: 1.9 MG/DL (ref 1.6–2.6)
MCH RBC QN AUTO: 26.6 PG (ref 27–31)
MCHC RBC AUTO-ENTMCNC: 29.2 G/DL (ref 32–36)
MCV RBC AUTO: 91 FL (ref 82–98)
MONOCYTES # BLD AUTO: 0.7 K/UL (ref 0.3–1)
MONOCYTES NFR BLD: 13.2 % (ref 4–15)
NEUTROPHILS # BLD AUTO: 3.3 K/UL (ref 1.8–7.7)
NEUTROPHILS NFR BLD: 60.8 % (ref 38–73)
NRBC BLD-RTO: 0 /100 WBC
PHOSPHATE SERPL-MCNC: 4.2 MG/DL (ref 2.7–4.5)
PLATELET # BLD AUTO: 262 K/UL (ref 150–450)
PMV BLD AUTO: 10 FL (ref 9.2–12.9)
POTASSIUM SERPL-SCNC: 3.5 MMOL/L (ref 3.5–5.1)
PROTHROMBIN TIME: 26 SEC (ref 9–12.5)
RBC # BLD AUTO: 3.87 M/UL (ref 4–5.4)
SODIUM SERPL-SCNC: 142 MMOL/L (ref 136–145)
WBC # BLD AUTO: 5.44 K/UL (ref 3.9–12.7)

## 2023-10-31 PROCEDURE — 94640 AIRWAY INHALATION TREATMENT: CPT

## 2023-10-31 PROCEDURE — 84100 ASSAY OF PHOSPHORUS: CPT | Performed by: HOSPITALIST

## 2023-10-31 PROCEDURE — 94761 N-INVAS EAR/PLS OXIMETRY MLT: CPT

## 2023-10-31 PROCEDURE — 85610 PROTHROMBIN TIME: CPT | Performed by: INTERNAL MEDICINE

## 2023-10-31 PROCEDURE — 63700000 PHARM REV CODE 250 ALT 637 W/O HCPCS: Performed by: INTERNAL MEDICINE

## 2023-10-31 PROCEDURE — 85025 COMPLETE CBC W/AUTO DIFF WBC: CPT | Performed by: HOSPITALIST

## 2023-10-31 PROCEDURE — 25000242 PHARM REV CODE 250 ALT 637 W/ HCPCS: Performed by: INTERNAL MEDICINE

## 2023-10-31 PROCEDURE — 80048 BASIC METABOLIC PNL TOTAL CA: CPT | Performed by: HOSPITALIST

## 2023-10-31 PROCEDURE — 83735 ASSAY OF MAGNESIUM: CPT | Performed by: HOSPITALIST

## 2023-10-31 PROCEDURE — 63600175 PHARM REV CODE 636 W HCPCS: Performed by: INTERNAL MEDICINE

## 2023-10-31 PROCEDURE — 25000003 PHARM REV CODE 250: Performed by: INTERNAL MEDICINE

## 2023-10-31 PROCEDURE — 36415 COLL VENOUS BLD VENIPUNCTURE: CPT | Performed by: INTERNAL MEDICINE

## 2023-10-31 RX ORDER — ALBUTEROL SULFATE 90 UG/1
2 AEROSOL, METERED RESPIRATORY (INHALATION) EVERY 6 HOURS PRN
Qty: 18 G | Refills: 0 | OUTPATIENT
Start: 2023-10-31 | End: 2024-02-14

## 2023-10-31 RX ORDER — FUROSEMIDE 40 MG/1
40 TABLET ORAL DAILY
Qty: 30 TABLET | Refills: 0 | Status: SHIPPED | OUTPATIENT
Start: 2023-10-31

## 2023-10-31 RX ORDER — EMPAGLIFLOZIN 10 MG/1
10 TABLET, FILM COATED ORAL DAILY
Qty: 30 TABLET | Refills: 0 | Status: SHIPPED | OUTPATIENT
Start: 2023-10-31

## 2023-10-31 RX ORDER — AZITHROMYCIN 250 MG/1
250 TABLET, FILM COATED ORAL DAILY
Qty: 1 TABLET | Refills: 0 | Status: SHIPPED | OUTPATIENT
Start: 2023-11-01 | End: 2023-11-02

## 2023-10-31 RX ORDER — MONTELUKAST SODIUM 10 MG/1
10 TABLET ORAL NIGHTLY
Qty: 30 TABLET | Refills: 0 | Status: SHIPPED | OUTPATIENT
Start: 2023-10-31

## 2023-10-31 RX ORDER — ATORVASTATIN CALCIUM 40 MG/1
40 TABLET, FILM COATED ORAL DAILY
Qty: 30 TABLET | Refills: 0 | Status: SHIPPED | OUTPATIENT
Start: 2023-10-31

## 2023-10-31 RX ORDER — BENZONATATE 200 MG/1
200 CAPSULE ORAL 3 TIMES DAILY PRN
Qty: 20 CAPSULE | Refills: 0 | Status: SHIPPED | OUTPATIENT
Start: 2023-10-31 | End: 2023-11-10

## 2023-10-31 RX ORDER — PREDNISONE 20 MG/1
40 TABLET ORAL DAILY
Qty: 2 TABLET | Refills: 0 | Status: SHIPPED | OUTPATIENT
Start: 2023-11-01 | End: 2023-11-02

## 2023-10-31 RX ORDER — WARFARIN 2.5 MG/1
2.5 TABLET ORAL
Qty: 16 TABLET | Refills: 0
Start: 2023-10-31

## 2023-10-31 RX ORDER — GUAIFENESIN 100 MG/5ML
200 SOLUTION ORAL EVERY 4 HOURS PRN
Qty: 236 ML | Refills: 0 | Status: SHIPPED | OUTPATIENT
Start: 2023-10-31 | End: 2023-11-10

## 2023-10-31 RX ORDER — WARFARIN SODIUM 5 MG/1
5 TABLET ORAL
Qty: 12 TABLET | Refills: 11
Start: 2023-11-01

## 2023-10-31 RX ORDER — FLUTICASONE FUROATE AND VILANTEROL 200; 25 UG/1; UG/1
1 POWDER RESPIRATORY (INHALATION) DAILY
Qty: 60 EACH | Refills: 0 | Status: SHIPPED | OUTPATIENT
Start: 2023-10-31

## 2023-10-31 RX ORDER — CETIRIZINE HYDROCHLORIDE 10 MG/1
10 TABLET ORAL DAILY
Qty: 30 TABLET | Refills: 0 | Status: SHIPPED | OUTPATIENT
Start: 2023-11-01

## 2023-10-31 RX ORDER — SACUBITRIL AND VALSARTAN 24; 26 MG/1; MG/1
1 TABLET, FILM COATED ORAL 2 TIMES DAILY
Qty: 60 TABLET | Refills: 0 | Status: SHIPPED | OUTPATIENT
Start: 2023-10-31

## 2023-10-31 RX ORDER — IPRATROPIUM BROMIDE AND ALBUTEROL SULFATE 2.5; .5 MG/3ML; MG/3ML
3 SOLUTION RESPIRATORY (INHALATION)
Qty: 270 ML | Refills: 0 | OUTPATIENT
Start: 2023-10-31 | End: 2024-02-14

## 2023-10-31 RX ADMIN — SACUBITRIL AND VALSARTAN 1 TABLET: 24; 26 TABLET, FILM COATED ORAL at 09:10

## 2023-10-31 RX ADMIN — IPRATROPIUM BROMIDE AND ALBUTEROL SULFATE 3 ML: 2.5; .5 SOLUTION RESPIRATORY (INHALATION) at 03:10

## 2023-10-31 RX ADMIN — FLUTICASONE PROPIONATE 100 MCG: 50 SPRAY, METERED NASAL at 09:10

## 2023-10-31 RX ADMIN — FLUTICASONE FUROATE 1 PUFF: 200 POWDER RESPIRATORY (INHALATION) at 07:10

## 2023-10-31 RX ADMIN — EMPAGLIFLOZIN 10 MG: 10 TABLET, FILM COATED ORAL at 09:10

## 2023-10-31 RX ADMIN — CETIRIZINE HYDROCHLORIDE 10 MG: 10 TABLET, FILM COATED ORAL at 09:10

## 2023-10-31 RX ADMIN — IPRATROPIUM BROMIDE AND ALBUTEROL SULFATE 3 ML: 2.5; .5 SOLUTION RESPIRATORY (INHALATION) at 12:10

## 2023-10-31 RX ADMIN — FUROSEMIDE 40 MG: 40 TABLET ORAL at 09:10

## 2023-10-31 RX ADMIN — SENNOSIDES AND DOCUSATE SODIUM 1 TABLET: 50; 8.6 TABLET ORAL at 09:10

## 2023-10-31 RX ADMIN — ATORVASTATIN CALCIUM 40 MG: 20 TABLET, FILM COATED ORAL at 09:10

## 2023-10-31 RX ADMIN — IPRATROPIUM BROMIDE AND ALBUTEROL SULFATE 3 ML: 2.5; .5 SOLUTION RESPIRATORY (INHALATION) at 07:10

## 2023-10-31 RX ADMIN — PREDNISONE 40 MG: 20 TABLET ORAL at 09:10

## 2023-10-31 RX ADMIN — GUAIFENESIN 200 MG: 100 SOLUTION ORAL at 09:10

## 2023-10-31 RX ADMIN — BENZONATATE 200 MG: 100 CAPSULE ORAL at 09:10

## 2023-10-31 RX ADMIN — AZITHROMYCIN MONOHYDRATE 250 MG: 250 TABLET ORAL at 09:10

## 2023-10-31 NOTE — CARE UPDATE
10/31/23 0756   Patient Assessment/Suction   Level of Consciousness (AVPU) alert   Respiratory Effort Normal;Unlabored   Expansion/Accessory Muscles/Retractions no retractions;no use of accessory muscles   All Lung Fields Breath Sounds diminished;wheezes, expiratory   PRE-TX-O2   Device (Oxygen Therapy) room air   SpO2 (!) 92 %   Pulse Oximetry Type Intermittent   $ Pulse Oximetry - Multiple Charge Pulse Oximetry - Multiple   Pulse 93   Resp 18   Aerosol Therapy   $ Aerosol Therapy Charges Aerosol Treatment   Daily Review of Necessity (SVN) completed   Respiratory Treatment Status (SVN) given   Treatment Route (SVN) oxygen;mask   Patient Position (SVN) Mcelroy's   Post Treatment Assessment (SVN) breath sounds unchanged   Signs of Intolerance (SVN) none   Inhaler   $ Inhaler Charges MDI (Metered Dose Inahler) Treatment;Mouth rinsed post treatment   Daily Review of Necessity (Inhaler) completed   Respiratory Treatment Status (Inhaler) given   Treatment Route (Inhaler) mouthpiece   Patient Position (Inhaler) Mcelroy's   Post Treatment Assessment (Inhaler) breath sounds unchanged   Signs of Intolerance (Inhaler) none

## 2023-10-31 NOTE — PLAN OF CARE
Problem: Adult Inpatient Plan of Care  Goal: Plan of Care Review  Outcome: Met  Goal: Patient-Specific Goal (Individualized)  Outcome: Met  Goal: Absence of Hospital-Acquired Illness or Injury  Outcome: Met  Goal: Optimal Comfort and Wellbeing  Outcome: Met  Goal: Readiness for Transition of Care  Outcome: Met     Problem: Bariatric Environmental Safety  Goal: Safety Maintained with Care  Outcome: Met     Problem: Adjustment to Illness COPD (Chronic Obstructive Pulmonary Disease)  Goal: Optimal Chronic Illness Coping  Outcome: Met     Problem: Functional Ability Impaired COPD (Chronic Obstructive Pulmonary Disease)  Goal: Optimal Level of Functional Davison  Outcome: Met     Problem: Infection COPD (Chronic Obstructive Pulmonary Disease)  Goal: Absence of Infection Signs and Symptoms  Outcome: Met     Problem: Oral Intake Inadequate COPD (Chronic Obstructive Pulmonary Disease)  Goal: Improved Nutrition Intake  Outcome: Met     Problem: Respiratory Compromise COPD (Chronic Obstructive Pulmonary Disease)  Goal: Effective Oxygenation and Ventilation  Outcome: Met     Problem: Diabetes Comorbidity  Goal: Blood Glucose Level Within Targeted Range  Outcome: Met       Reviewed plan of care with emphasis on discharge process.  Patient verbalized understanding and agreement with plan of care.

## 2023-10-31 NOTE — PLAN OF CARE
Cm met with pt for final discharge planning assessment. Pt is ready for discharge home today.    Meds sent to [facility] for bedside delivery.    Pt states she will schedule her own follow-up apts with [facility].    Family to provide transportation home.    Referral for Pulmonary rehab added to pt's discharge folder and CM faxed referral to [facility], [phone].    Pt is ready for discharge from CM perspective.   10/31/23 1208   Final Note   Assessment Type Final Discharge Note   Anticipated Discharge Disposition Home   What phone number can be called within the next 1-3 days to see how you are doing after discharge? [phone]   Hospital Resources/Appts/Education Provided Provided patient/caregiver with written discharge plan information;Provided education on problems/symptoms using teachback  (Pt will schedule her own follof-up apts)   Post-Acute Status   Discharge Delays None known at this time     [facility] ([place])  Discharge Final Note    Primary Care Provider: [facility]    Expected Discharge Date: 10/31/2023    Final Discharge Note (most recent)       Final Note - 10/31/23 1208          Final Note    Assessment Type Final Discharge Note (P)      Anticipated Discharge Disposition Home or Self Care (P)      What phone number can be called within the next 1-3 days to see how you are doing after discharge? [phone] (P)      Hospital Resources/Appts/Education Provided Provided patient/caregiver with written discharge plan information;Provided education on problems/symptoms using teachback (P)    Pt will schedule her own follof-up apts       Post-Acute Status    Discharge Delays None known at this time (P)                      Important Message from Medicare             Contact Info       [facility]   Specialty: Pain Medicine, Neurology   Relationship: PCP - General    [address] [place] [address]   Phone: [phone]       Next  Steps: Schedule an appointment as soon as possible for a visit in 1 week(s)

## 2023-10-31 NOTE — NURSING
Patient is medically cleared for discharge.  AOX4, room air, ambulates independently.  Tele equipment and all peripheral lines have been removed.  AVS reviewed with patient  Transport declined.  Patient was escorted off unit in a wheel chair by her daughter .

## 2023-11-01 NOTE — DISCHARGE SUMMARY
"[facility] Medicine  Discharge Summary      Patient Name: Delaney Scott  MRN: [id number]  ABRAHAM: [id number]  Patient Class: IP- Inpatient  Admission Date: 10/26/2023  Hospital Length of Stay: 2 days  Discharge Date and Time: 10/31/2023  3:20 PM  Attending Physician: Duy Moy MD  Discharging Provider: Duy Moy MD      HPI:   Per Dr. Amaro. LAQUITA Bernard:    "Patient is a 53 year old female with a PMH of COPD/Asthma, HTN, T2D, CHF, MVR on AC, S/p RA-Hystrectomy/BSO/Lysis of Adhesions and Cystoscopy in 10/2023 who presented with complaints of SOB that began the day PTA.  Patient states she continues to smoke 1 pack every 2-3 days.  She states her HFA and Nebulizer did not help, so she came to the ED for evaluation.  CXR in ED with the lung parenchyma stable in appearance.  There is no pneumothorax or pleural effusion. She was treated with IV steroids and Duonebs with mild improvement in SOB.  No f/c, n/v/d.  Has a mostly nonproductive cough.  No chest pain."      Hospital Course:   Patient is a 53-year-old woman with a history of asthma and chronic obstructive pulmonary disease, tobacco smoker (who reported recently quit), hypertension, diabetes mellitus type 2, chronic systolic heart failure, mitral valve replacement on warfarin, and severe obesity admitted to the hospital with chronic obstructive pulmonary disease exacerbation.  Patient treated with steroids, frequent bronchodilator breathing treatments, controller respiratory inhalers, and antibiotic therapy with azithromycin.  In terms of her heart failure her volume status appears to be well compensated this time.  Respiratory status in terms of air movement and work of breathing both markedly improved and now stable to be discharged home to complete course of antibiotics and steroids.  Reiterated the importance of continuation of abstaining from further tobacco use.  Patient stable to discharge home with close outpatient " follow-up advised.       Goals of Care Treatment Preferences:  Code Status: Full Code      Final Active Diagnoses:    Diagnosis Date Noted POA    PRINCIPAL PROBLEM:  COPD exacerbation [J44.1] 11/03/2021 Yes    Hyperlipidemia [E78.5] 05/05/2023 Yes    Essential hypertension [I10] 02/27/2020 Yes    S/P MVR (mitral valve replacement) [Z95.2] 02/27/2020 Not Applicable    Type 2 diabetes mellitus, without long-term current use of insulin [E11.9] 02/27/2020 Yes    Severe obesity (BMI >= 40) [E66.01] 02/27/2020 Yes    Sleep apnea [G47.30] 12/19/2014 Yes    Chronic systolic heart failure [I50.22] 12/19/2014 Yes      Problems Resolved During this Admission:       Discharged Condition: Stable    Disposition: Home or Self Care    Follow Up:   Follow-up Information     [facility]. Schedule an appointment as soon as possible for a visit in 1 week(s).    Specialties: Pain Medicine, Neurology  Contact information:  [address] [place], [place] [id number] [phone]                       Patient Instructions:      Ambulatory referral/consult to Pulmonology   Standing Status: Future   Referral Priority: Urgent Referral Type: Consultation   Referral Reason: Specialty Services Required   Requested Specialty: Pulmonary Disease   Number of Visits Requested: 1     Ambulatory referral/consult to Pulmonary Rehab   Standing Status: Future   Referral Priority: Routine Referral Type: Consultation   Referral Reason: Specialty Services Required   Requested Specialty: Pulmonary Disease   Number of Visits Requested: 1        Medications:  Reconciled Home Medications:      Medication List      START taking these medications    azithromycin 250 MG tablet  Commonly known as: Z-BETTY  Take 1 tablet (250 mg total) by mouth once daily. for 1 day     benzonatate 200 MG capsule  Commonly known as: TESSALON  Take 1 capsule (200 mg total) by mouth 3 (three) times daily as needed for Cough (dry cough).     BREO ELLIPTA 200-25 mcg/dose  Dsdv diskus inhaler  Generic drug: fluticasone furoate-vilanteroL  Inhale 1 puff into the lungs once daily. Controller     cetirizine 10 MG tablet  Commonly known as: ZYRTEC  Take 1 tablet (10 mg total) by mouth once daily.  Replaces: loratadine 10 mg tablet     guaiFENesin 100 mg/5 ml 100 mg/5 mL syrup  Commonly known as: ROBITUSSIN  Take 10 mLs (200 mg total) by mouth every 4 (four) hours as needed for Congestion or Cough (productive cough).     predniSONE 20 MG tablet  Commonly known as: DELTASONE  Take 2 tablets (40 mg total) by mouth once daily. for 1 day        CHANGE how you take these medications    albuterol 90 mcg/actuation inhaler  Commonly known as: PROVENTIL/VENTOLIN HFA  Inhale 2 puffs into the lungs every 6 (six) hours as needed for Wheezing or Shortness of Breath.  What changed: Another medication with the same name was removed. Continue taking this medication, and follow the directions you see here.     albuterol-ipratropium 2.5 mg-0.5 mg/3 mL nebulizer solution  Commonly known as: DUO-NEB  Take 3 mLs by nebulization every 6 (six) hours while awake.  What changed:   · how to take this  · when to take this  · reasons to take this     JARDIANCE 10 mg tablet  Generic drug: empagliflozin  Take 1 tablet (10 mg total) by mouth once daily.  What changed: when to take this     * warfarin 2.5 MG tablet  Commonly known as: COUMADIN  Take 1 tablet (2.5 mg total) by mouth every Tuesday, Thursday, Saturday, Sunday.  What changed: You were already taking a medication with the same name, and this prescription was added. Make sure you understand how and when to take each.     * warfarin 5 MG tablet  Commonly known as: COUMADIN  Take 1 tablet (5 mg total) by mouth every Mon, Wed, Fri.  What changed: when to take this         * This list has 2 medication(s) that are the same as other medications prescribed for you. Read the directions carefully, and ask your doctor or other care provider to review them with you.             CONTINUE taking these medications    atorvastatin 40 MG tablet  Commonly known as: LIPITOR  Take 1 tablet (40 mg total) by mouth once daily.     ENTRESTO 24-26 mg per tablet  Generic drug: sacubitriL-valsartan  Take 1 tablet by mouth 2 (two) times daily.     furosemide 40 MG tablet  Commonly known as: LASIX  Take 1 tablet (40 mg total) by mouth once daily.     montelukast 10 mg tablet  Commonly known as: SINGULAIR  Take 1 tablet (10 mg total) by mouth every evening.     OZEMPIC 0.25 mg or 0.5 mg (2 mg/3 mL) pen injector  Generic drug: semaglutide  Inject 0.5 mg into the skin every 7 days.        STOP taking these medications    carvediloL 12.5 MG tablet  Commonly known as: COREG     FLOVENT  mcg/actuation inhaler  Generic drug: fluticasone propionate     fluticasone propionate 50 mcg/actuation nasal spray  Commonly known as: FLONASE     gabapentin 100 MG capsule  Commonly known as: NEURONTIN     loratadine 10 mg tablet  Commonly known as: CLARITIN  Replaced by: cetirizine 10 MG tablet     TRUEPLUS LANCETS 33 gauge Misc  Generic drug: lancets     umeclidinium-vilanteroL 62.5-25 mcg/actuation Dsdv  Commonly known as: ANORO ELLIPTA            Indwelling Lines/Drains at time of discharge:   Lines/Drains/Airways     None                 Time spent on the discharge of patient: 35 minutes         Duy Moy MD  Department of Hospital Medicine  [facility])

## 2023-11-01 NOTE — HOSPITAL COURSE
Patient is a 53-year-old woman with a history of asthma and chronic obstructive pulmonary disease, tobacco smoker (who reported recently quit), hypertension, diabetes mellitus type 2, chronic systolic heart failure, mitral valve replacement on warfarin, and severe obesity admitted to the hospital with chronic obstructive pulmonary disease exacerbation.  Patient treated with steroids, frequent bronchodilator breathing treatments, controller respiratory inhalers, and antibiotic therapy with azithromycin.  In terms of her heart failure her volume status appears to be well compensated this time.  Respiratory status in terms of air movement and work of breathing both markedly improved and now stable to be discharged home to complete course of antibiotics and steroids.  Reiterated the importance of continuation of abstaining from further tobacco use.  Patient stable to discharge home with close outpatient follow-up advised.

## 2024-02-14 ENCOUNTER — HOSPITAL ENCOUNTER (EMERGENCY)
Facility: OTHER | Age: 54
Discharge: HOME | End: 2024-02-14
Attending: EMERGENCY MEDICINE
Payer: MEDICAID

## 2024-02-14 VITALS
WEIGHT: 293 LBS | BODY MASS INDEX: 45.99 KG/M2 | RESPIRATION RATE: 16 BRPM | HEART RATE: 78 BPM | OXYGEN SATURATION: 98 % | HEIGHT: 67 IN | DIASTOLIC BLOOD PRESSURE: 60 MMHG | SYSTOLIC BLOOD PRESSURE: 117 MMHG | TEMPERATURE: 98 F

## 2024-02-14 DIAGNOSIS — J44.1 COPD WITH ACUTE EXACERBATION: Primary | ICD-10-CM

## 2024-02-14 DIAGNOSIS — R06.00 DYSPNEA: ICD-10-CM

## 2024-02-14 LAB
ALBUMIN SERPL BCP-MCNC: 3.1 G/DL (ref 3.5–5.2)
ALP SERPL-CCNC: 106 U/L (ref 55–135)
ALT SERPL W/O P-5'-P-CCNC: 13 U/L (ref 10–44)
ANION GAP SERPL CALC-SCNC: 7 MMOL/L (ref 8–16)
AST SERPL-CCNC: 16 U/L (ref 10–40)
BASOPHILS # BLD AUTO: 0.03 K/UL (ref 0–0.2)
BASOPHILS NFR BLD: 0.5 % (ref 0–1.9)
BILIRUB SERPL-MCNC: 0.4 MG/DL (ref 0.1–1)
BUN SERPL-MCNC: 15 MG/DL (ref 6–20)
CALCIUM SERPL-MCNC: 9.1 MG/DL (ref 8.7–10.5)
CHLORIDE SERPL-SCNC: 109 MMOL/L (ref 95–110)
CO2 SERPL-SCNC: 25 MMOL/L (ref 23–29)
CREAT SERPL-MCNC: 1 MG/DL (ref 0.5–1.4)
CTP QC/QA: YES
CTP QC/QA: YES
DIFFERENTIAL METHOD BLD: ABNORMAL
EOSINOPHIL # BLD AUTO: 0.3 K/UL (ref 0–0.5)
EOSINOPHIL NFR BLD: 4.9 % (ref 0–8)
ERYTHROCYTE [DISTWIDTH] IN BLOOD BY AUTOMATED COUNT: 17.9 % (ref 11.5–14.5)
EST. GFR  (NO RACE VARIABLE): >60 ML/MIN/1.73 M^2
GLUCOSE SERPL-MCNC: 90 MG/DL (ref 70–110)
HCT VFR BLD AUTO: 36.6 % (ref 37–48.5)
HCV AB SERPL QL IA: NEGATIVE
HGB BLD-MCNC: 10.5 G/DL (ref 12–16)
HIV 1+2 AB+HIV1 P24 AG SERPL QL IA: NEGATIVE
IMM GRANULOCYTES # BLD AUTO: 0.01 K/UL (ref 0–0.04)
IMM GRANULOCYTES NFR BLD AUTO: 0.2 % (ref 0–0.5)
INR PPP: 2.3 (ref 0.8–1.2)
LYMPHOCYTES # BLD AUTO: 1.2 K/UL (ref 1–4.8)
LYMPHOCYTES NFR BLD: 19.8 % (ref 18–48)
MCH RBC QN AUTO: 26.6 PG (ref 27–31)
MCHC RBC AUTO-ENTMCNC: 28.7 G/DL (ref 32–36)
MCV RBC AUTO: 93 FL (ref 82–98)
MONOCYTES # BLD AUTO: 0.4 K/UL (ref 0.3–1)
MONOCYTES NFR BLD: 6.2 % (ref 4–15)
NEUTROPHILS # BLD AUTO: 4.2 K/UL (ref 1.8–7.7)
NEUTROPHILS NFR BLD: 68.4 % (ref 38–73)
NRBC BLD-RTO: 0 /100 WBC
OHS QRS DURATION: 80 MS
OHS QTC CALCULATION: 466 MS
PLATELET # BLD AUTO: 222 K/UL (ref 150–450)
PMV BLD AUTO: 9.8 FL (ref 9.2–12.9)
POC MOLECULAR INFLUENZA A AGN: NEGATIVE
POC MOLECULAR INFLUENZA B AGN: NEGATIVE
POCT GLUCOSE: 80 MG/DL (ref 70–110)
POTASSIUM SERPL-SCNC: 4.5 MMOL/L (ref 3.5–5.1)
PROT SERPL-MCNC: 7 G/DL (ref 6–8.4)
PROTHROMBIN TIME: 23.8 SEC (ref 9–12.5)
RBC # BLD AUTO: 3.95 M/UL (ref 4–5.4)
SARS-COV-2 RDRP RESP QL NAA+PROBE: NEGATIVE
SODIUM SERPL-SCNC: 141 MMOL/L (ref 136–145)
TROPONIN I SERPL DL<=0.01 NG/ML-MCNC: <0.006 NG/ML (ref 0–0.03)
WBC # BLD AUTO: 6.16 K/UL (ref 3.9–12.7)

## 2024-02-14 PROCEDURE — 82962 GLUCOSE BLOOD TEST: CPT

## 2024-02-14 PROCEDURE — 80053 COMPREHEN METABOLIC PANEL: CPT | Performed by: EMERGENCY MEDICINE

## 2024-02-14 PROCEDURE — 94761 N-INVAS EAR/PLS OXIMETRY MLT: CPT

## 2024-02-14 PROCEDURE — 96374 THER/PROPH/DIAG INJ IV PUSH: CPT

## 2024-02-14 PROCEDURE — 84484 ASSAY OF TROPONIN QUANT: CPT | Performed by: EMERGENCY MEDICINE

## 2024-02-14 PROCEDURE — 94640 AIRWAY INHALATION TREATMENT: CPT | Mod: XB

## 2024-02-14 PROCEDURE — 87389 HIV-1 AG W/HIV-1&-2 AB AG IA: CPT | Performed by: EMERGENCY MEDICINE

## 2024-02-14 PROCEDURE — 86803 HEPATITIS C AB TEST: CPT | Performed by: EMERGENCY MEDICINE

## 2024-02-14 PROCEDURE — 99285 EMERGENCY DEPT VISIT HI MDM: CPT | Mod: 25

## 2024-02-14 PROCEDURE — 93010 ELECTROCARDIOGRAM REPORT: CPT | Mod: ,,, | Performed by: INTERNAL MEDICINE

## 2024-02-14 PROCEDURE — 85610 PROTHROMBIN TIME: CPT | Performed by: EMERGENCY MEDICINE

## 2024-02-14 PROCEDURE — 25000242 PHARM REV CODE 250 ALT 637 W/ HCPCS: Performed by: EMERGENCY MEDICINE

## 2024-02-14 PROCEDURE — 87635 SARS-COV-2 COVID-19 AMP PRB: CPT | Performed by: EMERGENCY MEDICINE

## 2024-02-14 PROCEDURE — 25000003 PHARM REV CODE 250: Performed by: EMERGENCY MEDICINE

## 2024-02-14 PROCEDURE — 85025 COMPLETE CBC W/AUTO DIFF WBC: CPT | Performed by: EMERGENCY MEDICINE

## 2024-02-14 PROCEDURE — 63600175 PHARM REV CODE 636 W HCPCS: Performed by: EMERGENCY MEDICINE

## 2024-02-14 PROCEDURE — 93005 ELECTROCARDIOGRAM TRACING: CPT

## 2024-02-14 RX ORDER — IPRATROPIUM BROMIDE AND ALBUTEROL SULFATE 2.5; .5 MG/3ML; MG/3ML
3 SOLUTION RESPIRATORY (INHALATION)
Status: COMPLETED | OUTPATIENT
Start: 2024-02-14 | End: 2024-02-14

## 2024-02-14 RX ORDER — ALBUTEROL SULFATE 90 UG/1
1-2 AEROSOL, METERED RESPIRATORY (INHALATION) EVERY 6 HOURS PRN
Qty: 6.7 G | Refills: 0 | Status: SHIPPED | OUTPATIENT
Start: 2024-02-14 | End: 2024-02-14

## 2024-02-14 RX ORDER — BENZONATATE 100 MG/1
100 CAPSULE ORAL 3 TIMES DAILY PRN
Qty: 20 CAPSULE | Refills: 0 | Status: SHIPPED | OUTPATIENT
Start: 2024-02-14 | End: 2024-02-24

## 2024-02-14 RX ORDER — BENZONATATE 100 MG/1
100 CAPSULE ORAL 3 TIMES DAILY PRN
Qty: 20 CAPSULE | Refills: 0 | Status: SHIPPED | OUTPATIENT
Start: 2024-02-14 | End: 2024-02-14

## 2024-02-14 RX ORDER — ALBUTEROL SULFATE 90 UG/1
1-2 AEROSOL, METERED RESPIRATORY (INHALATION) EVERY 6 HOURS PRN
Qty: 6.7 G | Refills: 0 | Status: SHIPPED | OUTPATIENT
Start: 2024-02-14 | End: 2024-03-15

## 2024-02-14 RX ORDER — DEXAMETHASONE SODIUM PHOSPHATE 4 MG/ML
8 INJECTION, SOLUTION INTRA-ARTICULAR; INTRALESIONAL; INTRAMUSCULAR; INTRAVENOUS; SOFT TISSUE
Status: COMPLETED | OUTPATIENT
Start: 2024-02-14 | End: 2024-02-14

## 2024-02-14 RX ORDER — BENZONATATE 100 MG/1
100 CAPSULE ORAL
Status: COMPLETED | OUTPATIENT
Start: 2024-02-14 | End: 2024-02-14

## 2024-02-14 RX ADMIN — DEXAMETHASONE SODIUM PHOSPHATE 8 MG: 4 INJECTION INTRA-ARTICULAR; INTRALESIONAL; INTRAMUSCULAR; INTRAVENOUS; SOFT TISSUE at 10:02

## 2024-02-14 RX ADMIN — IPRATROPIUM BROMIDE AND ALBUTEROL SULFATE 3 ML: 2.5; .5 SOLUTION RESPIRATORY (INHALATION) at 08:02

## 2024-02-14 RX ADMIN — BENZONATATE 100 MG: 100 CAPSULE ORAL at 09:02

## 2024-02-14 NOTE — ED PROVIDER NOTES
"  Source of History:  Medical record, patient      Chief complaint:  Per triage note: "Shortness of Breath (Pt reports SOB that is constant but worse on exertion x 2 days. No relief from at home inhaler/nebulizer tx. Hx of COPD)  "    HPI:    Patient presents for evaluation of shortness of breath, wheezing, dyspnea on exertion for last 2 days.  No clear inciting factor.  Symptoms not improved with rescue treatment.  She denies any sick contacts.  She denies any associated fevers.  She notes chest tightness typical of COPD exacerbations.  She denies any recent antibiotics.  She notes cough productive of white sputum.    ROS:   See HPI for pertinent review of systems        Review of patient's allergies indicates:  No Known Allergies    PMH:  As per HPI and below:  Past Medical History:   Diagnosis Date    Anticoagulant long-term use     Arthritis     Asthma     CHF (congestive heart failure), NYHA class III 2014    COPD with acute exacerbation     Diabetes mellitus     Hypertension        Past Surgical History:   Procedure Laterality Date    BILATERAL TUBAL LIGATION      laparascopic    CARDIAC SURGERY       SECTION      x2    MITRAL VALVE REPLACEMENT      [employer] mechanical valve    TUBAL LIGATION         Social History     Tobacco Use    Smoking status: Some Days     Current packs/day: 0.50     Types: Cigarettes    Smokeless tobacco: Never   Substance Use Topics    Alcohol use: No    Drug use: No       Physical Exam:      Nursing note and vitals reviewed.  BP (!) 122/57   Pulse 80   Temp 98.2 °F (36.8 °C) (Oral)   Resp 20   Ht 5' 7" (1.702 m)   Wt 136.1 kg (300 lb)   SpO2 96%   BMI 46.99 kg/m²     Nursing note and vitals reviewed.  Constitutional: AAOx3. Well-developed and well-nourished. No distress.  HENT:   Eyes: EOMI. No discharge. Anicteric.  Mouth/Throat: Oropharynx is clear and moist.  Neck: Normal range of motion. Neck supple.  Cardiovascular: Normal rate and normal heart sounds.  " Exam reveals no gallop and no friction rub. No murmur heard.  Pulmonary/Chest: Mild respiratory distress. Expiratory wheezes, no rales. No tenderness.  Abdominal: Soft. No distension.   Musculoskeletal: Normal range of motion.   Neurological: Alert and oriented to person, place, and time.   Skin: Skin is warm and dry.   Psychiatric: Behavior is normal. Judgment normal.          Medical Decision Making / Independent Interpretations / External Records Reviewed:      ED Course as of 02/16/24 2305 Wed Feb 14, 2024   0802 --  EKG Interpretation: I independently reviewed and interpreted EKG which shows junctional rhythm at 74 beats per minute, no STEMI, no significant acute ST/T abnormalities.  No acute change compared to prior tracing.  --   [RC]   0833 I independently reviewed and interpreted CXR which shows no pneumothorax, no focal consolidation, no cardiomegaly, no acute process.   [RC]   1036 PMH of COPD/Asthma, HTN, T2D, CHF, MVR on AC, S/p RA-Hystrectomy/BSO/Lysis of Adhesions and Cystoscopy in 10/2023 who presented with complaints of dyspnea, wheezing, chest tightness x1 day. No LE edema. No fevers. No sick contacts.   On exam, patient has diffuse wheezes, mild tachypnea, mild respiratory distress.   The initial differential included acute COPD exacerbation, acute CHF, pneumonia, COVID-19 infection, flu, other acute viral infection.    Patient states that she feels at her baseline after breathing treatments given here.  She ambulated without difficulty, dyspnea, or desaturation.    She was given steroids.  --  I discussed with pt and/or guardian/caretaker that this evaluation in the ED does not suggest any emergent or life threatening medical condition requiring admission or further immediate intervention or diagnostics. Regardless, an unremarkable evaluation in the ED does not preclude the development or presence of a serious or life threatening condition. Pt was instructed to return for any worsening, new,  changed, or concerning symptoms.     I had a detailed discussion with patient and/or guardian/caretaker regarding findings, plan, return precautions, importance of medication adherence, need to follow-up with a PCP and specialist. All questions answered.     Note was created using voice recognition software. It may have occasional typographical errors not identified and edited despite initial review prior to signing.   [RC]      ED Course User Index  [RC] Irving Mcallister MD       --  I decided to obtain the patient's medical records. I reviewed patient's prior external notes / results: specialist documentation   .   --  Additional Medical Decision Making: Hospitalization or escalation of care considered    Medications   albuterol-ipratropium 2.5 mg-0.5 mg/3 mL nebulizer solution 3 mL (3 mLs Nebulization Given 2/14/24 0812)   benzonatate capsule 100 mg (100 mg Oral Given 2/14/24 0950)              No future appointments.     Diagnostic Impression:    1. COPD with acute exacerbation    2. Dyspnea                  Irving Mcallister MD  02/16/24 3114

## 2024-02-14 NOTE — ED NOTES
Pt rounding complete.  Pain 0/10.  Pt reports improvement after breathing treatments. Respirations even and unlabored. Aaox4. Restroom and comfort needs addressed.  Pt updated on plan of care.  Call light within reach.  Will continue to monitor.

## 2024-02-14 NOTE — ED NOTES
Pt ambulated down hallway with pulse ox. SpO2 remained above 94%. HR 90-96. Pt denies shortness of breath while ambulating.

## 2024-02-14 NOTE — ED TRIAGE NOTES
Pt reports to ED with shortness of breath, chest tightness, and cough for the past couple of days. Hx of asthma and COPD. Pt reports she has been using breathing tx and inhalers at home with no relief. Able to speak in full sentences. Respirations appear slightly labored. + blood thinners. Aaox4.

## 2024-02-14 NOTE — DISCHARGE INSTRUCTIONS

## 2024-12-26 ENCOUNTER — HOSPITAL ENCOUNTER (OUTPATIENT)
Facility: OTHER | Age: 54
Discharge: HOME | End: 2024-12-27
Attending: EMERGENCY MEDICINE | Admitting: EMERGENCY MEDICINE
Payer: MEDICAID

## 2024-12-26 DIAGNOSIS — R06.02 SHORTNESS OF BREATH: ICD-10-CM

## 2024-12-26 DIAGNOSIS — J44.1 COPD EXACERBATION: ICD-10-CM

## 2024-12-26 DIAGNOSIS — J10.1 INFLUENZA A: Primary | ICD-10-CM

## 2024-12-26 LAB
ALBUMIN SERPL BCP-MCNC: 3.6 G/DL (ref 3.5–5.2)
ALP SERPL-CCNC: 118 U/L (ref 40–150)
ALT SERPL W/O P-5'-P-CCNC: 15 U/L (ref 10–44)
ANION GAP SERPL CALC-SCNC: 9 MMOL/L (ref 8–16)
AST SERPL-CCNC: 17 U/L (ref 10–40)
BASOPHILS # BLD AUTO: 0.02 K/UL (ref 0–0.2)
BASOPHILS NFR BLD: 0.3 % (ref 0–1.9)
BILIRUB SERPL-MCNC: 0.5 MG/DL (ref 0.1–1)
BNP SERPL-MCNC: 59 PG/ML (ref 0–99)
BUN SERPL-MCNC: 11 MG/DL (ref 6–20)
CALCIUM SERPL-MCNC: 9.6 MG/DL (ref 8.7–10.5)
CHLORIDE SERPL-SCNC: 108 MMOL/L (ref 95–110)
CO2 SERPL-SCNC: 22 MMOL/L (ref 23–29)
CREAT SERPL-MCNC: 1.1 MG/DL (ref 0.5–1.4)
CTP QC/QA: YES
CTP QC/QA: YES
DIFFERENTIAL METHOD BLD: ABNORMAL
EOSINOPHIL # BLD AUTO: 0.1 K/UL (ref 0–0.5)
EOSINOPHIL NFR BLD: 1.7 % (ref 0–8)
ERYTHROCYTE [DISTWIDTH] IN BLOOD BY AUTOMATED COUNT: 15.9 % (ref 11.5–14.5)
EST. GFR  (NO RACE VARIABLE): >60 ML/MIN/1.73 M^2
GLUCOSE SERPL-MCNC: 92 MG/DL (ref 70–110)
HCT VFR BLD AUTO: 41.3 % (ref 37–48.5)
HGB BLD-MCNC: 12.6 G/DL (ref 12–16)
IMM GRANULOCYTES # BLD AUTO: 0.02 K/UL (ref 0–0.04)
IMM GRANULOCYTES NFR BLD AUTO: 0.3 % (ref 0–0.5)
LYMPHOCYTES # BLD AUTO: 0.8 K/UL (ref 1–4.8)
LYMPHOCYTES NFR BLD: 11.8 % (ref 18–48)
MCH RBC QN AUTO: 28.6 PG (ref 27–31)
MCHC RBC AUTO-ENTMCNC: 30.5 G/DL (ref 32–36)
MCV RBC AUTO: 94 FL (ref 82–98)
MONOCYTES # BLD AUTO: 0.3 K/UL (ref 0.3–1)
MONOCYTES NFR BLD: 4.8 % (ref 4–15)
NEUTROPHILS # BLD AUTO: 5.2 K/UL (ref 1.8–7.7)
NEUTROPHILS NFR BLD: 81.1 % (ref 38–73)
NRBC BLD-RTO: 0 /100 WBC
PLATELET # BLD AUTO: 193 K/UL (ref 150–450)
PMV BLD AUTO: 10.6 FL (ref 9.2–12.9)
POC MOLECULAR INFLUENZA A AGN: POSITIVE
POC MOLECULAR INFLUENZA B AGN: NEGATIVE
POTASSIUM SERPL-SCNC: 4.2 MMOL/L (ref 3.5–5.1)
PROT SERPL-MCNC: 7.8 G/DL (ref 6–8.4)
RBC # BLD AUTO: 4.4 M/UL (ref 4–5.4)
SARS-COV-2 RDRP RESP QL NAA+PROBE: NEGATIVE
SODIUM SERPL-SCNC: 139 MMOL/L (ref 136–145)
TROPONIN I SERPL DL<=0.01 NG/ML-MCNC: 0.01 NG/ML (ref 0–0.03)
WBC # BLD AUTO: 6.46 K/UL (ref 3.9–12.7)

## 2024-12-26 PROCEDURE — G0378 HOSPITAL OBSERVATION PER HR: HCPCS

## 2024-12-26 PROCEDURE — 94761 N-INVAS EAR/PLS OXIMETRY MLT: CPT

## 2024-12-26 PROCEDURE — 63600175 PHARM REV CODE 636 W HCPCS: Performed by: EMERGENCY MEDICINE

## 2024-12-26 PROCEDURE — 25000242 PHARM REV CODE 250 ALT 637 W/ HCPCS: Performed by: EMERGENCY MEDICINE

## 2024-12-26 PROCEDURE — 93005 ELECTROCARDIOGRAM TRACING: CPT

## 2024-12-26 PROCEDURE — 85025 COMPLETE CBC W/AUTO DIFF WBC: CPT | Performed by: NURSE PRACTITIONER

## 2024-12-26 PROCEDURE — 99291 CRITICAL CARE FIRST HOUR: CPT

## 2024-12-26 PROCEDURE — 87635 SARS-COV-2 COVID-19 AMP PRB: CPT | Performed by: NURSE PRACTITIONER

## 2024-12-26 PROCEDURE — 96365 THER/PROPH/DIAG IV INF INIT: CPT

## 2024-12-26 PROCEDURE — 93010 ELECTROCARDIOGRAM REPORT: CPT | Mod: ,,, | Performed by: INTERNAL MEDICINE

## 2024-12-26 PROCEDURE — 96366 THER/PROPH/DIAG IV INF ADDON: CPT

## 2024-12-26 PROCEDURE — 80053 COMPREHEN METABOLIC PANEL: CPT | Performed by: NURSE PRACTITIONER

## 2024-12-26 PROCEDURE — 63600175 PHARM REV CODE 636 W HCPCS: Performed by: NURSE PRACTITIONER

## 2024-12-26 PROCEDURE — 94640 AIRWAY INHALATION TREATMENT: CPT

## 2024-12-26 PROCEDURE — 84484 ASSAY OF TROPONIN QUANT: CPT | Performed by: NURSE PRACTITIONER

## 2024-12-26 PROCEDURE — 83880 ASSAY OF NATRIURETIC PEPTIDE: CPT | Performed by: NURSE PRACTITIONER

## 2024-12-26 RX ORDER — MAGNESIUM SULFATE HEPTAHYDRATE 40 MG/ML
2 INJECTION, SOLUTION INTRAVENOUS
Status: COMPLETED | OUTPATIENT
Start: 2024-12-26 | End: 2024-12-27

## 2024-12-26 RX ORDER — IPRATROPIUM BROMIDE AND ALBUTEROL SULFATE 2.5; .5 MG/3ML; MG/3ML
3 SOLUTION RESPIRATORY (INHALATION) EVERY 4 HOURS
Status: DISCONTINUED | OUTPATIENT
Start: 2024-12-27 | End: 2024-12-27 | Stop reason: HOSPADM

## 2024-12-26 RX ORDER — PREDNISONE 20 MG/1
60 TABLET ORAL
Status: COMPLETED | OUTPATIENT
Start: 2024-12-26 | End: 2024-12-26

## 2024-12-26 RX ORDER — IPRATROPIUM BROMIDE AND ALBUTEROL SULFATE 2.5; .5 MG/3ML; MG/3ML
3 SOLUTION RESPIRATORY (INHALATION) EVERY 4 HOURS
Status: DISCONTINUED | OUTPATIENT
Start: 2024-12-26 | End: 2024-12-26

## 2024-12-26 RX ORDER — IPRATROPIUM BROMIDE AND ALBUTEROL SULFATE 2.5; .5 MG/3ML; MG/3ML
3 SOLUTION RESPIRATORY (INHALATION)
Status: COMPLETED | OUTPATIENT
Start: 2024-12-26 | End: 2024-12-26

## 2024-12-26 RX ADMIN — MAGNESIUM SULFATE HEPTAHYDRATE 2 G: 40 INJECTION, SOLUTION INTRAVENOUS at 09:12

## 2024-12-26 RX ADMIN — IPRATROPIUM BROMIDE AND ALBUTEROL SULFATE 3 ML: 2.5; .5 SOLUTION RESPIRATORY (INHALATION) at 05:12

## 2024-12-26 RX ADMIN — IPRATROPIUM BROMIDE AND ALBUTEROL SULFATE 3 ML: 2.5; .5 SOLUTION RESPIRATORY (INHALATION) at 08:12

## 2024-12-26 RX ADMIN — PREDNISONE 60 MG: 20 TABLET ORAL at 05:12

## 2024-12-26 NOTE — Clinical Note
Diagnosis: COPD exacerbation [539905]   Future Attending Provider: CORRIE RAIN [[id number]]   Is the patient being sent to ED Observation?: Yes

## 2024-12-26 NOTE — ED PROVIDER NOTES
Chief complaint:  Shortness of Breath (Pt reports onset of constant SOB worse on exertion x 1 day with productive cough and headache. Pt reports hx of COPD, no relief from breathing tx at home)      Source of information:  Patient, old chart    HPI:  Delaney Scott is a 54 y.o. female presenting with shortness of breath which began yesterday, worsened today.  No relief with her inhaler and nebulizer.  Last use this around noon.  Chills but no fevers.  Cough productive of clear phlegm.  No chest pain.  Compliant with her medications.  No nausea vomiting or diarrhea.  No other acute complaints    ROS: As per HPI    Review of patient's allergies indicates:  No Known Allergies    No current facility-administered medications on file prior to encounter.     Current Outpatient Medications on File Prior to Encounter   Medication Sig Dispense Refill    albuterol (PROVENTIL/VENTOLIN HFA) 90 mcg/actuation inhaler Inhale 1-2 puffs into the lungs every 6 (six) hours as needed for Wheezing or Shortness of Breath (Please dispense with spacer). 6.7 g 0    atorvastatin (LIPITOR) 40 MG tablet Take 1 tablet (40 mg total) by mouth once daily. 30 tablet 0    cetirizine (ZYRTEC) 10 MG tablet Take 1 tablet (10 mg total) by mouth once daily. 30 tablet 0    ENTRESTO 24-26 mg per tablet Take 1 tablet by mouth 2 (two) times daily. 60 tablet 0    fluticasone furoate-vilanteroL (BREO) 200-25 mcg/dose DsDv diskus inhaler Inhale 1 puff into the lungs once daily. Controller 60 each 0    furosemide (LASIX) 40 MG tablet Take 1 tablet (40 mg total) by mouth once daily. 30 tablet 0    JARDIANCE 10 mg tablet Take 1 tablet (10 mg total) by mouth once daily. 30 tablet 0    montelukast (SINGULAIR) 10 mg tablet Take 1 tablet (10 mg total) by mouth every evening. 30 tablet 0    OZEMPIC 0.25 mg or 0.5 mg (2 mg/3 mL) pen injector Inject 0.5 mg into the skin every 7 days.      warfarin (COUMADIN) 2.5 MG tablet Take 1 tablet (2.5 mg total) by mouth every  Tuesday, Thursday, Saturday, . 16 tablet 0    warfarin (COUMADIN) 5 MG tablet Take 1 tablet (5 mg total) by mouth every Mon, Wed, Fri. 12 tablet 11       PMH:  As per HPI and below:  Past Medical History:   Diagnosis Date    Anticoagulant long-term use     Arthritis     Asthma     CHF (congestive heart failure), NYHA class III 2014    COPD with acute exacerbation     Diabetes mellitus     Hypertension      Past Surgical History:   Procedure Laterality Date    BILATERAL TUBAL LIGATION      laparascopic    CARDIAC SURGERY       SECTION      x2    MITRAL VALVE REPLACEMENT      [employer] mechanical valve    TUBAL LIGATION           Physical Exam:    Vitals:    24 0016   BP: (!) 114/55   Pulse: 106   Resp: 18   Temp: 98.4 °F (36.9 °C)       General:  Uncomfortable appearing but in no acute distress. Well developed. Well nourished.  Eyes: PERRL. EOM intact. no photophobia, no nystagmus  Conjunctivae - no pallor or icterus.   ENT: HEAD: Normal - atraumatic. Normal external ears. Normal nose.  No facial asymmetry. Mucous membranes - moist.  Neck: Neck supple. no meningismus. No cervical lymphadenopathy.  No JVD.  Cardiovascular: Regular rate and rhythm. Normal S1 and S2. No murmur. No gallop. No rub.  2+ peripheral pulses  Respiratory:  Tachypneic, but speaks in full sentences and no accessory muscle use.  She has diffuse expiratory wheeze and moderately diminished air exchange throughout.  No basilar rales.      GI: Soft. Nontender. Nondistended. No guarding. No rebound. Normal BS.  Musculoskeletal:  Normal weight-bearing and gait No deformities. Normal ROM x4.   Integument: No acute skin rashes. No clubbing or cyanosis  Neurologic: No gross neurological deficits.   Psychiatric: Awake, alert.  Oriented x3.  Normal speech and mentation.        Labs Reviewed   CBC W/ AUTO DIFFERENTIAL - Abnormal       Result Value    WBC 6.46      RBC 4.40      Hemoglobin 12.6      Hematocrit 41.3      MCV 94       MCH 28.6      MCHC 30.5 (*)     RDW 15.9 (*)     Platelets 193      MPV 10.6      Immature Granulocytes 0.3      Gran # (ANC) 5.2      Immature Grans (Abs) 0.02      Lymph # 0.8 (*)     Mono # 0.3      Eos # 0.1      Baso # 0.02      nRBC 0      Gran % 81.1 (*)     Lymph % 11.8 (*)     Mono % 4.8      Eosinophil % 1.7      Basophil % 0.3      Differential Method Automated     COMPREHENSIVE METABOLIC PANEL - Abnormal    Sodium 139      Potassium 4.2      Chloride 108      CO2 22 (*)     Glucose 92      BUN 11      Creatinine 1.1      Calcium 9.6      Total Protein 7.8      Albumin 3.6      Total Bilirubin 0.5      Alkaline Phosphatase 118      AST 17      ALT 15      eGFR >60      Anion Gap 9     POCT INFLUENZA A/B MOLECULAR - Abnormal    POC Molecular Influenza A Ag Positive (*)     POC Molecular Influenza B Ag Negative       Acceptable Yes     TROPONIN I    Troponin I 0.008     B-TYPE NATRIURETIC PEPTIDE    BNP 59     SARS-COV-2 RDRP GENE    POC Rapid COVID Negative       Acceptable Yes         Medications   predniSONE tablet 60 mg (has no administration in time range)   albuterol-ipratropium 2.5 mg-0.5 mg/3 mL nebulizer solution 3 mL (has no administration in time range)   benzonatate capsule 100 mg (has no administration in time range)   albuterol-ipratropium 2.5 mg-0.5 mg/3 mL nebulizer solution 3 mL (3 mLs Nebulization Given 12/26/24 1743)   predniSONE tablet 60 mg (60 mg Oral Given 12/26/24 1736)   albuterol-ipratropium 2.5 mg-0.5 mg/3 mL nebulizer solution 3 mL (3 mLs Nebulization Given 12/26/24 2057)   magnesium sulfate 2g in water 50mL IVPB (premix) (0 g Intravenous Stopped 12/27/24 0023)   Medical Decision Making  Differential diagnosis includes CHF exacerbation, arrhythmia, COPD exacerbation, asthma exacerbation, pneumonia    Amount and/or Complexity of Data Reviewed  External Data Reviewed: labs, radiology, ECG and notes.  Labs: ordered. Decision-making details documented  in ED Course.  Radiology: ordered and independent interpretation performed. Decision-making details documented in ED Course.  ECG/medicine tests: ordered and independent interpretation performed. Decision-making details documented in ED Course.    Risk  Prescription drug management.  Decision regarding hospitalization.      Critical Care    Date/Time: 12/27/2024 12:47 AM    Performed by: Dwaine Keita II, MD  Authorized by: Julian Loza,   Direct patient critical care time: 25 minutes  Additional history critical care time: 4 minutes  Ordering / reviewing critical care time: 8 minutes  Documentation critical care time: 10 minutes  Consulting other physicians critical care time: 5 minutes  Total critical care time (exclusive of procedural time) : 52 minutes  Critical care was necessary to treat or prevent imminent or life-threatening deterioration of the following conditions: respiratory failure.            Independently interpreted x-ray and/or EKG:  Twelve lead EKG shows sinus tach, 110.  No ST or T-wave changes.  No ischemia arrhythmia or pericarditis.    Chest x-ray shows flattening of the diaphragm but no no focal infiltrate or effusion.  No pulmonary edema.    MDM:    54 y.o. female with history of asthma and COPD presents with shortness of breath unrelieved by her home inhalers.  Afebrile.  Marginal O2 saturations.  Diminished air exchange and diffuse wheezing on exam.  Chest x-ray does not show evidence of pulmonary edema or focal infiltrate and BNP is negative suggesting against CHF component.  Given multiple neb treatments still with diminished air exchange and expiratory wheeze.  Observed further and neb treatments were repeated.  Afterwards the patient is feeling better but not back to baseline on exam still has expiratory wheeze and borderline O2 sats on room air.  Therefore will admit to the observation unit for further steroids, neb treatments and observation.  Care is turned over at  2:00 a.m..    Medications   predniSONE tablet 60 mg (has no administration in time range)   albuterol-ipratropium 2.5 mg-0.5 mg/3 mL nebulizer solution 3 mL (has no administration in time range)   benzonatate capsule 100 mg (has no administration in time range)   albuterol-ipratropium 2.5 mg-0.5 mg/3 mL nebulizer solution 3 mL (3 mLs Nebulization Given 12/26/24 1743)   predniSONE tablet 60 mg (60 mg Oral Given 12/26/24 1736)   albuterol-ipratropium 2.5 mg-0.5 mg/3 mL nebulizer solution 3 mL (3 mLs Nebulization Given 12/26/24 2057)   magnesium sulfate 2g in water 50mL IVPB (premix) (0 g Intravenous Stopped 12/27/24 0023)       ASSESSMENT:   1. COPD exacerbation    2. Shortness of breath             Dwaine Keita II, MD  12/27/24 6725

## 2024-12-26 NOTE — Clinical Note
"Delaney Newelle" Sonia was seen and treated in our emergency department on 12/26/2024.  She may return to work on 12/31/2024.       If you have any questions or concerns, please don't hesitate to call.      Maeve Ascencio PA"

## 2024-12-27 VITALS
SYSTOLIC BLOOD PRESSURE: 112 MMHG | BODY MASS INDEX: 44.25 KG/M2 | DIASTOLIC BLOOD PRESSURE: 63 MMHG | OXYGEN SATURATION: 95 % | HEART RATE: 97 BPM | RESPIRATION RATE: 18 BRPM | HEIGHT: 68 IN | TEMPERATURE: 98 F | WEIGHT: 292 LBS

## 2024-12-27 PROBLEM — J10.1 INFLUENZA A: Status: ACTIVE | Noted: 2024-12-27

## 2024-12-27 LAB
OHS QRS DURATION: 76 MS
OHS QTC CALCULATION: 458 MS

## 2024-12-27 PROCEDURE — 99900035 HC TECH TIME PER 15 MIN (STAT)

## 2024-12-27 PROCEDURE — G0378 HOSPITAL OBSERVATION PER HR: HCPCS

## 2024-12-27 PROCEDURE — 94761 N-INVAS EAR/PLS OXIMETRY MLT: CPT

## 2024-12-27 PROCEDURE — 63600175 PHARM REV CODE 636 W HCPCS: Performed by: NURSE PRACTITIONER

## 2024-12-27 PROCEDURE — 25000003 PHARM REV CODE 250: Performed by: EMERGENCY MEDICINE

## 2024-12-27 PROCEDURE — 94799 UNLISTED PULMONARY SVC/PX: CPT

## 2024-12-27 PROCEDURE — 25000242 PHARM REV CODE 250 ALT 637 W/ HCPCS: Performed by: EMERGENCY MEDICINE

## 2024-12-27 PROCEDURE — 94640 AIRWAY INHALATION TREATMENT: CPT | Mod: XB

## 2024-12-27 RX ORDER — BENZONATATE 100 MG/1
100 CAPSULE ORAL 3 TIMES DAILY PRN
Status: DISCONTINUED | OUTPATIENT
Start: 2024-12-27 | End: 2024-12-27 | Stop reason: HOSPADM

## 2024-12-27 RX ORDER — PROMETHAZINE HYDROCHLORIDE AND CODEINE PHOSPHATE 6.25; 1 MG/5ML; MG/5ML
5 SOLUTION ORAL EVERY 4 HOURS PRN
Qty: 118 ML | Refills: 0 | Status: SHIPPED | OUTPATIENT
Start: 2024-12-27 | End: 2024-12-31

## 2024-12-27 RX ORDER — BENZONATATE 200 MG/1
200 CAPSULE ORAL 3 TIMES DAILY PRN
Qty: 30 CAPSULE | Refills: 0 | Status: SHIPPED | OUTPATIENT
Start: 2024-12-27 | End: 2025-01-06

## 2024-12-27 RX ORDER — OSELTAMIVIR PHOSPHATE 75 MG/1
75 CAPSULE ORAL 2 TIMES DAILY
Qty: 10 CAPSULE | Refills: 0 | Status: SHIPPED | OUTPATIENT
Start: 2024-12-27 | End: 2025-01-01

## 2024-12-27 RX ORDER — ONDANSETRON 4 MG/1
4 TABLET, ORALLY DISINTEGRATING ORAL EVERY 8 HOURS PRN
Qty: 30 TABLET | Refills: 0 | Status: SHIPPED | OUTPATIENT
Start: 2024-12-27

## 2024-12-27 RX ORDER — PREDNISONE 10 MG/1
10 TABLET ORAL DAILY
Qty: 21 TABLET | Refills: 0 | Status: SHIPPED | OUTPATIENT
Start: 2024-12-27

## 2024-12-27 RX ADMIN — BENZONATATE 100 MG: 100 CAPSULE ORAL at 01:12

## 2024-12-27 RX ADMIN — IPRATROPIUM BROMIDE AND ALBUTEROL SULFATE 3 ML: 2.5; .5 SOLUTION RESPIRATORY (INHALATION) at 11:12

## 2024-12-27 RX ADMIN — IPRATROPIUM BROMIDE AND ALBUTEROL SULFATE 3 ML: 2.5; .5 SOLUTION RESPIRATORY (INHALATION) at 12:12

## 2024-12-27 RX ADMIN — IPRATROPIUM BROMIDE AND ALBUTEROL SULFATE 3 ML: 2.5; .5 SOLUTION RESPIRATORY (INHALATION) at 07:12

## 2024-12-27 RX ADMIN — IPRATROPIUM BROMIDE AND ALBUTEROL SULFATE 3 ML: 2.5; .5 SOLUTION RESPIRATORY (INHALATION) at 04:12

## 2024-12-27 RX ADMIN — BENZONATATE 100 MG: 100 CAPSULE ORAL at 09:12

## 2024-12-27 RX ADMIN — PREDNISONE 60 MG: 50 TABLET ORAL at 09:12

## 2024-12-27 NOTE — PROGRESS NOTES
ED Observation Unit  Progress Note      HPI   Delaney Scott is a 54 y.o. female presenting with shortness of breath which began yesterday, worsened today.  No relief with her inhaler and nebulizer.  Last use this around noon.  Chills but no fevers.  Cough productive of clear phlegm.  No chest pain.  Compliant with her medications.  No nausea vomiting or diarrhea.  No other acute complaints       ED Course:  Patient was treated with 3 DuoNebs and steroids and had a minimal improvement in her lung sounds.  Plan for admission to the EDOU for q.4 hours DuoNebs, steroids and re-evaluation in the morning.  Will also check COVID and flu.     I reviewed the ED Provider Note dated 2024  prior to my evaluation of this patient.  I reviewed all labs and imaging performed in the Main ED, prior to patient being placed in Observation. Patient was placed in the ED Observation Unit for COPD exacerbation    Interval History   Marked improvement symptoms.  Noted to be flu positive.  Likely they exacerbation of her symptoms.  Given comorbidities will initiate Tamiflu and sent home with steroid taper.  No significant hypoxia with ambulation    PMHx   Past Medical History:   Diagnosis Date    Anticoagulant long-term use     Arthritis     Asthma     CHF (congestive heart failure), NYHA class III 2014    COPD with acute exacerbation     Diabetes mellitus     Hypertension       Past Surgical History:   Procedure Laterality Date    BILATERAL TUBAL LIGATION      laparascopic    CARDIAC SURGERY       SECTION      x2    MITRAL VALVE REPLACEMENT      [employer] mechanical valve    TUBAL LIGATION          Family Hx   Family History   Problem Relation Name Age of Onset    Hypertension Mother      Cancer Mother          unknown type    Diabetes type II Mother      Hypertension Father          Social Hx   Social History     Socioeconomic History    Marital status: Single   Tobacco Use    Smoking status: Some Days      Current packs/day: 0.50     Types: Cigarettes    Smokeless tobacco: Never   Substance and Sexual Activity    Alcohol use: No    Drug use: No     Social Drivers of Health     Financial Resource Strain: Low Risk  (8/24/2023)    Overall Financial Resource Strain (CARDIA)     Difficulty of Paying Living Expenses: Not hard at all   Food Insecurity: No Food Insecurity (8/24/2023)    Hunger Vital Sign     Worried About Running Out of Food in the Last Year: Never true     Ran Out of Food in the Last Year: Never true   Transportation Needs: No Transportation Needs (8/24/2023)    PRAPARE - Transportation     Lack of Transportation (Medical): No     Lack of Transportation (Non-Medical): No   Physical Activity: Inactive (8/24/2023)    Exercise Vital Sign     Days of Exercise per Week: 0 days     Minutes of Exercise per Session: 0 min   Stress: No Stress Concern Present (8/24/2023)    [employer] - Occupational Stress Questionnaire     Feeling of Stress : Not at all   Housing Stability: Unknown (8/24/2023)    Housing Stability Vital Sign     Unable to Pay for Housing in the Last Year: No     Unstable Housing in the Last Year: No        Vital Signs   Vitals:    12/27/24 0430 12/27/24 0739 12/27/24 0833 12/27/24 1147   BP:   (!) 123/58    BP Location:   Left arm    Patient Position:   Lying    Pulse: 100 98 96 96   Resp: 18 20 12 16   Temp:   97.9 °F (36.6 °C)    TempSrc:   Oral    SpO2: 95% 95% (!) 94%    Weight:       Height:            Review of Systems  As per HPI    Brief Physical Exam/Reassessment   Nursing note and vitals reviewed.  Constitutional: Vital signs are normal. She appears well-developed and well-nourished. She does not appear ill. No distress.   Obese   Neck: Neck supple.   Cardiovascular:  Normal rate, regular rhythm, S1 normal, S2 normal and normal heart sounds.           Pulmonary/Chest: Effort normal.  Faint expiratory wheeze.  Speaking in full sentences with no difficulty.   Persistent dry cough during interview  Abdominal: Abdomen is soft and flat. There is no abdominal tenderness.   Musculoskeletal:      Cervical back: Neck supple.      Neurological: She is alert and oriented to person, place, and time. GCS eye subscore is 4. GCS verbal subscore is 5. GCS motor subscore is 6.   Skin: Skin is warm, dry and intact.   Psychiatric: She has a normal mood and affect. Her behavior is normal.     Labs/Imaging   Labs Reviewed   CBC W/ AUTO DIFFERENTIAL - Abnormal       Result Value    WBC 6.46      RBC 4.40      Hemoglobin 12.6      Hematocrit 41.3      MCV 94      MCH 28.6      MCHC 30.5 (*)     RDW 15.9 (*)     Platelets 193      MPV 10.6      Immature Granulocytes 0.3      Gran # (ANC) 5.2      Immature Grans (Abs) 0.02      Lymph # 0.8 (*)     Mono # 0.3      Eos # 0.1      Baso # 0.02      nRBC 0      Gran % 81.1 (*)     Lymph % 11.8 (*)     Mono % 4.8      Eosinophil % 1.7      Basophil % 0.3      Differential Method Automated     COMPREHENSIVE METABOLIC PANEL - Abnormal    Sodium 139      Potassium 4.2      Chloride 108      CO2 22 (*)     Glucose 92      BUN 11      Creatinine 1.1      Calcium 9.6      Total Protein 7.8      Albumin 3.6      Total Bilirubin 0.5      Alkaline Phosphatase 118      AST 17      ALT 15      eGFR >60      Anion Gap 9     POCT INFLUENZA A/B MOLECULAR - Abnormal    POC Molecular Influenza A Ag Positive (*)     POC Molecular Influenza B Ag Negative       Acceptable Yes     TROPONIN I    Troponin I 0.008     B-TYPE NATRIURETIC PEPTIDE    BNP 59     BASIC METABOLIC PANEL   CBC W/ AUTO DIFFERENTIAL   SARS-COV-2 RDRP GENE    POC Rapid COVID Negative       Acceptable Yes        Imaging Results              X-Ray Chest 1 View (Final result)  Result time 12/26/24 16:33:26   Procedure changed from X-Ray Chest AP Portable     Final result by Elena Cee MD (12/26/24 16:33:26)                   Impression:      No acute  cardiopulmonary process seen.      Electronically signed by: Elena Cee  Date:    12/26/2024  Time:    16:33               Narrative:    EXAMINATION:  XR CHEST 1 VIEW    CLINICAL HISTORY:  CHF;    TECHNIQUE:  Single frontal view of the chest was performed.    COMPARISON:  02/14/2024 and 10/27/2023    FINDINGS:  Lungs are well expanded.  No acute consolidation, pleural effusion, or pneumothorax.    Cardiac silhouette is normal in size.  Stable prominent perihilar pulmonary vasculature.  Previous median sternotomy.                                       I reviewed all labs, imaging, EKGs.     Plan   1. COPD exacerbation    2. Shortness of breath      Likely secondary to influenza virus.  Will initiate Tamiflu and sent home with steroid taper    I have discussed this case with dr. Vu.

## 2024-12-27 NOTE — ED NOTES
Patient care resumed, resting comfortably, visible chest rise and fall, respirations even and unlabored, lighting adjusted, safety measures in place, will continue to monitor.

## 2024-12-27 NOTE — ED NOTES
Delaney Scott, a 54 y.o. female presents to the ED w/ complaint of SOB with increased productive cough x1 day with hx of COPD and asthma. Patient states she has been using her inhaler and breathing tx at home without relief. She notes chest tightness and POWELL.     Triage note:  Chief Complaint   Patient presents with    Shortness of Breath     Pt reports onset of constant SOB worse on exertion x 1 day with productive cough and headache. Pt reports hx of COPD, no relief from breathing tx at home     Review of patient's allergies indicates:  No Known Allergies  Past Medical History:   Diagnosis Date    Anticoagulant long-term use     Arthritis     Asthma     CHF (congestive heart failure), NYHA class III 12/19/2014    COPD with acute exacerbation     Diabetes mellitus     Hypertension      '

## 2024-12-27 NOTE — H&P
[facility] ED Observation Unit  History and Physical      I assumed care of this patient from the Emergency Department at onset of observation time, 10:07 PM  on 2024.       History of Present Illness:  Delaney Scott is a 54 y.o. female presenting with shortness of breath which began yesterday, worsened today.  No relief with her inhaler and nebulizer.  Last use this around noon.  Chills but no fevers.  Cough productive of clear phlegm.  No chest pain.  Compliant with her medications.  No nausea vomiting or diarrhea.  No other acute complaints      ED Course:  Patient was treated with 3 DuoNebs and steroids and had a minimal improvement in her lung sounds.  Plan for admission to the EDOU for q.4 hours DuoNebs, steroids and re-evaluation in the morning.  Will also check COVID and flu.    I reviewed the ED Provider Note dated 2024  prior to my evaluation of this patient.  I reviewed all labs and imaging performed in the Main ED, prior to patient being placed in Observation. Patient was placed in the ED Observation Unit for COPD exacerbation    PMHx   Past Medical History:   Diagnosis Date    Anticoagulant long-term use     Arthritis     Asthma     CHF (congestive heart failure), NYHA class III 2014    COPD with acute exacerbation     Diabetes mellitus     Hypertension       Past Surgical History:   Procedure Laterality Date    BILATERAL TUBAL LIGATION      laparascopic    CARDIAC SURGERY       SECTION      x2    MITRAL VALVE REPLACEMENT      [place] mechanical valve    TUBAL LIGATION          Family Hx   Family History   Problem Relation Name Age of Onset    Hypertension Mother      Cancer Mother          unknown type    Diabetes type II Mother      Hypertension Father          Social Hx   Social History     Socioeconomic History    Marital status: Single   Tobacco Use    Smoking status: Some Days     Current packs/day: 0.50     Types: Cigarettes    Smokeless tobacco: Never    Substance and Sexual Activity    Alcohol use: No    Drug use: No     Social Drivers of Health     Financial Resource Strain: Low Risk  (8/24/2023)    Overall Financial Resource Strain (CARDIA)     Difficulty of Paying Living Expenses: Not hard at all   Food Insecurity: No Food Insecurity (8/24/2023)    Hunger Vital Sign     Worried About Running Out of Food in the Last Year: Never true     Ran Out of Food in the Last Year: Never true   Transportation Needs: No Transportation Needs (8/24/2023)    PRAPARE - Transportation     Lack of Transportation (Medical): No     Lack of Transportation (Non-Medical): No   Physical Activity: Inactive (8/24/2023)    Exercise Vital Sign     Days of Exercise per Week: 0 days     Minutes of Exercise per Session: 0 min   Stress: No Stress Concern Present (8/24/2023)    [employer] - Occupational Stress Questionnaire     Feeling of Stress : Not at all   Housing Stability: Unknown (8/24/2023)    Housing Stability Vital Sign     Unable to Pay for Housing in the Last Year: No     Unstable Housing in the Last Year: No        Vital Signs   Vitals:    12/26/24 2044 12/26/24 2045 12/26/24 2051 12/26/24 2056   BP: (!) 163/74 139/65     Pulse: 99 100 100 104   Resp: 19 20 18 18   Temp: 97.9 °F (36.6 °C) 97.9 °F (36.6 °C)     TempSrc: Oral Oral     SpO2: (!) 92% (!) 93% 95% 95%   Weight:       Height:        12/26/24 2057   BP:    Pulse: 104   Resp: 18   Temp:    TempSrc:    SpO2: 96%   Weight:    Height:        Review of Systems  Review of Systems   Constitutional:  Negative for chills and fever.   Respiratory:  Positive for cough, shortness of breath and wheezing.    Cardiovascular:  Negative for chest pain.   Neurological:  Negative for weakness and headaches.   All other systems reviewed and are negative.      Brief Physical Exam/Reassessment   Physical Exam    Nursing note and vitals reviewed.  Constitutional: Vital signs are normal. She appears well-developed  and well-nourished. She does not appear ill. No distress.   Obese   Neck: Neck supple.   Cardiovascular:  Normal rate, regular rhythm, S1 normal, S2 normal and normal heart sounds.           Pulmonary/Chest: Effort normal. Tachypnea noted. She has decreased breath sounds. She has wheezes.   Abdominal: Abdomen is soft and flat. There is no abdominal tenderness.   Musculoskeletal:      Cervical back: Neck supple.     Neurological: She is alert and oriented to person, place, and time. GCS eye subscore is 4. GCS verbal subscore is 5. GCS motor subscore is 6.   Skin: Skin is warm, dry and intact.   Psychiatric: She has a normal mood and affect. Her behavior is normal.         Abnormal Labs Reviewed   CBC W/ AUTO DIFFERENTIAL - Abnormal; Notable for the following components:       Result Value    MCHC 30.5 (*)     RDW 15.9 (*)     Lymph # 0.8 (*)     Gran % 81.1 (*)     Lymph % 11.8 (*)     All other components within normal limits   COMPREHENSIVE METABOLIC PANEL - Abnormal; Notable for the following components:    CO2 22 (*)     All other components within normal limits       X-Ray Chest 1 View   Final Result      No acute cardiopulmonary process seen.         Electronically signed by: Elena Cee   Date:    12/26/2024   Time:    16:33            Medications:   Scheduled Meds:   albuterol-ipratropium  3 mL Nebulization Q4H    magnesium sulfate IVPB  2 g Intravenous ED 1 Time    [START ON 12/27/2024] predniSONE  60 mg Oral Daily     Continuous Infusions:  PRN Meds:.      Assessment/Plan:    1. COPD exacerbation     - q.4 hours DuoNebs, steroids   2. Shortness of breath        Case was discussed with the ED provider, Garrett Keita MD

## 2024-12-27 NOTE — ED NOTES
Pt AAOx4, GCS 15, NAD noted. Pt feeling better and wants to go home. Pt given timeframe for provider rounding to be assessed for discharge. Pt denies any pain. No further complaints at this time. Bed lowered and locked, call light within reach. Care ongoing.